# Patient Record
Sex: FEMALE | Race: WHITE | Employment: UNEMPLOYED | URBAN - METROPOLITAN AREA
[De-identification: names, ages, dates, MRNs, and addresses within clinical notes are randomized per-mention and may not be internally consistent; named-entity substitution may affect disease eponyms.]

---

## 2017-05-29 ENCOUNTER — HOSPITAL ENCOUNTER (EMERGENCY)
Age: 62
Discharge: HOME | End: 2017-05-29
Attending: EMERGENCY MEDICINE
Payer: MEDICARE

## 2017-05-29 VITALS
BODY MASS INDEX: 18.33 KG/M2 | TEMPERATURE: 98 F | RESPIRATION RATE: 20 BRPM | WEIGHT: 110 LBS | SYSTOLIC BLOOD PRESSURE: 127 MMHG | DIASTOLIC BLOOD PRESSURE: 74 MMHG | OXYGEN SATURATION: 100 % | HEIGHT: 65 IN | HEART RATE: 100 BPM

## 2017-05-29 DIAGNOSIS — F10.930 ALCOHOL WITHDRAWAL, UNCOMPLICATED (HCC): Primary | ICD-10-CM

## 2017-05-29 LAB
ALBUMIN SERPL BCP-MCNC: 3.6 G/DL (ref 3.2–4.6)
ALBUMIN/GLOB SERPL: 1.1 {RATIO} (ref 1.2–3.5)
ALP SERPL-CCNC: 107 U/L (ref 50–136)
ALT SERPL-CCNC: 16 U/L (ref 12–65)
AMPHET UR QL SCN: NEGATIVE
ANION GAP BLD CALC-SCNC: 15 MMOL/L (ref 7–16)
APAP SERPL-MCNC: <2 UG/ML (ref 10–30)
APTT PPP: 28.3 SEC (ref 23.5–31.7)
AST SERPL W P-5'-P-CCNC: 28 U/L (ref 15–37)
ATRIAL RATE: 108 BPM
BARBITURATES UR QL SCN: NEGATIVE
BASOPHILS # BLD AUTO: 0 K/UL (ref 0–0.2)
BASOPHILS # BLD: 0 % (ref 0–2)
BENZODIAZ UR QL: NEGATIVE
BILIRUB SERPL-MCNC: 0.8 MG/DL (ref 0.2–1.1)
BUN SERPL-MCNC: 7 MG/DL (ref 8–23)
CALCIUM SERPL-MCNC: 8.8 MG/DL (ref 8.3–10.4)
CALCULATED P AXIS, ECG09: 81 DEGREES
CALCULATED R AXIS, ECG10: 82 DEGREES
CALCULATED T AXIS, ECG11: 74 DEGREES
CANNABINOIDS UR QL SCN: NEGATIVE
CHLORIDE SERPL-SCNC: 98 MMOL/L (ref 98–107)
CO2 SERPL-SCNC: 23 MMOL/L (ref 21–32)
COCAINE UR QL SCN: NEGATIVE
CREAT SERPL-MCNC: 1.05 MG/DL (ref 0.6–1)
DIAGNOSIS, 93000: NORMAL
DIFFERENTIAL METHOD BLD: ABNORMAL
EOSINOPHIL # BLD: 0 K/UL (ref 0–0.8)
EOSINOPHIL NFR BLD: 0 % (ref 0.5–7.8)
ERYTHROCYTE [DISTWIDTH] IN BLOOD BY AUTOMATED COUNT: 12.4 % (ref 11.9–14.6)
ETHANOL SERPL-MCNC: 13 MG/DL
GLOBULIN SER CALC-MCNC: 3.4 G/DL (ref 2.3–3.5)
GLUCOSE SERPL-MCNC: 129 MG/DL (ref 65–100)
HCT VFR BLD AUTO: 42.7 % (ref 35.8–46.3)
HGB BLD-MCNC: 15.4 G/DL (ref 11.7–15.4)
IMM GRANULOCYTES # BLD: 0 K/UL (ref 0–0.5)
IMM GRANULOCYTES NFR BLD AUTO: 0.1 % (ref 0–5)
INR PPP: 1 (ref 0.9–1.2)
LYMPHOCYTES # BLD AUTO: 19 % (ref 13–44)
LYMPHOCYTES # BLD: 2.6 K/UL (ref 0.5–4.6)
MCH RBC QN AUTO: 31.4 PG (ref 26.1–32.9)
MCHC RBC AUTO-ENTMCNC: 36.1 G/DL (ref 31.4–35)
MCV RBC AUTO: 87 FL (ref 79.6–97.8)
METHADONE UR QL: NEGATIVE
MONOCYTES # BLD: 1.2 K/UL (ref 0.1–1.3)
MONOCYTES NFR BLD AUTO: 9 % (ref 4–12)
NEUTS SEG # BLD: 9.7 K/UL (ref 1.7–8.2)
NEUTS SEG NFR BLD AUTO: 72 % (ref 43–78)
OPIATES UR QL: NEGATIVE
P-R INTERVAL, ECG05: 138 MS
PCP UR QL: NEGATIVE
PLATELET # BLD AUTO: 404 K/UL (ref 150–450)
PMV BLD AUTO: 9.7 FL (ref 10.8–14.1)
POTASSIUM SERPL-SCNC: 3.4 MMOL/L (ref 3.5–5.1)
PROT SERPL-MCNC: 7 G/DL (ref 6.3–8.2)
PROTHROMBIN TIME: 11.2 SEC (ref 9.6–12)
Q-T INTERVAL, ECG07: 312 MS
QRS DURATION, ECG06: 86 MS
QTC CALCULATION (BEZET), ECG08: 418 MS
RBC # BLD AUTO: 4.91 M/UL (ref 4.05–5.25)
SALICYLATES SERPL-MCNC: 2.3 MG/DL (ref 2.8–20)
SODIUM SERPL-SCNC: 136 MMOL/L (ref 136–145)
VENTRICULAR RATE, ECG03: 108 BPM
WBC # BLD AUTO: 13.5 K/UL (ref 4.3–11.1)

## 2017-05-29 PROCEDURE — 80307 DRUG TEST PRSMV CHEM ANLYZR: CPT | Performed by: EMERGENCY MEDICINE

## 2017-05-29 PROCEDURE — 85730 THROMBOPLASTIN TIME PARTIAL: CPT | Performed by: EMERGENCY MEDICINE

## 2017-05-29 PROCEDURE — 85025 COMPLETE CBC W/AUTO DIFF WBC: CPT | Performed by: EMERGENCY MEDICINE

## 2017-05-29 PROCEDURE — 81003 URINALYSIS AUTO W/O SCOPE: CPT | Performed by: EMERGENCY MEDICINE

## 2017-05-29 PROCEDURE — 99285 EMERGENCY DEPT VISIT HI MDM: CPT | Performed by: EMERGENCY MEDICINE

## 2017-05-29 PROCEDURE — 74011250636 HC RX REV CODE- 250/636: Performed by: EMERGENCY MEDICINE

## 2017-05-29 PROCEDURE — 80053 COMPREHEN METABOLIC PANEL: CPT | Performed by: EMERGENCY MEDICINE

## 2017-05-29 PROCEDURE — 96374 THER/PROPH/DIAG INJ IV PUSH: CPT | Performed by: EMERGENCY MEDICINE

## 2017-05-29 PROCEDURE — 96375 TX/PRO/DX INJ NEW DRUG ADDON: CPT | Performed by: EMERGENCY MEDICINE

## 2017-05-29 PROCEDURE — 74011250637 HC RX REV CODE- 250/637: Performed by: EMERGENCY MEDICINE

## 2017-05-29 PROCEDURE — 85610 PROTHROMBIN TIME: CPT | Performed by: EMERGENCY MEDICINE

## 2017-05-29 PROCEDURE — 96361 HYDRATE IV INFUSION ADD-ON: CPT | Performed by: EMERGENCY MEDICINE

## 2017-05-29 PROCEDURE — 93005 ELECTROCARDIOGRAM TRACING: CPT | Performed by: EMERGENCY MEDICINE

## 2017-05-29 RX ORDER — THIAMINE HYDROCHLORIDE 100 MG/ML
100 INJECTION, SOLUTION INTRAMUSCULAR; INTRAVENOUS
Status: COMPLETED | OUTPATIENT
Start: 2017-05-29 | End: 2017-05-29

## 2017-05-29 RX ORDER — LORAZEPAM 1 MG/1
2 TABLET ORAL
Status: COMPLETED | OUTPATIENT
Start: 2017-05-29 | End: 2017-05-29

## 2017-05-29 RX ORDER — IBUPROFEN 400 MG/1
400 TABLET ORAL
Status: COMPLETED | OUTPATIENT
Start: 2017-05-29 | End: 2017-05-29

## 2017-05-29 RX ORDER — SODIUM CHLORIDE 9 MG/ML
1000 INJECTION, SOLUTION INTRAVENOUS ONCE
Status: COMPLETED | OUTPATIENT
Start: 2017-05-29 | End: 2017-05-29

## 2017-05-29 RX ORDER — ONDANSETRON 2 MG/ML
4 INJECTION INTRAMUSCULAR; INTRAVENOUS
Status: COMPLETED | OUTPATIENT
Start: 2017-05-29 | End: 2017-05-29

## 2017-05-29 RX ORDER — LORAZEPAM 1 MG/1
1 TABLET ORAL
Qty: 20 TAB | Refills: 0 | Status: ON HOLD | OUTPATIENT
Start: 2017-05-29 | End: 2019-07-11

## 2017-05-29 RX ORDER — LORAZEPAM 2 MG/ML
1 INJECTION INTRAMUSCULAR
Status: COMPLETED | OUTPATIENT
Start: 2017-05-29 | End: 2017-05-29

## 2017-05-29 RX ADMIN — SODIUM CHLORIDE 1000 ML: 900 INJECTION, SOLUTION INTRAVENOUS at 07:29

## 2017-05-29 RX ADMIN — IBUPROFEN 400 MG: 400 TABLET, FILM COATED ORAL at 12:56

## 2017-05-29 RX ADMIN — ONDANSETRON 4 MG: 2 INJECTION INTRAMUSCULAR; INTRAVENOUS at 07:33

## 2017-05-29 RX ADMIN — THIAMINE HYDROCHLORIDE 100 MG: 100 INJECTION, SOLUTION INTRAMUSCULAR; INTRAVENOUS at 07:33

## 2017-05-29 RX ADMIN — SODIUM CHLORIDE 1000 ML: 900 INJECTION, SOLUTION INTRAVENOUS at 10:17

## 2017-05-29 RX ADMIN — LORAZEPAM 1 MG: 2 INJECTION INTRAMUSCULAR; INTRAVENOUS at 07:33

## 2017-05-29 RX ADMIN — LORAZEPAM 2 MG: 1 TABLET ORAL at 11:26

## 2017-05-29 NOTE — ED NOTES
I have reviewed discharge instructions with the patient. The patient verbalized understanding. Discharge medications reviewed with patient and appropriate educational materials and side effects teaching were provided.  Patient ambulatory to the waiting room with steady gait, social work to arrange transport for patient home

## 2017-05-29 NOTE — ED NOTES
SW @ bedside, patient states she is unable to call the [facility] [facility] at this time, states \"I have a crisis at home with a pregnant cat and can't deal with this right now\". Patient updated on plan of care, no tremors present, AOV x 4, walking around room with steady gait.

## 2017-05-29 NOTE — PROGRESS NOTES
Spoke with patient about follow-up with [facility] detox. Although patient is occupied by reading text messages on her phone and states that she is occupied with worrying about her pregnant cat at home who is giving birth. SW explained to patient what she needs to do to connect with [facility] and that she do a phone screen about follow-up. Called patient a taxi for ride home.

## 2017-05-29 NOTE — ED NOTES
Pt states it has been over 48 hours since her last drink, states she typically drinks \"as much as I can get my hands on\", patient states this consists of a \"case or two of beer\". Patient AOV x 4.

## 2017-05-29 NOTE — ED TRIAGE NOTES
PT arrived to ED c/o withdrawing from ETOH PT states she drank for 6 days straight and has been sober for 2 days. PT is alert and oriented. PT c/o back pain.

## 2017-05-29 NOTE — ED PROVIDER NOTES
HPI Comments: Patient with alcoholism states she is withdrawing from alcohol. Last drink 3 days ago. Denies other substances. Very anxious and shaky. Nausea with no vomiting. Denies hallucinations. Has chronic back pain. Little cough. No urinary symptoms. Patient is a 65 y.o. female presenting with withdrawal. The history is provided by the patient, medical records and the EMS personnel. Withdrawal   Primary symptoms include: agitation. There areno confusion, no somnolence, no loss of consciousness, no seizures, no delusions, no hallucinations and no self-injury present at this time. This is a new problem. The current episode started 3 to 5 hours ago. The problem has not changed since onset. Suspected agents include alcohol. Associated symptoms include nausea. Pertinent negatives include no fever, no injury, no vomiting, no bladder incontinence and no bowel incontinence. Associated medical issues include withdrawal syndrome. Associated medical issues do not include suicidal ideas and homicidal ideas. Past Medical History:   Diagnosis Date    Alcohol abuse 10/31/2015    Alcohol withdrawal ([facility] [address] 10/31/2015    Asthma     Other ill-defined conditions     lumbar and thoracic scoliosis    Other ill-defined conditions     irregular heart rate    Psychiatric disorder     ADHD    Seizures ([facility] [address]        Past Surgical History:   Procedure Laterality Date    HX GYN      hysterectomy    HX HEENT      sinus surgery. deviated septum.  HX ORTHOPAEDIC      right knee         No family history on file. Social History     Social History    Marital status:      Spouse name: N/A    Number of children: N/A    Years of education: N/A     Occupational History    Not on file.      Social History Main Topics    Smoking status: Current Every Day Smoker     Packs/day: 0.25    Smokeless tobacco: Never Used    Alcohol use 0.0 oz/week     0 Standard drinks or equivalent per week    Drug use: No    Sexual activity: Not on file     Other Topics Concern    Not on file     Social History Narrative         ALLERGIES: Review of patient's allergies indicates no known allergies. Review of Systems   Constitutional: Positive for appetite change. Negative for fever. HENT: Negative. Respiratory: Positive for cough. Negative for shortness of breath. Cardiovascular: Negative. Gastrointestinal: Positive for nausea. Negative for abdominal pain, bowel incontinence, constipation, diarrhea and vomiting. Genitourinary: Negative. Negative for bladder incontinence. Musculoskeletal: Positive for back pain. Skin: Negative. Neurological: Negative. Negative for seizures and loss of consciousness. Hematological: Negative. Psychiatric/Behavioral: Positive for agitation. Negative for confusion, hallucinations, homicidal ideas, self-injury and suicidal ideas. The patient is nervous/anxious. Vitals:    05/29/17 1205 05/29/17 1215 05/29/17 1230 05/29/17 1245   BP: (!) 150/92 131/78 135/76 127/74   Pulse: (!) 110 (!) 106 (!) 105 100   Resp: 22 24 20 20   Temp:    98 °F (36.7 °C)   SpO2: 100% 98% 100% 100%   Weight:       Height:                Physical Exam   Constitutional: She is oriented to person, place, and time. Thin, anxious   HENT:   Head: Normocephalic and atraumatic. Right Ear: External ear normal.   Left Ear: External ear normal.   Mouth/Throat: Oropharynx is clear and moist.   Eyes: Conjunctivae and EOM are normal. Pupils are equal, round, and reactive to light. No scleral icterus. Neck: Normal range of motion. Neck supple. No JVD present. Cardiovascular: Regular rhythm, normal heart sounds and intact distal pulses. tachycardia   Pulmonary/Chest: Effort normal and breath sounds normal.   Abdominal: Soft. Bowel sounds are normal. She exhibits no mass. There is no tenderness. Musculoskeletal: Normal range of motion. She exhibits no edema or tenderness.    Neurological: She is alert and oriented to person, place, and time. She exhibits normal muscle tone. Skin: Skin is warm and dry. Psychiatric: Her speech is normal. Her mood appears anxious. She is agitated. She is not aggressive, not actively hallucinating and not combative. Thought content is not delusional. Cognition and memory are normal. She expresses impulsivity. She expresses no homicidal and no suicidal ideation. Nursing note and vitals reviewed. [facility]  ED Course       Procedures    Acute alcohol withdrawal  Labs reviewed  IVF's 2 liters NS. Thiamine 100 mg IV, Zofran 4 mg IV, Ativan 1 mg IV  Improving. Slept and ate. When awakened still anxious and tachycardic. Ativan 2 mg po. Complains of back pain. Ibuprofen 400 mg po. Advised to call [facility]. She wants to wait until she gets home. Has been to [facility] in the past. States she does not want to call them. Rx Ativan 1 mg # 20 one every 6 hours prn. Results and instructions discussed with patient. Follow up with [facility] and PCP.

## 2017-05-29 NOTE — ED NOTES
Patient requesting pain medication for chronic back pain, no tremors present, patient AOV x 4, denies other needs at this time.

## 2017-09-09 ENCOUNTER — APPOINTMENT (OUTPATIENT)
Dept: GENERAL RADIOLOGY | Age: 62
DRG: 184 | End: 2017-09-09
Attending: NURSE PRACTITIONER
Payer: MEDICARE

## 2017-09-09 ENCOUNTER — APPOINTMENT (OUTPATIENT)
Dept: CT IMAGING | Age: 62
DRG: 184 | End: 2017-09-09
Attending: EMERGENCY MEDICINE
Payer: MEDICARE

## 2017-09-09 ENCOUNTER — APPOINTMENT (OUTPATIENT)
Dept: CT IMAGING | Age: 62
DRG: 184 | End: 2017-09-09
Attending: NURSE PRACTITIONER
Payer: MEDICARE

## 2017-09-09 ENCOUNTER — HOSPITAL ENCOUNTER (INPATIENT)
Age: 62
LOS: 4 days | Discharge: HOME HEALTH | DRG: 184 | End: 2017-09-14
Attending: EMERGENCY MEDICINE | Admitting: INTERNAL MEDICINE
Payer: MEDICARE

## 2017-09-09 DIAGNOSIS — S27.0XXS TRAUMATIC PNEUMOTHORAX, SEQUELA: ICD-10-CM

## 2017-09-09 DIAGNOSIS — S22.41XA CLOSED FRACTURE OF MULTIPLE RIBS OF RIGHT SIDE, INITIAL ENCOUNTER: Primary | ICD-10-CM

## 2017-09-09 DIAGNOSIS — Z72.0 TOBACCO ABUSE: Chronic | ICD-10-CM

## 2017-09-09 DIAGNOSIS — S27.0XXA TRAUMATIC PNEUMOTHORAX, INITIAL ENCOUNTER: ICD-10-CM

## 2017-09-09 DIAGNOSIS — S22.49XD CLOSED FRACTURE OF MULTIPLE RIBS WITH ROUTINE HEALING, UNSPECIFIED LATERALITY, SUBSEQUENT ENCOUNTER: ICD-10-CM

## 2017-09-09 DIAGNOSIS — F10.11 H/O ETOH ABUSE: Chronic | ICD-10-CM

## 2017-09-09 DIAGNOSIS — R07.89 CHEST WALL PAIN: ICD-10-CM
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
## 2017-09-09 PROBLEM — S22.49XA FRACTURE OF RIBS, MULTIPLE, CLOSED: Status: ACTIVE | Noted: 2017-09-09

## 2017-09-09 PROBLEM — J93.9 PNEUMOTHORAX: Status: ACTIVE | Noted: 2017-09-09
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
## 2017-09-09 PROBLEM — S22.49XA RIB FRACTURES: Status: ACTIVE | Noted: 2017-09-09

## 2017-09-09 PROBLEM — S22.49XA FRACTURE OF MULTIPLE RIBS: Status: ACTIVE | Noted: 2017-09-09

## 2017-09-09 LAB
ALBUMIN SERPL-MCNC: 3.5 G/DL (ref 3.2–4.6)
ALBUMIN/GLOB SERPL: 0.8 {RATIO} (ref 1.2–3.5)
ALP SERPL-CCNC: 104 U/L (ref 50–136)
ALT SERPL-CCNC: 12 U/L (ref 12–65)
ANION GAP SERPL CALC-SCNC: 9 MMOL/L (ref 7–16)
AST SERPL-CCNC: 29 U/L (ref 15–37)
BASOPHILS # BLD: 0 K/UL (ref 0–0.2)
BASOPHILS NFR BLD: 0 % (ref 0–2)
BILIRUB SERPL-MCNC: 0.8 MG/DL (ref 0.2–1.1)
BUN SERPL-MCNC: 9 MG/DL (ref 8–23)
CALCIUM SERPL-MCNC: 8.9 MG/DL (ref 8.3–10.4)
CHLORIDE SERPL-SCNC: 97 MMOL/L (ref 98–107)
CO2 SERPL-SCNC: 28 MMOL/L (ref 21–32)
CREAT SERPL-MCNC: 0.57 MG/DL (ref 0.6–1)
DIFFERENTIAL METHOD BLD: ABNORMAL
EOSINOPHIL # BLD: 0.1 K/UL (ref 0–0.8)
EOSINOPHIL NFR BLD: 1 % (ref 0.5–7.8)
ERYTHROCYTE [DISTWIDTH] IN BLOOD BY AUTOMATED COUNT: 12.1 % (ref 11.9–14.6)
ETHANOL SERPL-MCNC: <3 MG/DL
GLOBULIN SER CALC-MCNC: 4.3 G/DL (ref 2.3–3.5)
GLUCOSE SERPL-MCNC: 107 MG/DL (ref 65–100)
HCT VFR BLD AUTO: 41.2 % (ref 35.8–46.3)
HGB BLD-MCNC: 14 G/DL (ref 11.7–15.4)
IMM GRANULOCYTES # BLD: 0 K/UL (ref 0–0.5)
IMM GRANULOCYTES NFR BLD: 0.2 % (ref 0–5)
LYMPHOCYTES # BLD: 2.1 K/UL (ref 0.5–4.6)
LYMPHOCYTES NFR BLD: 22 % (ref 13–44)
MCH RBC QN AUTO: 30.6 PG (ref 26.1–32.9)
MCHC RBC AUTO-ENTMCNC: 34 G/DL (ref 31.4–35)
MCV RBC AUTO: 90.2 FL (ref 79.6–97.8)
MONOCYTES # BLD: 1 K/UL (ref 0.1–1.3)
MONOCYTES NFR BLD: 10 % (ref 4–12)
NEUTS SEG # BLD: 6.5 K/UL (ref 1.7–8.2)
NEUTS SEG NFR BLD: 67 % (ref 43–78)
PLATELET # BLD AUTO: 327 K/UL (ref 150–450)
PMV BLD AUTO: 9.7 FL (ref 10.8–14.1)
POTASSIUM SERPL-SCNC: 4 MMOL/L (ref 3.5–5.1)
PROT SERPL-MCNC: 7.8 G/DL (ref 6.3–8.2)
RBC # BLD AUTO: 4.57 M/UL (ref 4.05–5.25)
SODIUM SERPL-SCNC: 134 MMOL/L (ref 136–145)
WBC # BLD AUTO: 9.8 K/UL (ref 4.3–11.1)

## 2017-09-09 PROCEDURE — 99218 HC RM OBSERVATION: CPT

## 2017-09-09 PROCEDURE — 74011250636 HC RX REV CODE- 250/636: Performed by: EMERGENCY MEDICINE

## 2017-09-09 PROCEDURE — 74011250637 HC RX REV CODE- 250/637: Performed by: NURSE PRACTITIONER

## 2017-09-09 PROCEDURE — 94760 N-INVAS EAR/PLS OXIMETRY 1: CPT

## 2017-09-09 PROCEDURE — 74011250637 HC RX REV CODE- 250/637: Performed by: EMERGENCY MEDICINE

## 2017-09-09 PROCEDURE — 94640 AIRWAY INHALATION TREATMENT: CPT

## 2017-09-09 PROCEDURE — 71250 CT THORAX DX C-: CPT

## 2017-09-09 PROCEDURE — 80307 DRUG TEST PRSMV CHEM ANLYZR: CPT | Performed by: NURSE PRACTITIONER

## 2017-09-09 PROCEDURE — 77030013140 HC MSK NEB VYRM -A

## 2017-09-09 PROCEDURE — 36415 COLL VENOUS BLD VENIPUNCTURE: CPT | Performed by: NURSE PRACTITIONER

## 2017-09-09 PROCEDURE — 74011250636 HC RX REV CODE- 250/636: Performed by: NURSE PRACTITIONER

## 2017-09-09 PROCEDURE — 80053 COMPREHEN METABOLIC PANEL: CPT | Performed by: NURSE PRACTITIONER

## 2017-09-09 PROCEDURE — 70450 CT HEAD/BRAIN W/O DYE: CPT

## 2017-09-09 PROCEDURE — 99284 EMERGENCY DEPT VISIT MOD MDM: CPT | Performed by: EMERGENCY MEDICINE

## 2017-09-09 PROCEDURE — 96374 THER/PROPH/DIAG INJ IV PUSH: CPT | Performed by: EMERGENCY MEDICINE

## 2017-09-09 PROCEDURE — 96376 TX/PRO/DX INJ SAME DRUG ADON: CPT | Performed by: EMERGENCY MEDICINE

## 2017-09-09 PROCEDURE — 85025 COMPLETE CBC W/AUTO DIFF WBC: CPT | Performed by: NURSE PRACTITIONER

## 2017-09-09 PROCEDURE — 99223 1ST HOSP IP/OBS HIGH 75: CPT | Performed by: INTERNAL MEDICINE

## 2017-09-09 PROCEDURE — 74011000250 HC RX REV CODE- 250: Performed by: NURSE PRACTITIONER

## 2017-09-09 RX ORDER — BISACODYL 5 MG
5 TABLET, DELAYED RELEASE (ENTERIC COATED) ORAL DAILY PRN
Status: DISCONTINUED | OUTPATIENT
Start: 2017-09-09 | End: 2017-09-13

## 2017-09-09 RX ORDER — HYDROCODONE BITARTRATE AND ACETAMINOPHEN 7.5; 325 MG/1; MG/1
1 TABLET ORAL
Status: DISCONTINUED | OUTPATIENT
Start: 2017-09-09 | End: 2017-09-12

## 2017-09-09 RX ORDER — MORPHINE SULFATE 10 MG/ML
5 INJECTION, SOLUTION INTRAMUSCULAR; INTRAVENOUS
Status: DISCONTINUED | OUTPATIENT
Start: 2017-09-09 | End: 2017-09-12

## 2017-09-09 RX ORDER — ONDANSETRON 2 MG/ML
4 INJECTION INTRAMUSCULAR; INTRAVENOUS
Status: DISCONTINUED | OUTPATIENT
Start: 2017-09-09 | End: 2017-09-14 | Stop reason: HOSPADM

## 2017-09-09 RX ORDER — PANTOPRAZOLE SODIUM 40 MG/1
40 TABLET, DELAYED RELEASE ORAL
Status: DISCONTINUED | OUTPATIENT
Start: 2017-09-10 | End: 2017-09-14 | Stop reason: HOSPADM

## 2017-09-09 RX ORDER — ALBUTEROL SULFATE 0.83 MG/ML
2.5 SOLUTION RESPIRATORY (INHALATION)
Status: DISCONTINUED | OUTPATIENT
Start: 2017-09-09 | End: 2017-09-14

## 2017-09-09 RX ORDER — SODIUM CHLORIDE 0.9 % (FLUSH) 0.9 %
5-10 SYRINGE (ML) INJECTION EVERY 8 HOURS
Status: DISCONTINUED | OUTPATIENT
Start: 2017-09-09 | End: 2017-09-14 | Stop reason: HOSPADM

## 2017-09-09 RX ORDER — SODIUM CHLORIDE 0.9 % (FLUSH) 0.9 %
5-10 SYRINGE (ML) INJECTION AS NEEDED
Status: DISCONTINUED | OUTPATIENT
Start: 2017-09-09 | End: 2017-09-14 | Stop reason: HOSPADM

## 2017-09-09 RX ORDER — LORAZEPAM 1 MG/1
1 TABLET ORAL
Status: DISCONTINUED | OUTPATIENT
Start: 2017-09-09 | End: 2017-09-14 | Stop reason: HOSPADM

## 2017-09-09 RX ORDER — CYCLOBENZAPRINE HCL 10 MG
10 TABLET ORAL
Status: COMPLETED | OUTPATIENT
Start: 2017-09-09 | End: 2017-09-09

## 2017-09-09 RX ORDER — MORPHINE SULFATE 4 MG/ML
4 INJECTION, SOLUTION INTRAMUSCULAR; INTRAVENOUS
Status: COMPLETED | OUTPATIENT
Start: 2017-09-09 | End: 2017-09-09

## 2017-09-09 RX ORDER — ENOXAPARIN SODIUM 100 MG/ML
40 INJECTION SUBCUTANEOUS EVERY 24 HOURS
Status: DISCONTINUED | OUTPATIENT
Start: 2017-09-09 | End: 2017-09-14 | Stop reason: HOSPADM

## 2017-09-09 RX ADMIN — MORPHINE SULFATE 5 MG: 10 INJECTION INTRAMUSCULAR; INTRAVENOUS; SUBCUTANEOUS at 18:40

## 2017-09-09 RX ADMIN — ALBUTEROL SULFATE 2.5 MG: 2.5 SOLUTION RESPIRATORY (INHALATION) at 16:25

## 2017-09-09 RX ADMIN — Medication 5 ML: at 16:50

## 2017-09-09 RX ADMIN — MORPHINE SULFATE 5 MG: 10 INJECTION INTRAMUSCULAR; INTRAVENOUS; SUBCUTANEOUS at 15:15

## 2017-09-09 RX ADMIN — ALBUTEROL SULFATE 2.5 MG: 2.5 SOLUTION RESPIRATORY (INHALATION) at 19:54

## 2017-09-09 RX ADMIN — MORPHINE SULFATE 5 MG: 10 INJECTION INTRAMUSCULAR; INTRAVENOUS; SUBCUTANEOUS at 22:04

## 2017-09-09 RX ADMIN — FOLIC ACID: 5 INJECTION, SOLUTION INTRAMUSCULAR; INTRAVENOUS; SUBCUTANEOUS at 17:20

## 2017-09-09 RX ADMIN — CYCLOBENZAPRINE HYDROCHLORIDE 10 MG: 10 TABLET, FILM COATED ORAL at 12:43

## 2017-09-09 RX ADMIN — HYDROCODONE BITARTRATE AND ACETAMINOPHEN 1 TABLET: 7.5; 325 TABLET ORAL at 15:15

## 2017-09-09 RX ADMIN — Medication 5 ML: at 22:00

## 2017-09-09 RX ADMIN — MORPHINE SULFATE 4 MG: 4 INJECTION, SOLUTION INTRAMUSCULAR; INTRAVENOUS at 12:43

## 2017-09-09 RX ADMIN — ENOXAPARIN SODIUM 40 MG: 40 INJECTION SUBCUTANEOUS at 16:50

## 2017-09-09 NOTE — H&P
HISTORY AND PHYSICAL      Francy Rodriguez    9/9/2017    Date of Admission:  9/9/2017    The patient's chart is reviewed and the patient is discussed with the staff. Subjective:     Patient is a 65 y.o.  female presents s/p fall with L flank pain. Patient has a history of ETOH abuse (reports her last drink was 6 years ago) and tobacco but is quitting and is down to 2 cigarettes / day. She was admitted 11/2015 at [address] [place] [address] for ETOH abuse and [facility] anxiety with suicide attempts x 4 with xanax overdose and admission to [address] [place] [address] x 4. She was most recently seen at [address] [place] [address] 6/2017 and was admitted to [address] [place] [address] for ETOH abuse. Patient presents to the ER after falling in the bathroom. She recalls hitting her head but denies LOC. She states that she landed on her R side and has multiple abrasions. She presented to the ER for further evaluation. CT head without acute abnormality. CT chest with multiple rib fractures (9-11), small R pleural fluid collection, and small anterior R PTX. She received medication for pain but continues to c/o significant discomfort. She will be admitted for pain control. Review of Systems  Constitutional: negative  Eyes: negative  Ears, nose, mouth, throat, and face: negative  Respiratory: positive for pain with inspiration  Cardiovascular: negative  Gastrointestinal: negative  Genitourinary:negative  Integument/breast: negative  Hematologic/lymphatic: negative  Musculoskeletal:positive for rib pain  Neurological: negative  Behavioral/Psych: positive for depression, excessive alcohol consumption and tobacco use    Patient Active Problem List   Diagnosis Code    Psychiatric disorder F99    Other ill-defined conditions R69    Alcohol abuse F10.10    Tobacco abuse Z72.0       Prior to Admission Medications   Prescriptions Last Dose Informant Patient Reported? Taking?    LORazepam (ATIVAN) 1 mg tablet   No Yes   Sig: Take 1 Tab by mouth every six (6) hours as needed for Anxiety. Max Daily Amount: 4 mg.   gabapentin (NEURONTIN) 400 mg capsule   Yes Yes   Sig: Take 400 mg by mouth three (3) times daily. meloxicam (MOBIC) 15 mg tablet Not Taking at Unknown time  No No   Sig: Take 1 Tab by mouth daily. thiamine (B-1) 100 mg tablet Not Taking at Unknown time  No No   Sig: Take 1 Tab by mouth daily. Facility-Administered Medications: None       Past Medical History:   Diagnosis Date    Alcohol abuse 10/31/2015    Alcohol withdrawal ([facility] [address] 10/31/2015    Asthma     Other ill-defined conditions     lumbar and thoracic scoliosis    Other ill-defined conditions     irregular heart rate    Psychiatric disorder     ADHD    Seizures ([facility] [address]      Past Surgical History:   Procedure Laterality Date    HX GYN      hysterectomy    HX HEENT      sinus surgery. deviated septum.  HX ORTHOPAEDIC      right knee     Social History     Social History    Marital status:      Spouse name: N/A    Number of children: N/A    Years of education: N/A     Occupational History    Not on file. Social History Main Topics    Smoking status: Current Every Day Smoker     Packs/day: 0.25    Smokeless tobacco: Never Used    Alcohol use 0.0 oz/week     0 Standard drinks or equivalent per week    Drug use: No    Sexual activity: Not on file     Other Topics Concern    Not on file     Social History Narrative     History reviewed. No pertinent family history. No Known Allergies    No current facility-administered medications for this encounter. Current Outpatient Prescriptions   Medication Sig    LORazepam (ATIVAN) 1 mg tablet Take 1 Tab by mouth every six (6) hours as needed for Anxiety. Max Daily Amount: 4 mg.  gabapentin (NEURONTIN) 400 mg capsule Take 400 mg by mouth three (3) times daily.  meloxicam (MOBIC) 15 mg tablet Take 1 Tab by mouth daily.  thiamine (B-1) 100 mg tablet Take 1 Tab by mouth daily.            Objective: Vitals:    09/09/17 1157   BP: 131/84   Pulse: (!) 101   Resp: 26   Temp: 100 °F (37.8 °C)   SpO2: 96%   Weight: 110 lb (49.9 kg)   Height: 5' 5\" (1.651 m)       PHYSICAL EXAM     Constitutional:  the patient is well developed and in no acute distress  EENMT:  Sclera clear, pupils equal, oral mucosa moist  Respiratory: crackles R posterior base, RA  Cardiovascular:  RRR without M,G,R  Gastrointestinal: soft and non-tender; with positive bowel sounds. Musculoskeletal: warm without cyanosis. There is no lower leg edema. Skin:  no jaundice or rashes, multiple abrasions to face / RLE Neurologic: no gross neuro deficits     Psychiatric:  alert and oriented x 3    CXR:  9/8 CT head  No acute findings    9/8 CT chest  Fractures of the right ninth through 11th ribs with small right pleural fluid collection and small anterior right-sided pneumothorax            Recent Labs      09/09/17   1207   WBC  9.8   HGB  14.0   HCT  41.2   PLT  327     Recent Labs      09/09/17   1207   NA  134*   K  4.0   CL  97*   GLU  107*   CO2  28   BUN  9   CREA  0.57*   CA  8.9   ALB  3.5   TBILI  0.8   ALT  12   SGOT  29     No results for input(s): PH, PCO2, PO2, HCO3 in the last 72 hours. No results for input(s): LCAD, LAC in the last 72 hours. Assessment:  (Medical Decision Making)     Hospital Problems  Date Reviewed: 9/9/2017          Codes Class Noted POA    Tobacco abuse (Chronic) ICD-10-CM: Z72.0  ICD-9-CM: 305.1  9/9/2017 Yes    Patient states she is trying to quit and has cut back to 2 cigarettes / day      * (Principal)Fracture of multiple ribs ICD-10-CM: S22.49XA  ICD-9-CM: 807.09  9/9/2017 Yes     Ribs 9-11  With ongoing significant R sided pain         Fall ICD-10-CM: W19. Travon Marika  ICD-9-CM: E888.9  9/9/2017 Yes    Reports she fell in the bathtub hitting her right side.   She states that she pulled down a towel rack on her way down sustaining multiple abrasions to face and LE      Pneumothorax ICD-10-CM: J93.9  ICD-9-CM: 512.89  9/9/2017 Yes    R sided - small / anterior  Patient does not require Chest Tube at this time      H/O ETOH abuse (Chronic) ICD-10-CM: D96.492  ICD-9-CM: 305.03  9/9/2017 Yes    Patient states that she has not had a drink in 6 years and yet she was admitted to [facility] 11/2015 for ETOH withdrawal and at [facility] 6/2017 for ETOH withdrawl and was admitted to [facility] from there    Patient also has several admissions to [facility] per [facility] for multiple suicide attempts. Plan:  (Medical Decision Making)     --Will admit for further medical management  --Supplemental O2   --Respiratory nebulizer treatments  --pain control (will not order anti-inflammatories at this time with hx of ETOH abuse)  --ETOH level pending  --O2 for N2 washout  --consult PT for mobility  --IVF with thiamine/vit K /folic acid  --DVT/GI prophylaxis    More than 50% of the time documented was spent in face-to-face contact with the patient and in the care of the patient on the floor/unit where the patient is located. Todd Moser NP     The patient has been seen and examined by me personally, the chart,labs, and radiographic studies have been reviewed. Chest: CTA  Extremities: no edema    I agree with the above assessment and plan.   The PTX is miniscule and only seen on CT chest  Needs analgesia- admit for observation     Camilo Garcia MD.

## 2017-09-09 NOTE — Clinical Note
Status[de-identified] Inpatient [101] Type of Bed: Medical [8] Inpatient Hospitalization Certified Necessary for the Following Reasons: 3. Patient receiving treatment that can only be provided in an inpatient setting (further clarification in H&P documentation) Admitting Diagnosis: Rib fractures [756803] Admitting Physician: LOPEZ Dexter Attending Physician: LOPEZ Dexter Estimated Length of Stay: 5-7 Midnights Discharge Plan[de-identified] Home with Office Follow-up Comments: 8th floor please

## 2017-09-09 NOTE — PROGRESS NOTES
TRANSFER - IN REPORT:    Verbal report received from [place] (name) on [employer]  being received from ER (unit) for routine progression of care      Report consisted of patients Situation, Background, Assessment and   Recommendations(SBAR). Information from the following report(s) SBAR and Kardex was reviewed with the receiving nurse. Opportunity for questions and clarification was provided. Assessment completed upon patients arrival to unit and care assumed. SBAR given to primary receiving RN, Maria E Ko.

## 2017-09-09 NOTE — PROGRESS NOTES
Visit with patient in ER prior to transfer to floor. Patient is calm. Assured patient of our support during their time with us.     Guy Saavedra,  Staff   C: [phone]  /  [email or web address]

## 2017-09-09 NOTE — ED PROVIDER NOTES
HPI Comments: 71-year-old lady with a history of eye lateral rib pain that she says is  Greater on the right than the left after she fell in her bathtub last night. Patient says that she now has pain if she tries to move or take a deep breath. She says she did not have any loss of consciousness and is not having any neck pain. He denies any hip pain. She says she has no weakness, numbness, or tingling. Overall she says her pain is a 10 out of 10. Elements of this note were created using speech recognition software. As such, errors of speech recognition may be present. Patient is a 65 y.o. female presenting with fall. The history is provided by the patient. Fall   Pertinent negatives include no fever. Past Medical History:   Diagnosis Date    Alcohol abuse 10/31/2015    Alcohol withdrawal ([facility] [address] 10/31/2015    Asthma     Other ill-defined conditions     lumbar and thoracic scoliosis    Other ill-defined conditions     irregular heart rate    Psychiatric disorder     ADHD    Seizures ([facility] [address]        Past Surgical History:   Procedure Laterality Date    HX GYN      hysterectomy    HX HEENT      sinus surgery. deviated septum.  HX ORTHOPAEDIC      right knee         History reviewed. No pertinent family history. Social History     Social History    Marital status:      Spouse name: N/A    Number of children: N/A    Years of education: N/A     Occupational History    Not on file. Social History Main Topics    Smoking status: Current Every Day Smoker     Packs/day: 0.25    Smokeless tobacco: Never Used    Alcohol use 0.0 oz/week     0 Standard drinks or equivalent per week    Drug use: No    Sexual activity: Not on file     Other Topics Concern    Not on file     Social History Narrative         ALLERGIES: Review of patient's allergies indicates no known allergies. Review of Systems   Constitutional: Negative for chills and fever.    Respiratory: Negative for cough, shortness of breath and wheezing. Cardiovascular: Negative for chest pain and palpitations. Musculoskeletal: Positive for arthralgias. Negative for joint swelling. Skin: Negative. Vitals:    09/09/17 1157   BP: 131/84   Pulse: (!) 101   Resp: 26   Temp: 100 °F (37.8 °C)   SpO2: 96%   Weight: 49.9 kg (110 lb)   Height: 5' 5\" (1.651 m)            Physical Exam   Constitutional: She is oriented to person, place, and time. She appears well-developed and well-nourished. HENT:   Head: Normocephalic and atraumatic. Neck: Normal range of motion. Cardiovascular: Normal rate and regular rhythm. Pulmonary/Chest: Effort normal.   Musculoskeletal:   Tender to palpation along both sides lower ribs   Neurological: She is alert and oriented to person, place, and time. Skin: Skin is warm and dry. Nursing note and vitals reviewed. MDM  Number of Diagnoses or Management Options  Diagnosis management comments: I will get a CT of her chest to evaluate for rib or lung injuries.     ED Course       Procedures

## 2017-09-09 NOTE — ED TRIAGE NOTES
Abram Dao is a 65 y.o. female here for right sided rib pain after a fall last night while getting out of the bath tub. She states she lost her balance and fell. She states she hit her head. She denies LOC but states she did \"see stars\". She has noted tenderness to the right mid back. She has wheezing noted on her ausculation. She is alert and oriented. She is answering questions appropriate. Her respirations are even and non labored. Patient in no acute distress. Rapid assessment performed. --- Orders were placed.      Signed By: VERÓNICA James     September 9, 2017

## 2017-09-09 NOTE — IP AVS SNAPSHOT
[address] 
[phone] Patient: Petr Singleton MRN: [id number] MIQ:[birth year] You are allergic to the following No active allergies Immunizations Administered for This Admission Name Date  
 TB Skin Test (PPD) Intradermal 9/10/2017 Recent Documentation Height Weight Breastfeeding? BMI OB Status Smoking Status 1.651 m 55.1 kg No 20.22 kg/m2 Hysterectomy Current Every Day Smoker Emergency Contacts Name Discharge Info Relation Home Work Mobile Rosmery Dasilva  Friend [5] [phone] Larance Pour   [phone] About your hospitalization You were admitted on:  September 9, 2017 You last received care in the:  [facility] 8 MED SURG You were discharged on:  September 14, 2017 Unit phone number:  [phone] Why you were hospitalized Your primary diagnosis was:  Fracture Of Multiple Ribs Your diagnoses also included:  Fall, Pneumothorax, Tobacco Abuse, H/O Etoh Abuse, Rib Fractures, Fracture Of Ribs, Multiple, Closed Providers Seen During Your Hospitalizations Provider Role Specialty Primary office phone Denise Babb MD Attending Provider Emergency Medicine [phone] Bina Salcedo MD Attending Provider Pulmonary Disease [phone] Your Primary Care Physician (PCP) Primary Care Physician Office Phone Office Fax NONE ** None ** ** None ** Follow-up Information Follow up With Details Comments [facility] Info [facility] ([place]   [address] [address] 
[phone] [facility] On 9/18/2017 9:10am with nurse practioner [address] [facility] 
[phone] None   None ([phone]) Patient stated that they have no PCP Your Appointments  Monday September 18, 2017  9:10 AM EDT  
 New Patient with TANY[facility] ([address] [facility]) [place] [address]  
[phone] Current Discharge Medication List  
  
START taking these medications Dose & Instructions Dispensing Information Comments Morning Noon Evening Bedtime  
 fentaNYL 50 mcg/hr PATCH Commonly known as:  Lenoard Reddish Your last dose was:  Yesterday 09/13/17 at 60 [phone] 25 Your next dose is:  09/16/17 Dose:  1 Patch 1 Patch by TransDERmal route every seventy-two (72) hours for 14 days. Max Daily Amount: 1 Patch. Quantity:  5 Patch Refills:  0  
     
   
   
   
  
 ibuprofen 600 mg tablet Commonly known as:  MOTRIN Dose:  600 mg Take 1 Tab by mouth three (3) times daily. Quantity:  90 Tab Refills:  1  
     
   
   
   
  
 oxyCODONE IR 10 mg Tab immediate release tablet Commonly known as:  Silas Laser Your next dose is: Take on as needed schedule Dose:  10 mg Take 1 Tab by mouth every four (4) hours as needed. Max Daily Amount: 60 mg.  
 Quantity:  30 Tab Refills:  0  
     
   
   
   
  
 pantoprazole 40 mg tablet Commonly known as:  PROTONIX Your next dose is:  Tomorrow Morning Dose:  40 mg Take 1 Tab by mouth Daily (before breakfast). Indications: while taking high dose ibuprofen for rib fractures Quantity:  30 Tab Refills:  1  
     
   
   
   
  
 senna-docusate 8.6-50 mg per tablet Commonly known as:  Mojgan Dine Your next dose is: This evening Dose:  1 Tab Take 1 Tab by mouth two (2) times a day. Quantity:  60 Tab Refills:  - CONTINUE these medications which have NOT CHANGED Dose & Instructions Dispensing Information Comments Morning Noon Evening Bedtime LORazepam 1 mg tablet Commonly known as:  ATIVAN Dose:  1 mg Take 1 Tab by mouth every six (6) hours as needed for Anxiety. Max Daily Amount: 4 mg. Quantity:  20 Tab Refills:  0 NEURONTIN 400 mg capsule Generic drug:  gabapentin Your next dose is:  Resume home schedule Dose:  400 mg Take 400 mg by mouth three (3) times daily. Refills:  0 Where to Get Your Medications Information on where to get these meds will be given to you by the nurse or doctor. ! Ask your nurse or doctor about these medications  
  fentaNYL 50 mcg/hr PATCH  
 ibuprofen 600 mg tablet  
 oxyCODONE IR 10 mg Tab immediate release tablet  
 pantoprazole 40 mg tablet  
 senna-docusate 8.6-50 mg per tablet Discharge Instructions Collapsed Lung: Care Instructions Your Care Instructions A collapsed lung (pneumothorax) is a buildup of air in the space between the lung and the chest wall. As more air builds up in this space, the pressure against the lung makes the lung collapse. This causes shortness of breath and chest pain because your lung cannot fully expand. A collapsed lung is usually caused by an injury to the chest, but it may also occur suddenly without an injury because of a lung illness, such as emphysema or lung fibrosis. Your lung may collapse after lung surgery or another medical procedure. Sometimes it happens for no known reason in an otherwise healthy person (spontaneous pneumothorax). Treatment depends on the cause of the collapse. It may heal with rest, although your doctor will want to keep track of your progress. It can take several days for the lung to expand again. Your doctor may have drained the air with a needle or tube inserted into the space between your chest and the collapsed lung. If you have a chest tube, be sure to follow your doctor's instructions about how to care for the tube. You may need further treatment if you are not getting better.  Surgery is sometimes needed to keep the lung inflated. The doctor will want to keep track of your progress, so you will need a follow-up exam within a few days. The doctor has checked you carefully, but problems can develop later. If you notice any problems or new symptoms, get medical treatment right away. Follow-up care is a key part of your treatment and safety. Be sure to make and go to all appointments, and call your doctor if you are having problems. It's also a good idea to know your test results and keep a list of the medicines you take. How can you care for yourself at home? · Get plenty of rest and sleep. You may feel weak and tired for a while, but your energy level will improve with time. · Hold a pillow against your chest when you cough or take deep breaths. This will support your chest and decrease your pain. · Take pain medicines exactly as directed. ¨ If the doctor gave you a prescription medicine for pain, take it as prescribed. ¨ If you are not taking a prescription pain medicine, ask your doctor if you can take an over-the-counter medicine. · If your doctor prescribed antibiotics, take them as directed. Do not stop taking them just because you feel better. You need to take the full course of antibiotics. · If you have a bandage over your chest tube, or the place where the chest tube was inserted, keep it clean and dry. Follow your doctor's instructions on bandage care. · If you go home with a tube in place, follow the doctor's directions. Do not adjust the tube in any way. This could break the seal or cause other problems. Keep the tube dry. · Avoid any movements that require your muscles, especially your chest muscles, to strain. Such movements include laughing hard, bearing down to have a bowel movement, and heavy lifting. Try not to cough. · Do not fly in an airplane until your doctor tells you it is okay. Avoid any situations where there is increased air pressure. · Do not smoke or allow others to smoke around you. If you need help quitting, talk to your doctor about stop-smoking programs and medicines. These can increase your chances of quitting for good. When should you call for help? Call 911 anytime you think you may need emergency care. For example, call if: 
· You have severe trouble breathing. · You passed out (lost consciousness). Call your doctor now or seek immediate medical care if: 
· You have new or worse trouble breathing. · You have new pain or your pain gets worse. · You have a fever. · You cough up blood. · Your chest tube starts to come out or falls out. · You are bleeding through the bandage where the tube was put in. Watch closely for changes in your health, and be sure to contact your doctor if: · The skin around the place where the chest tube was put in is red or irritated. · You do not get better as expected. Where can you learn more? Go to http://sanchez-mila.info/. Enter Y826 in the search box to learn more about \"Collapsed Lung: Care Instructions. \" Current as of: March 25, 2017 Content Version: 11.3 © 4817-8582 [employer]. Care instructions adapted under license by [employer] (which disclaims liability or warranty for this information). If you have questions about a medical condition or this instruction, always ask your healthcare professional. [place] [address] any warranty or liability for your use of this information. Broken Rib: Care Instructions Your Care Instructions A broken rib is a crack or break in one of the bones of the rib cage. Breathing can be very painful because the muscles used for breathing pull on the rib. In most cases, a broken rib will heal on its own. You can take pain medicine while the rib mends. Pain relief allows you to take deep breaths. In the past, doctors recommended taping or wrapping broken ribs.  This is no longer done because taping makes it hard for you to take deep breaths. You need to take deep breaths at least once an hour to prevent pneumonia or a partial collapse of a lung. Your rib will heal in about 6 weeks. You heal best when you take good care of yourself. Eat a variety of healthy foods, and don't smoke. Follow-up care is a key part of your treatment and safety. Be sure to make and go to all appointments, and call your doctor if you are having problems. It's also a good idea to know your test results and keep a list of the medicines you take. How can you care for yourself at home? · Be safe with medicines. Read and follow all instructions on the label. ¨ If the doctor gave you a prescription medicine for pain, take it as prescribed. ¨ If you are not taking a prescription pain medicine, ask your doctor if you can take an over-the-counter medicine. · Even if it hurts, cough or take the deepest breath you can at least once every hour. This will get air deeply into your lungs and reduce your chance of getting pneumonia or a partial collapse of a lung. Hold a pillow against your chest to make this less painful. · Put ice or a cold pack on the area for 10 to 20 minutes at a time. Put a thin cloth between the ice and your skin. When should you call for help? Call 911 anytime you think you may need emergency care. For example, call if: 
· You have severe trouble breathing. Call your doctor now or seek immediate medical care if: 
· You have some trouble breathing. · You have a fever. · You have a new or worse cough. Watch closely for changes in your health, and be sure to contact your doctor if: 
· You have pain even after taking your medicine. · You do not get better as expected. Where can you learn more? Go to http://sanchez-mila.info/. Enter M135 in the search box to learn more about \"Broken Rib: Care Instructions. \" Current as of: March 21, 2017 Content Version: 11.3 © 0198-4792 Healthwise, PrePlay. Care instructions adapted under license by [employer] (which disclaims liability or warranty for this information). If you have questions about a medical condition or this instruction, always ask your healthcare professional. [address] any warranty or liability for your use of this information. DISCHARGE SUMMARY from Nurse The following personal items are in your possession at time of discharge: 
 
Dental Appliances: Partials, With patient Visual Aid: None Home Medications: None Jewelry: None Clothing: At bedside, Pants, Sweater, With patient, Catana Tez Other Valuables: Mara Tsang [employer] Personal Items Sent to Safe: none PATIENT INSTRUCTIONS: 
 
 
F-face looks uneven A-arms unable to move or move unevenly S-speech slurred or non-existent T-time-call 911 as soon as signs and symptoms begin-DO NOT go Back to bed or wait to see if you get better-TIME IS BRAIN. Warning Signs of HEART ATTACK Call 911 if you have these symptoms: 
? Chest discomfort. Most heart attacks involve discomfort in the center of the chest that lasts more than a few minutes, or that goes away and comes back. It can feel like uncomfortable pressure, squeezing, fullness, or pain. ? Discomfort in other areas of the upper body. Symptoms can include pain or discomfort in one or both arms, the back, neck, jaw, or stomach. ? Shortness of breath with or without chest discomfort. ? Other signs may include breaking out in a cold sweat, nausea, or lightheadedness. Don't wait more than five minutes to call [address]! Fast action can save your life. Calling 911 is almost always the fastest way to get lifesaving treatment.  Emergency Medical Services staff can begin treatment when they arrive  up to an hour sooner than if someone gets to the hospital by car. The discharge information has been reviewed with the patient. The patient verbalized understanding. Discharge medications reviewed with the patient and appropriate educational materials and side effects teaching were provided. Discharge Orders None [employer] Announcement We are excited to announce that we are making your provider's discharge notes available to you in PluggedIn. You will see these notes when they are completed and signed by the physician that discharged you from your recent hospital stay. If you have any questions or concerns about any information you see in PluggedIn, please call the Health Information Department where you were seen or reach out to your Primary Care Provider for more information about your plan of care. Introducing [facility] SERVICES! [facility] introduces PluggedIn patient portal. Now you can access parts of your medical record, email your doctor's office, and request medication refills online. 1. In your internet browser, go to [email or web address] 2. Click on the First Time User? Click Here link in the Sign In box. You will see the New Member Sign Up page. 3. Enter your PluggedIn Access Code exactly as it appears below. You will not need to use this code after youve completed the sign-up process. If you do not sign up before the expiration date, you must request a new code. · PluggedIn Access Code: [email or web address] Expires: 12/13/2017 11:45 AM 
 
4. Enter the last four digits of your Social Security Number (xxxx) and Date of Birth (mm/dd/yyyy) as indicated and click Submit. You will be taken to the next sign-up page. 5. Create a Hex[employer] ID. This will be your PluggedIn login ID and cannot be changed, so think of one that is secure and easy to remember. 6. Create a [employer] password. You can change your password at any time. 7. Enter your Password Reset Question and Answer. This can be used at a later time if you forget your password. 8. Enter your e-mail address. You will receive e-mail notification when new information is available in [address] 9. Click Sign Up. You can now view and download portions of your medical record. 10. Click the Download Summary menu link to download a portable copy of your medical information. If you have questions, please visit the Frequently Asked Questions section of the [employer] website. Remember, [employer] is NOT to be used for urgent needs. For medical emergencies, dial 911. Now available from your iPhone and Android! General Information Please provide this summary of care documentation to your next provider. Patient Signature:  ____________________________________________________________ Date:  ____________________________________________________________  
  
Angel Toure Provider Signature:  ____________________________________________________________ Date:  ____________________________________________________________

## 2017-09-09 NOTE — ED NOTES
TRANSFER - OUT REPORT:    Verbal report given to Daphney Gamez on Graciela Riddle  being transferred to 23-14-20-09 for routine progression of care       Report consisted of patients Situation, Background, Assessment and   Recommendations(SBAR). Information from the following report(s) SBAR, ED Summary, Procedure Summary, Intake/Output, MAR and Recent Results was reviewed with the receiving nurse. Lines:   Peripheral IV 09/09/17 Left Forearm (Active)   Site Assessment Clean, dry, & intact 9/9/2017  3:55 PM   Phlebitis Assessment 0 9/9/2017  3:55 PM   Infiltration Assessment 0 9/9/2017  3:55 PM   Dressing Status Clean, dry, & intact 9/9/2017  3:55 PM        Opportunity for questions and clarification was provided.       Patient transported with:   O2 @ 10 liters  Tech

## 2017-09-09 NOTE — ED TRIAGE NOTES
Pt arrived via ems after a fall last night. Pt is hurting on her lower back. Pt was getting out of the bathtub.

## 2017-09-09 NOTE — PROGRESS NOTES
Admission assessment complete. Pt resting in bed, watching TV. Complaint of pain in rib cage and will medicate as allowed. 4L NC in place. S1S2 heard. VSS. Dual skin assessment performed with Paola Wheatley RN. Pt has multiple abrasions, scrapes, cuts to right and left arms, right and left legs, back, and face. No breakdown to bilateral heel, elbows, or sacrum. Pt oriented to room and call light. Instructed patient to call for assistance. Pt verbalized understanding. Safety measures in place. Call light in reach.

## 2017-09-10 ENCOUNTER — APPOINTMENT (OUTPATIENT)
Dept: GENERAL RADIOLOGY | Age: 62
DRG: 184 | End: 2017-09-10
Attending: INTERNAL MEDICINE
Payer: MEDICARE

## 2017-09-10 PROCEDURE — 97162 PT EVAL MOD COMPLEX 30 MIN: CPT

## 2017-09-10 PROCEDURE — 94760 N-INVAS EAR/PLS OXIMETRY 1: CPT

## 2017-09-10 PROCEDURE — 71010 XR CHEST SNGL V: CPT

## 2017-09-10 PROCEDURE — 74011000250 HC RX REV CODE- 250: Performed by: NURSE PRACTITIONER

## 2017-09-10 PROCEDURE — 99218 HC RM OBSERVATION: CPT

## 2017-09-10 PROCEDURE — 74011250636 HC RX REV CODE- 250/636: Performed by: NURSE PRACTITIONER

## 2017-09-10 PROCEDURE — 74011250637 HC RX REV CODE- 250/637: Performed by: NURSE PRACTITIONER

## 2017-09-10 PROCEDURE — 65270000029 HC RM PRIVATE

## 2017-09-10 PROCEDURE — 99232 SBSQ HOSP IP/OBS MODERATE 35: CPT | Performed by: INTERNAL MEDICINE

## 2017-09-10 PROCEDURE — 94640 AIRWAY INHALATION TREATMENT: CPT

## 2017-09-10 PROCEDURE — 86580 TB INTRADERMAL TEST: CPT | Performed by: INTERNAL MEDICINE

## 2017-09-10 PROCEDURE — 74011000302 HC RX REV CODE- 302: Performed by: INTERNAL MEDICINE

## 2017-09-10 PROCEDURE — 74011250637 HC RX REV CODE- 250/637: Performed by: INTERNAL MEDICINE

## 2017-09-10 RX ORDER — HYDROCODONE BITARTRATE AND ACETAMINOPHEN 10; 325 MG/1; MG/1
1 TABLET ORAL EVERY 6 HOURS
Status: DISCONTINUED | OUTPATIENT
Start: 2017-09-10 | End: 2017-09-13

## 2017-09-10 RX ADMIN — FOLIC ACID: 5 INJECTION, SOLUTION INTRAMUSCULAR; INTRAVENOUS; SUBCUTANEOUS at 17:00

## 2017-09-10 RX ADMIN — Medication 5 ML: at 05:14

## 2017-09-10 RX ADMIN — HYDROCODONE BITARTRATE AND ACETAMINOPHEN 1 TABLET: 7.5; 325 TABLET ORAL at 11:09

## 2017-09-10 RX ADMIN — Medication 5 ML: at 22:21

## 2017-09-10 RX ADMIN — ALBUTEROL SULFATE 2.5 MG: 2.5 SOLUTION RESPIRATORY (INHALATION) at 16:14

## 2017-09-10 RX ADMIN — MORPHINE SULFATE 5 MG: 10 INJECTION INTRAMUSCULAR; INTRAVENOUS; SUBCUTANEOUS at 08:42

## 2017-09-10 RX ADMIN — ENOXAPARIN SODIUM 40 MG: 40 INJECTION SUBCUTANEOUS at 16:55

## 2017-09-10 RX ADMIN — ALBUTEROL SULFATE 2.5 MG: 2.5 SOLUTION RESPIRATORY (INHALATION) at 19:31

## 2017-09-10 RX ADMIN — Medication 5 ML: at 13:16

## 2017-09-10 RX ADMIN — HYDROCODONE BITARTRATE AND ACETAMINOPHEN 1 TABLET: 10; 325 TABLET ORAL at 16:56

## 2017-09-10 RX ADMIN — ALBUTEROL SULFATE 2.5 MG: 2.5 SOLUTION RESPIRATORY (INHALATION) at 11:25

## 2017-09-10 RX ADMIN — MORPHINE SULFATE 5 MG: 10 INJECTION INTRAMUSCULAR; INTRAVENOUS; SUBCUTANEOUS at 22:21

## 2017-09-10 RX ADMIN — TUBERCULIN PURIFIED PROTEIN DERIVATIVE 5 UNITS: 5 INJECTION, SOLUTION INTRADERMAL at 17:00

## 2017-09-10 RX ADMIN — ALBUTEROL SULFATE 2.5 MG: 2.5 SOLUTION RESPIRATORY (INHALATION) at 08:10

## 2017-09-10 RX ADMIN — MORPHINE SULFATE 5 MG: 10 INJECTION INTRAMUSCULAR; INTRAVENOUS; SUBCUTANEOUS at 13:15

## 2017-09-10 RX ADMIN — PANTOPRAZOLE SODIUM 40 MG: 40 TABLET, DELAYED RELEASE ORAL at 05:15

## 2017-09-10 RX ADMIN — MORPHINE SULFATE 5 MG: 10 INJECTION INTRAMUSCULAR; INTRAVENOUS; SUBCUTANEOUS at 05:15

## 2017-09-10 RX ADMIN — HYDROCODONE BITARTRATE AND ACETAMINOPHEN 1 TABLET: 7.5; 325 TABLET ORAL at 03:48

## 2017-09-10 NOTE — PROGRESS NOTES
Problem: Mobility Impaired (Adult and Pediatric)  Goal: *Acute Goals and Plan of Care (Insert Text)  1. Ms. Susana Smallwood will perform supine to sit and sit to supine independently in 3 days. 2. Ms. Susana Smallwood will perform sit to stand and bed to chair independently in 3 days. 3. Ms. Susana Smallwood will perform gait with cane independently 400 ft in 3 days. PHYSICAL THERAPY: INITIAL ASSESSMENT 9/10/2017  OBSERVATION: Hospital Day: 2  Payor: [facility] MEDICARE / Plan: [facility] MEDICARE / Product Type: Managed Care Medicare /      NAME/AGE/GENDER: Evelin  is a 65 y.o. female             PRIMARY DIAGNOSIS: Rib fractures  Fracture of ribs, multiple, closed Fracture of multiple ribs Fracture of multiple ribs        ICD-10: Treatment Diagnosis:       · Other abnormalities of gait and mobility (R26.89)  · History of falling (Z91.81)   Precaution/Allergies:  Review of patient's allergies indicates no known allergies. ASSESSMENT:      Ms. Susana Smallwood presents in pain after suffering rib fractures from a fall in the bathtub. She is very anxious about \"being pushed out today\"  She says she cant be alone. Ms. Susana Smallwood currently requires min assist for supine to sit and contact guard for sit to stand due to pain. Once on her feet she is able to ambulate with her cane with stand by assist.  Encouraged her to hug a pillow and work on taking deep breaths. Also encouraged her to be out of the bed as much as possible. She was concerned about getting her pain medicine when she gets home and wanted someone to come to her house to help her. Let her know that  would be in to talk with her. Ms. Susana Smallwood presents below baseline function. At this time she is therefore appropriate for skilled PT to maximize her rehab potential.      This section established at most recent assessment   PROBLEM LIST (Impairments causing functional limitations):  1. Decreased Transfer Abilities  2.  Decreased Ambulation Ability/Technique  3. Increased Pain  4. Decreased Activity Tolerance    INTERVENTIONS PLANNED: (Benefits and precautions of physical therapy have been discussed with the patient.)  1. Bed Mobility  2. Gait Training  3. Therapeutic Activites  4. Transfer Training      TREATMENT PLAN: Frequency/Duration: 3 times a week for duration of hospital stay  Rehabilitation Potential For Stated Goals: GOOD      RECOMMENDED REHABILITATION/EQUIPMENT: (at time of discharge pending progress): Due to the probability of continued deficits (see above) this patient will likely need continued skilled physical therapy after discharge. Equipment:   · None at this time                   HISTORY:   History of Present Injury/Illness (Reason for Referral):  Patient is a 65 y.o.  female presents s/p fall with L flank pain. Patient has a history of ETOH abuse (reports her last drink was 6 years ago) and tobacco but is quitting and is down to 2 cigarettes / day. She was admitted 11/2015 at [facility] for ETOH abuse and [facility] anxiety with suicide attempts x 4 with xanax overdose and admission to [facility] x 4. She was most recently seen at [facility] 6/2017 and was admitted to [facility] for ETOH abuse. Patient presents to the ER after falling in the bathroom. She recalls hitting her head but denies LOC. She states that she landed on her R side and has multiple abrasions. She presented to the ER for further evaluation. CT head without acute abnormality. CT chest with multiple rib fractures (9-11), small R pleural fluid collection, and small anterior R PTX. She received medication for pain but continues to c/o significant discomfort. She will be admitted for pain control. Past Medical History/Comorbidities:   Ms. Tatiana Macias  has a past medical history of Alcohol abuse (10/31/2015); Alcohol withdrawal ([facility] (10/31/2015); Asthma; Other ill-defined conditions;  Other ill-defined conditions; Psychiatric disorder; and Seizures [facility]). She also has no past medical history of Arthritis; [facility] CAD (coronary artery disease); Cancer ([facility] Chronic kidney disease; COPD; DEMENTIA; Diabetes ([facility] Endocrine disease; Heart failure ([facility] Hypertension; Infectious disease; Liver disease; PUD (peptic ulcer disease); Sleep disorder; Stroke [facility]); or Thromboembolus ([facility] Ms. Sharif Smith  has a past surgical history that includes gyn; orthopaedic; and heent. Social History/Living Environment:   Home Environment: Apartment  # Steps to Enter: 0  One/Two Story Residence: One story  # of Interior Steps:  (0)  Height of Each Step (in):  (0)  Interior Rails: None  Lift Chair Available: No  Living Alone: Yes  Support Systems: Friends \ neighbors  Patient Expects to be Discharged to[de-identified] Apartment  Current DME Used/Available at Home: Cane, straight  Tub or Shower Type: Tub/Shower combination  Prior Level of Function/Work/Activity:  independent  ETOH history, psych disorder   Number of Personal Factors/Comorbidities that affect the Plan of Care: 1-2: MODERATE COMPLEXITY   EXAMINATION:   Most Recent Physical Functioning:   Gross Assessment:  AROM: Generally decreased, functional  Strength: Generally decreased, functional               Posture:  Posture (WDL): Within defined limits  Balance:  Sitting: Intact  Standing: Impaired; With support  Standing - Static: Good  Standing - Dynamic : Good Bed Mobility:  Supine to Sit: Minimum assistance  Wheelchair Mobility:     Transfers:  Sit to Stand: Contact guard assistance  Stand to Sit: Contact guard assistance  Gait:     Base of Support: Widened  Step Length: Right shortened;Left shortened  Distance (ft): 100 Feet (ft)  Assistive Device: Cane, straight  Ambulation - Level of Assistance: Stand-by asssistance  Interventions: Verbal cues; Tactile cues; Safety awareness training;Manual cues       Body Structures Involved:  1. Muscles Body Functions Affected:  1.  Movement Related Activities and Participation Affected:  1. Mobility   Number of elements that affect the Plan of Care: 3: MODERATE COMPLEXITY   CLINICAL PRESENTATION:   Presentation: Evolving clinical presentation with changing clinical characteristics: MODERATE COMPLEXITY   CLINICAL DECISION MAKIN [facility] Inpatient Short Form  How much difficulty does the patient currently have. .. Unable A Lot A Little None   1. Turning over in bed (including adjusting bedclothes, sheets and blankets)? [ ] 1   [X] 2   [ ] 3   [ ] 4   2. Sitting down on and standing up from a chair with arms ( e.g., wheelchair, bedside commode, etc.)   [ ] 1   [ ] 2   [X] 3   [ ] 4   3. Moving from lying on back to sitting on the side of the bed? [ ] 1   [X] 2   [ ] 3   [ ] 4   How much help from another person does the patient currently need. .. Total A Lot A Little None   4. Moving to and from a bed to a chair (including a wheelchair)? [ ] 1   [ ] 2   [X] 3   [ ] 4   5. Need to walk in hospital room? [ ] 1   [ ] 2   [X] 3   [ ] 4   6. Climbing 3-5 steps with a railing? [ ] 1   [ ] 2   [X] 3   [ ] 4   © , Trustees of [address] [place] [address], under license to [employer]. All rights reserved    Score:  Initial: 16 Most Recent: X (Date: -- )     Interpretation of Tool:  Represents activities that are increasingly more difficult (i.e. Bed mobility, Transfers, Gait).        Score 24 23 22-20 19-15 14-10 9-7 6       Modifier CH CI CJ CK CL CM CN         · Mobility - Walking and Moving Around:               - CURRENT STATUS:    CK - 40%-59% impaired, limited or restricted               - GOAL STATUS:           CK - 40%-59% impaired, limited or restricted               - D/C STATUS:                       ---------------To be determined---------------  Payor: [facility] [place] [facility] / Plan: [place] [facility] [place] / Product Type: Managed Care Medicare /       Medical Necessity:     · Patient is expected to demonstrate progress in functional technique to increase independence with mobility and gait. .  Reason for Services/Other Comments:  · Patient continues to require present interventions due to patient's inability to function at baseline. .   Use of outcome tool(s) and clinical judgement create a POC that gives a: Questionable prediction of patient's progress: MODERATE COMPLEXITY                 TREATMENT:   (In addition to Assessment/Re-Assessment sessions the following treatments were rendered)   Pre-treatment Symptoms/Complaints:  Pain, anxiety. Pain: Initial:   Pain Intensity 1: 5  Pain Intervention(s) 1: Emotional support  Post Session:  More settled and felt better up in the chair. Assessment/Reassessment only, no treatment provided today     Braces/Orthotics/Lines/Etc:   · O2 Device: Room air  Treatment/Session Assessment:    · Response to Treatment:  good  · Interdisciplinary Collaboration:  · Registered Nurse  · After treatment position/precautions:  · Up in chair  · Call light within reach  · RN notified  · Compliance with Program/Exercises: compliant all of the time. · Recommendations/Intent for next treatment session: \"Next visit will focus on advancements to more challenging activities and reduction in assistance provided\".   Total Treatment Duration:  PT Patient Time In/Time Out  Time In: 1015  Time Out: [address] [place] Hossein Medina, PT

## 2017-09-10 NOTE — PROGRESS NOTES
Patient c/o pain to lower right side despite use of norco request prn morphine, morphine given @ this time IV slow push.

## 2017-09-10 NOTE — PROGRESS NOTES
Patient is sitting up in bed in room, on O2 @ 4L n/c,  alert and oriented X3, calm and cooperative, patient IV fluids infusing, IV intact, no c/o pain @ this time, no c/o SOB or dyspnea, call light within reach.

## 2017-09-10 NOTE — PROGRESS NOTES
Patient resting quietly after prn dose of IV morphine given slow push, no c/o pain or discomfort, RR even and unlabored, no needs voiced @ this time, call light within reach.

## 2017-09-10 NOTE — PROGRESS NOTES
Patient has c/o significant pain to R side lower back prn pain medications effective, however with movement medications do not work as well per patient, patient readjusted in bed throughout night numerous times, only comfortable if lying still, patient currently resting in bed with eyes closed, call light within reach.

## 2017-09-10 NOTE — PROGRESS NOTES
Shift assessment complete. Pt resting in bed upon entering room. Helped patient to restroom. Instructed to call when finished. Pt has some pain, will medicated at allowed. Safety measures in place. Call light in reach.

## 2017-09-10 NOTE — PROGRESS NOTES
Francisco Zhou  Admission Date: 9/9/2017             Daily Progress Note: 9/10/2017    The patient's chart is reviewed and the patient is discussed with the staff. Patient is a 65 y.o.  female presents s/p fall with L flank pain. Patient has a history of ETOH abuse (reports her last drink was 6 years ago) and tobacco but is quitting and is down to 2 cigarettes / day. She was admitted 11/2015 at [address] [place] [address] for ETOH abuse and [facility] lists anxiety with suicide attempts x 4 with xanax overdose and admission to [facility] x 4. She was most recently seen at [facility] 6/2017 and was admitted to [facility] for ETOH abuse.       Patient presents to the ER after falling in the bathroom. She recalls hitting her head but denies LOC. She states that she landed on her R side and has multiple abrasions. She presented to the ER for further evaluation. CT head without acute abnormality. CT chest with multiple rib fractures (9-11), small R pleural fluid collection, and small anterior R PTX. She received medication for pain but continues to c/o significant discomfort. She will be admitted for pain control.        Subjective:     uncontrollable pain   Keeps asking for pain meds  Says she cannot go home     Current Facility-Administered Medications   Medication Dose Route Frequency    morphine injection 5 mg  5 mg IntraVENous Q3H PRN    HYDROcodone-acetaminophen (NORCO) 7.5-325 mg per tablet 1 Tab  1 Tab Oral Q4H PRN    LORazepam (ATIVAN) tablet 1 mg  1 mg Oral Q6H PRN    sodium chloride (NS) flush 5-10 mL  5-10 mL IntraVENous Q8H    sodium chloride (NS) flush 5-10 mL  5-10 mL IntraVENous PRN    albuterol (PROVENTIL VENTOLIN) nebulizer solution 2.5 mg  2.5 mg Nebulization QID RT    ondansetron (ZOFRAN) injection 4 mg  4 mg IntraVENous Q4H PRN    bisacodyl (DULCOLAX) tablet 5 mg  5 mg Oral DAILY PRN    enoxaparin (LOVENOX) injection 40 mg  40 mg SubCUTAneous Q24H    pantoprazole (PROTONIX) tablet 40 mg  40 mg Oral ACB    0.9% sodium chloride 1,000 mL with mvi, adult no. 4 with vit K 10 mL, thiamine 897 mg, folic acid 1 mg infusion   IntraVENous Q24H    influenza vaccine 2015-16 (36mos+)(PF) (FLUZONE/FLUARIX QUAD) injection 0.5 mL  0.5 mL IntraMUSCular PRIOR TO DISCHARGE       Review of Systems    Constitutional: negative for fever, chills, sweats  Cardiovascular: negative for chest pain, palpitations, syncope, edema  Gastrointestinal:  negative for dysphagia, reflux, vomiting, diarrhea, abdominal pain, or melena  Neurologic:  negative for focal weakness, numbness, headache    Objective:     Vitals:    09/10/17 0738 09/10/17 0811 09/10/17 1125 09/10/17 1130   BP: 117/71   105/65   Pulse: 77   82   Resp: 20   18   Temp: 98.1 °F (36.7 °C)   98.4 °F (36.9 °C)   SpO2: 95% 92% 96% 96%   Weight:       Height:         Intake and Output:   09/08 1901 - 09/10 0700  In: 700 [I.V.:700]  Out: -        Physical Exam:   Constitution:  the patient is well developed and in no acute distress  EENMT:  Sclera clear, pupils equal, oral mucosa moist  Respiratory: clear  Cardiovascular:  RRR without M,G,R  Gastrointestinal: soft and non-tender; with positive bowel sounds. Musculoskeletal: warm without cyanosis. There is no lower leg edema. Skin:  no jaundice or rashes, no wounds   Neurologic: no gross neuro deficits     Psychiatric:  alert and oriented x 3    CXR: none today       LAB  No results for input(s): GLUCPOC in the last 72 hours.     No lab exists for component: [address]      09/09/17   1207   WBC  9.8   HGB  14.0   HCT  41.2   PLT  327     Recent Labs      09/09/17   1207   NA  134*   K  4.0   CL  97*   CO2  28   GLU  107*   BUN  9   CREA  0.57*   CA  8.9   ALB  3.5   TBILI  0.8   ALT  12   SGOT  29         Assessment:  (Medical Decision Making)     Hospital Problems  Date Reviewed: 9/9/2017          Codes Class Noted POA    Tobacco abuse (Chronic) ICD-10-CM: Z72.0  ICD-9-CM: 305.1  9/9/2017 Yes * (Principal)Fracture of multiple ribs ICD-10-CM: S22.49XA  ICD-9-CM: 807.09  9/9/2017 Yes    Overview Signed 9/9/2017  3:16 PM by Smitha Lowry NP     Ribs 9-11             Fall    Reports she fell in the bathtub hitting her right side. She states that she pulled down a towel rack on her way down sustaining multiple abrasions to face and LE      ICD-10-CM: W19. Alvaro Mc  ICD-9-CM: E888.9  9/9/2017 Yes        Pneumothorax  Small  ICD-10-CM: J93.9  ICD-9-CM: 512.89  9/9/2017 Yes        H/O ETOH abuse (Chronic)      Patient states that she has not had a drink in 6 years and yet she was admitted to [facility] 11/2015 for ETOH withdrawal and at [facility] 6/2017 for ETOH withdrawl and was admitted to [facility] from there     Patient also has several admissions to [facility] per Care Everywhere for multiple suicide attempts.         ICD-10-CM: Z87.898  ICD-9-CM: 305.03  9/9/2017 Yes        Rib fractures ICD-10-CM: S22.39XA  ICD-9-CM: 807.00  9/9/2017 Unknown        Fracture of ribs, multiple, closed ICD-10-CM: S22.49XA  ICD-9-CM: 807.09  9/9/2017 Unknown              Plan:  (Medical Decision Making)     -- pain control, will start scheduled norco  - she will need placement ,case management involved     More than 50% of the time documented was spent in face-to-face contact with the patient and in the care of the patient on the floor/unit where the patient is located.     Marisabel Hodge MD

## 2017-09-10 NOTE — PROGRESS NOTES
Met with patient regarding discharge planning. Patient is independent in all ADLs at baseline. She lives alone in the [facility] and normally ambulates with a cane. Patient fell in the shower and suffered broken ribs and now has intractable pain. Patient voices concern about her ability to care for herself if discharged home. She was changed from Observation to Inpatient status today. Patient would like to go to the 9th floor for acute rehab if possible. If she does not meet medical necessity for 9th floor for some reason, patient requests that we send referral to several facilities in the area for short-term rehab to see who has beds available, and she can decide on a facility from there. PPD/PT/OT have all been ordered. A consult to Physiatry has been placed for possible 9th floor admission. Case Management will continue to follow for discharge planning.     Care Management Interventions  Transition of Care Consult (CM Consult): Discharge Planning  Discharge Durable Medical Equipment: No  Physical Therapy Consult: Yes  Occupational Therapy Consult: Yes  Speech Therapy Consult: No  Current Support Network: Own Home, Lives Alone  Confirm Follow Up Transport: Self  Plan discussed with Pt/Family/Caregiver: Yes  Freedom of Choice Offered: Yes  Discharge Location  Discharge Placement: Other: (To be determined.)

## 2017-09-10 NOTE — PROGRESS NOTES
Brief visit to encourage. Patient is calm.     Kristen Schneider, staff [address], [address] [facility]  /   [email or web address]

## 2017-09-10 NOTE — PROGRESS NOTES
Problem: Interdisciplinary Rounds  Goal: Interdisciplinary Rounds  Outcome: Progressing Towards Goal  Interdisciplinary team rounds were held 9/10/2017 with the following team members:Nursing, Physical Therapy and Clinical Coordinator and the patient. Plan of care discussed. See clinical pathway and/or care plan for interventions and desired outcomes.

## 2017-09-11 ENCOUNTER — APPOINTMENT (OUTPATIENT)
Dept: GENERAL RADIOLOGY | Age: 62
DRG: 184 | End: 2017-09-11
Attending: INTERNAL MEDICINE
Payer: MEDICARE

## 2017-09-11 PROCEDURE — 74011250637 HC RX REV CODE- 250/637: Performed by: INTERNAL MEDICINE

## 2017-09-11 PROCEDURE — 65270000029 HC RM PRIVATE

## 2017-09-11 PROCEDURE — 74011250636 HC RX REV CODE- 250/636: Performed by: NURSE PRACTITIONER

## 2017-09-11 PROCEDURE — 71010 XR CHEST SNGL V: CPT

## 2017-09-11 PROCEDURE — 74011250637 HC RX REV CODE- 250/637: Performed by: NURSE PRACTITIONER

## 2017-09-11 PROCEDURE — 94760 N-INVAS EAR/PLS OXIMETRY 1: CPT

## 2017-09-11 PROCEDURE — 94640 AIRWAY INHALATION TREATMENT: CPT

## 2017-09-11 PROCEDURE — 99232 SBSQ HOSP IP/OBS MODERATE 35: CPT | Performed by: INTERNAL MEDICINE

## 2017-09-11 PROCEDURE — 77010033678 HC OXYGEN DAILY

## 2017-09-11 PROCEDURE — 97165 OT EVAL LOW COMPLEX 30 MIN: CPT

## 2017-09-11 PROCEDURE — 97535 SELF CARE MNGMENT TRAINING: CPT

## 2017-09-11 PROCEDURE — 74011000250 HC RX REV CODE- 250: Performed by: NURSE PRACTITIONER

## 2017-09-11 RX ORDER — DOCUSATE SODIUM 100 MG/1
100 CAPSULE, LIQUID FILLED ORAL 2 TIMES DAILY
Status: DISCONTINUED | OUTPATIENT
Start: 2017-09-11 | End: 2017-09-13

## 2017-09-11 RX ORDER — TRAMADOL HYDROCHLORIDE 50 MG/1
100 TABLET ORAL
Status: DISCONTINUED | OUTPATIENT
Start: 2017-09-11 | End: 2017-09-12

## 2017-09-11 RX ADMIN — DOCUSATE SODIUM 100 MG: 100 CAPSULE, LIQUID FILLED ORAL at 12:26

## 2017-09-11 RX ADMIN — HYDROCODONE BITARTRATE AND ACETAMINOPHEN 1 TABLET: 10; 325 TABLET ORAL at 13:51

## 2017-09-11 RX ADMIN — HYDROCODONE BITARTRATE AND ACETAMINOPHEN 1 TABLET: 10; 325 TABLET ORAL at 08:20

## 2017-09-11 RX ADMIN — MORPHINE SULFATE 5 MG: 10 INJECTION INTRAMUSCULAR; INTRAVENOUS; SUBCUTANEOUS at 20:28

## 2017-09-11 RX ADMIN — DOCUSATE SODIUM 100 MG: 100 CAPSULE, LIQUID FILLED ORAL at 21:39

## 2017-09-11 RX ADMIN — Medication 5 ML: at 21:39

## 2017-09-11 RX ADMIN — HYDROCODONE BITARTRATE AND ACETAMINOPHEN 1 TABLET: 10; 325 TABLET ORAL at 19:30

## 2017-09-11 RX ADMIN — BISACODYL 5 MG: 5 TABLET, COATED ORAL at 08:20

## 2017-09-11 RX ADMIN — ALBUTEROL SULFATE 2.5 MG: 2.5 SOLUTION RESPIRATORY (INHALATION) at 11:35

## 2017-09-11 RX ADMIN — ENOXAPARIN SODIUM 40 MG: 40 INJECTION SUBCUTANEOUS at 17:42

## 2017-09-11 RX ADMIN — Medication 5 ML: at 13:52

## 2017-09-11 RX ADMIN — TRAMADOL HYDROCHLORIDE 100 MG: 50 TABLET, FILM COATED ORAL at 17:42

## 2017-09-11 RX ADMIN — HYDROCODONE BITARTRATE AND ACETAMINOPHEN 1 TABLET: 10; 325 TABLET ORAL at 02:14

## 2017-09-11 RX ADMIN — ALBUTEROL SULFATE 2.5 MG: 2.5 SOLUTION RESPIRATORY (INHALATION) at 07:42

## 2017-09-11 RX ADMIN — LORAZEPAM 1 MG: 1 TABLET ORAL at 21:39

## 2017-09-11 RX ADMIN — ALBUTEROL SULFATE 2.5 MG: 2.5 SOLUTION RESPIRATORY (INHALATION) at 15:47

## 2017-09-11 RX ADMIN — MORPHINE SULFATE 5 MG: 10 INJECTION INTRAMUSCULAR; INTRAVENOUS; SUBCUTANEOUS at 11:48

## 2017-09-11 RX ADMIN — PANTOPRAZOLE SODIUM 40 MG: 40 TABLET, DELAYED RELEASE ORAL at 05:21

## 2017-09-11 RX ADMIN — ALBUTEROL SULFATE 2.5 MG: 2.5 SOLUTION RESPIRATORY (INHALATION) at 20:06

## 2017-09-11 NOTE — PROGRESS NOTES
Problem: Self Care Deficits Care Plan (Adult)  Goal: *Acute Goals and Plan of Care (Insert Text)  1. Patient will perform bathing with minimal assistance within 7 days with equipment as needed. 2. Patient will perform upper body dressing with minimal assistance within 7 days with equipment as needed. 3. Patient will perform toileting and toilet transfer with supervision within 7 days with equipment as needed. 4. Patient will perform lower body dressing with minimal assistance within 7 days with equipment as needed. 5. Pt will participate in B UE therapeutic exercises for 8 minutes with 3 rest breaks within 7 days. OCCUPATIONAL THERAPY: Initial Assessment and Treatment Day: 1st 9/11/2017  INPATIENT: Hospital Day: 3  Payor: [facility] [place] MEDICARE / Plan: [facility] MEDICARE / Product Type: [employer] Medicare /      NAME/AGE/GENDER: Francy Rodriguez is a 65 y.o. female             PRIMARY DIAGNOSIS:  Rib fractures  Fracture of ribs, multiple, closed  Pneumothorax Fracture of multiple ribs Fracture of multiple ribs        ICD-10: Treatment Diagnosis:        · Generalized Muscle Weakness (M62.81)   Precautions/Allergies:        Fall, Review of patient's allergies indicates no known allergies. ASSESSMENT:      Ms. Woody Riojas admitted to hospital via EMS after a fall in her bathtub, where she sustained rib fractures. Prior to admit, patient resided at [facility] in an [address] [place] [address] apartment, on 7th floor. Patient ambulated with a standard cane and was modified independent with ADLs as well. Patient reports pain right rib area, limiting her B shoulder AROM, hindering her with ADLs. Patient sitting in chair upon arrival.  Patient stood with CGA using SC and ambulated to sink with CGA for balance. Patient then brushed her teeth with CGA and verbal cues to compensate to reduce rib pain with task. Patient then ambulated to chair with CGA using SC and sat in chair with SBA.   Patient functioning below baseline. Recommend OT Skilled services to maximize ADL performance. Cotn OT per tx plan. This section established at most recent assessment   PROBLEM LIST (Impairments causing functional limitations):  1. Decreased Strength  2. Decreased ADL/Functional Activities  3. Decreased Transfer Abilities  4. Decreased Ambulation Ability/Technique  5. Decreased Balance  6. Increased Pain  7. Decreased Activity Tolerance  8. Decreased Work Simplification/Energy Conservation Techniques  9. Decreased Flexibility/Joint Mobility  10. Decreased [place] with Home Exercise Program    INTERVENTIONS PLANNED: (Benefits and precautions of occupational therapy have been discussed with the patient.)  1. Activities of daily living training  2. Adaptive equipment training  3. Balance training  4. Clothing management  5. Cognitive training  6. Donning&doffing training  7. Group therapy  8. Hygiene training  9. Medication management training  10. Neuromuscular re-eduation  11. Sensory reintegration training  12. Therapeutic activity  13. Therapeutic exercise      TREATMENT PLAN: Frequency/Duration: Follow patient 3x's/wk to address above goals. Rehabilitation Potential For Stated Goals: GOOD      RECOMMENDED REHABILITATION/EQUIPMENT: (at time of discharge pending progress): Due to the probability of continued deficits (see above) this patient will likely need continued skilled occupational therapy after discharge. Equipment:   · None at this time                      OCCUPATIONAL PROFILE AND HISTORY:   History of Present Injury/Illness (Reason for Referral):  Patient is a 65 y.o.  female presents s/p fall with L flank pain. Patient has a history of ETOH abuse (reports her last drink was 6 years ago) and tobacco but is quitting and is down to 2 cigarettes / day.   She was admitted 11/2015 at [facility] for ETOH abuse and Care Everywhere lists anxiety with suicide attempts x 4 with xanax overdose and admission to [address] [place] [address] x 4. She was most recently seen at [facility] 6/2017 and was admitted to [address] [place] [address] for ETOH abuse. Patient presents to the ER after falling in the bathroom. She recalls hitting her head but denies LOC. She states that she landed on her R side and has multiple abrasions. She presented to the ER for further evaluation. CT head without acute abnormality. CT chest with multiple rib fractures (9-11), small R pleural fluid collection, and small anterior R PTX. She received medication for pain but continues to c/o significant discomfort. She will be admitted for pain control. Past Medical History/Comorbidities:   Ms. Deysi Alanis  has a past medical history of Alcohol abuse (10/31/2015); Alcohol withdrawal ([address] (10/31/2015); Asthma; Other ill-defined conditions; Other ill-defined conditions; Psychiatric disorder; and Seizures ([address] She also has no past medical history of Arthritis; Autoimmune disease ([address] CAD (coronary artery disease); Cancer ([address] Chronic kidney disease; COPD; DEMENTIA; Diabetes ([address] Endocrine disease; Heart failure ([address] Hypertension; Infectious disease; Liver disease; PUD (peptic ulcer disease); Sleep disorder; Stroke [facility]); or Thromboembolus ([facility] Ms. Deysi Alanis  has a past surgical history that includes gyn; orthopaedic; and heent. Social History/Living Environment:   Home Environment: Apartment  # Steps to Enter: 0  One/Two Story Residence: One story  # of Interior Steps:  (0)  Height of Each Step (in):  (0)  Interior Rails: None  Lift Chair Available: No  Living Alone: Yes  Support Systems: Friends \ neighbors  Patient Expects to be Discharged to[de-identified] Apartment  Current DME Used/Available at Home: Cane, straight, Grab bars  Tub or Shower Type: Tub/Shower combination  Prior Level of Function/Work/Activity:  Prior to admit, patient resided at [facility] in an [address], on 7th floor. Patient ambulated with a standard cane and was modified independent with ADLs as well.   Patient sitting in chair upon entrance. Patient stood with CGA and ambulated to sink using SC with CGA. Patient brushed her teeth with CGA for balance and verbal cues for compensatory techniques to reduce pain with task. Patient functioning below baseline. Patient's pain limits her performance and safety at this time. Patient to benefit from Occupational Therapy to maximize ADL performance. Cont OT per tx plan. Number of Personal Factors/Comorbidities that affect the Plan of Care: Brief history (0):  LOW COMPLEXITY   ASSESSMENT OF OCCUPATIONAL PERFORMANCE[de-identified]   Activities of Daily Living:           Basic ADLs (From Assessment) Complex ADLs (From Assessment)   Basic ADL  Feeding: Setup  Oral Facial Hygiene/Grooming: Contact guard assistance (to brush teeth standing sink side)  Bathing: Moderate assistance (agrawal by pain)  Upper Body Dressing: Maximum assistance (agrawal by rib pain)  Lower Body Dressing: Moderate assistance (agrawal by rib pain)  Toileting: Contact guard assistance Instrumental ADL  Meal Preparation: Minimum assistance  Homemaking: Maximum assistance  Medication Management: Setup  Financial Management: Setup   Grooming/Bathing/Dressing Activities of Daily Living     Cognitive Retraining  Safety/Judgement: Awareness of environment; Insight into deficits; Fall prevention                       Bed/Mat Mobility  Sit to Stand: Contact guard assistance          Most Recent Physical Functioning:   Gross Assessment:                  Posture:  Posture (WDL): Within defined limits  Balance:    Bed Mobility:     Wheelchair Mobility:     Transfers:  Sit to Stand: Contact guard assistance  Stand to Sit: Stand-by asssistance                    Patient Vitals for the past 6 hrs:       BP BP Patient Position SpO2 O2 Flow Rate (L/min) Pulse   09/11/17 0743 - - 98 % 4 l/min -   09/11/17 0752 110/67 Sitting 100 % - 74   09/11/17 1137 - - 97 % 3 l/min -        Mental Status  Neurologic State: Alert  Orientation Level: Oriented X4  Cognition: Appropriate for age attention/concentration  Perception: Appears intact  Safety/Judgement: Awareness of environment, Insight into deficits, Fall prevention                               Physical Skills Involved:  1. Range of Motion  2. Balance  3. Strength  4. Activity Tolerance  5. Pain (acute) Cognitive Skills Affected (resulting in the inability to perform in a timely and safe manner):  None Psychosocial Skills Affected:  1. Habits/Routines  2. Environmental Adaptation  3. Social Interaction   Number of elements that affect the Plan of Care: 3-5:  MODERATE COMPLEXITY   CLINICAL DECISION MAKIN[address] [place] [address] AM-PAC 6 Clicks   Daily Activity Inpatient Short Form  How much help from another person does the patient currently need. .. Total A Lot A Little None   1. Putting on and taking off regular lower body clothing?   [ ] 1   [X] 2   [ ] 3   [ ] 4   2. Bathing (including washing, rinsing, drying)? [ ] 1   [X] 2   [ ] 3   [ ] 4   3. Toileting, which includes using toilet, bedpan or urinal?   [ ] 1   [ ] 2   [X] 3   [ ] 4   4. Putting on and taking off regular upper body clothing?   [ ] 1   [X] 2   [ ] 3   [ ] 4   5. Taking care of personal grooming such as brushing teeth? [ ] 1   [ ] 2   [X] 3   [ ] 4   6. Eating meals? [ ] 1   [ ] 2   [X] 3   [ ] 4   © , Trustees of [address] [place] [address], under license to [employer]. All rights reserved    Score:  Initial: CK Most Recent: X (Date: -- )     Interpretation of Tool:  Represents activities that are increasingly more difficult (i.e. Bed mobility, Transfers, Gait).        Score 24 23 22-20 19-15 14-10 9-7 6       Modifier CH CI CJ CK CL CM CN         · Self Care:               - CURRENT STATUS:    CK - 40%-59% impaired, limited or restricted               - GOAL STATUS:           CJ - 20%-39% impaired, limited or restricted               - D/C STATUS:                       ---------------To be determined---------------  Payor: [employer] [facility] [place] MEDICARE / Plan: [place] [facility] [place] / Product Type: Managed Care Medicare /       Medical Necessity:     · Patient demonstrates good rehab potential due to higher previous functional level. Reason for Services/Other Comments:  · Patient continues to require skilled intervention due to patient's inability to take care of self. Use of outcome tool(s) and clinical judgement create a POC that gives a: LOW COMPLEXITY             TREATMENT:   (In addition to Assessment/Re-Assessment sessions the following treatments were rendered)      Pre-treatment Symptoms/Complaints:    Pain: Initial:   Pain Intensity 1: 7  Pain Location 1: Rib cage  Pain Orientation 1: Right  Pain Intervention(s) 1: Emotional support, Position  Post Session:  7      Self Care: (8 minutes): Procedure(s) (per grid) utilized to improve and/or restore self-care/home management as related to grooming. Required minimal verbal and tactile cueing to facilitate activities of daily living skills and compensatory activities. Braces/Orthotics/Lines/Etc:   · IV  · O2 Device: Nasal cannula  Treatment/Session Assessment:    · Response to Treatment:  positive  · Interdisciplinary Collaboration:  · Physical Therapist  · Registered Nurse  · After treatment position/precautions:  · Up in chair  · Bed/Chair-wheels locked  · Call light within reach  · RN notified  · Compliance with Program/Exercises: compliant all of the time. · Recommendations/Intent for next treatment session: \"Next visit will focus on advancements to more challenging activities and reduction in assistance provided\".   Total Treatment Duration:  OT Patient Time In/Time Out  Time In: 1004  Time Out: [facility]

## 2017-09-11 NOTE — PROGRESS NOTES
Patient was declined for 9th floor, acute rehab. We will request admission to STR at a local SNF. Case Management will continue to follow.     Care Management Interventions  Transition of Care Consult (CM Consult): Discharge Planning  Discharge Durable Medical Equipment: No  Physical Therapy Consult: Yes  Occupational Therapy Consult: Yes  Speech Therapy Consult: No  Current Support Network: Own Home, Lives Alone  Confirm Follow Up Transport: Self  Plan discussed with Pt/Family/Caregiver: Yes  Freedom of Choice Offered: Yes  Discharge Location  Discharge Placement: Other: (To be determined.)

## 2017-09-11 NOTE — PROGRESS NOTES
Caren Jerez  Admission Date: 9/9/2017             Daily Progress Note: 9/11/2017    The patient's chart is reviewed and the patient is discussed with the staff. Patient is a 65 y.o.  female presents s/p fall with L flank pain. Patient has a history of ETOH abuse (reports her last drink was 6 years ago) and tobacco but is quitting and is down to 2 cigarettes / day. She was admitted 11/2015 at [facility] for ETOH abuse and [facility] lists anxiety with suicide attempts x 4 with xanax overdose and admission to [facility] x 4. She was most recently seen at [facility] 6/2017 and was admitted to [facility] for ETOH abuse.       Patient presents to the ER after falling in the bathroom. She recalls hitting her head but denies LOC. She states that she landed on her R side and has multiple abrasions. She presented to the ER for further evaluation. CT head without acute abnormality. CT chest with multiple rib fractures (9-11), small R pleural fluid collection, and small anterior R PTX. She received medication for pain but continues to c/o significant discomfort. She will be admitted for pain control. Subjective:     Still having a lot of pain. Oxycodone was changed to scheduled yesterday and still very limited by pain. From 7 to about 6 with pain meds.      Current Facility-Administered Medications   Medication Dose Route Frequency    HYDROcodone-acetaminophen (NORCO)  mg tablet 1 Tab  1 Tab Oral Q6H    tuberculin injection 5 Units  5 Units IntraDERMal ONCE    morphine injection 5 mg  5 mg IntraVENous Q3H PRN    HYDROcodone-acetaminophen (NORCO) 7.5-325 mg per tablet 1 Tab  1 Tab Oral Q4H PRN    LORazepam (ATIVAN) tablet 1 mg  1 mg Oral Q6H PRN    sodium chloride (NS) flush 5-10 mL  5-10 mL IntraVENous Q8H    sodium chloride (NS) flush 5-10 mL  5-10 mL IntraVENous PRN    albuterol (PROVENTIL VENTOLIN) nebulizer solution 2.5 mg  2.5 mg Nebulization QID RT    ondansetron [facility]) injection 4 mg  4 mg IntraVENous Q4H PRN    bisacodyl (DULCOLAX) tablet 5 mg  5 mg Oral DAILY PRN    enoxaparin (LOVENOX) injection 40 mg  40 mg SubCUTAneous Q24H    pantoprazole (PROTONIX) tablet 40 mg  40 mg Oral ACB    0.9% sodium chloride 1,000 mL with mvi, adult no. 4 with vit K 10 mL, thiamine 866 mg, folic acid 1 mg infusion   IntraVENous Q24H    influenza vaccine 2015-16 (36mos+)(PF) (FLUZONE/FLUARIX QUAD) injection 0.5 mL  0.5 mL IntraMUSCular PRIOR TO DISCHARGE       Review of Systems    Constitutional: negative for fever, chills, sweats  Cardiovascular: negative for chest pain, palpitations, syncope, edema  Gastrointestinal:  negative for dysphagia, reflux, vomiting, diarrhea, abdominal pain, or melena  Neurologic:  negative for focal weakness, numbness, headache    Objective:     Vitals:    09/11/17 0555 09/11/17 0743 09/11/17 0752 09/11/17 1137   BP:   110/67    Pulse:   74    Resp:   18    Temp:   98.7 °F (37.1 °C)    SpO2:  98% 100% 97%   Weight: 120 lb 11.2 oz (54.7 kg)      Height:         Intake and Output:   09/09 1901 - 09/11 0700  In: 2980 [P.O.:1180; I.V.:1800]  Out: -        Physical Exam:   Constitution:  the patient is well developed and in no acute distress  EENMT:  Sclera clear, pupils equal, oral mucosa moist  Respiratory: CTA b/l but limited air movement. Cardiovascular:  RRR without M,G,R  Gastrointestinal: soft and non-tender; with positive bowel sounds. Musculoskeletal: warm without cyanosis. There is no lower leg edema. Skin:  no jaundice or rashes, no wounds   Neurologic: no gross neuro deficits     Psychiatric:  alert and oriented x 3    CXR: trace right pneumothorax. NO wheezing          LAB  No results for input(s): GLUCPOC in the last 72 hours.     No lab exists for component: [address]      09/09/17   1207   WBC  9.8   HGB  14.0   HCT  41.2   PLT  327     Recent Labs      09/09/17   1207   NA  134*   K  4.0   CL  97*   CO2  28   GLU  107*   BUN  9 CREA  0.57*   CA  8.9   ALB  3.5   TBILI  0.8   ALT  12   SGOT  29         Assessment:  (Medical Decision Making)     Hospital Problems  Date Reviewed: 9/9/2017          Codes Class Noted POA    Tobacco abuse (Chronic) ICD-10-CM: Z72.0  ICD-9-CM: 305.1  9/9/2017 Yes        * (Principal)Fracture of multiple ribs ICD-10-CM: S22.49XA  ICD-9-CM: 807.09  9/9/2017 Yes    Overview Signed 9/9/2017  3:16 PM by Gregoria Daugherty NP     Ribs 9-11             Fall    Reports she fell in the bathtub hitting her right side. She states that she pulled down a towel rack on her way down sustaining multiple abrasions to face and LE      ICD-10-CM: W19. Marianna Galea  ICD-9-CM: E888.9  9/9/2017 Yes        Pneumothorax  Small  ICD-10-CM: J93.9  ICD-9-CM: 512.89  9/9/2017 Yes        H/O ETOH abuse (Chronic)      Patient states that she has not had a drink in 6 years and yet she was admitted to [facility] 11/2015 for ETOH withdrawal and at [facility] 6/2017 for ETOH withdrawl and was admitted to [facility] from there     Patient also has several admissions to [facility] per Care Everywhere for multiple suicide attempts.         ICD-10-CM: Z87.898  ICD-9-CM: 305.03  9/9/2017 Yes        Rib fractures ICD-10-CM: S22.39XA  ICD-9-CM: 807.00  9/9/2017 Unknown        Fracture of ribs, multiple, closed ICD-10-CM: S22.49XA  ICD-9-CM: 807.09  9/9/2017 Unknown              Plan:  (Medical Decision Making)     -- pain control is still an issues, on scheduled meds and having IV morphine at 5 mg and barely contolling it. Did use ultram as outpatient and will add to see how she does  --add laxatives  -- she will need placement ,case management involved     More than 50% of the time documented was spent in face-to-face contact with the patient and in the care of the patient on the floor/unit where the patient is located.     Ishmael Bhatia MD

## 2017-09-11 NOTE — PROGRESS NOTES
Pt resting in bed, HOB elevated. Pt A&Ox4 on 2L O2 via NC. Shift assessment completed. Pt reporting 10/10 pain in rib cage at this time. Call light in place, pt instructed to call for assistance as needed.

## 2017-09-11 NOTE — CONSULTS
MD Israel   Medical Director  [address] [place] [address], [address] [facility]  Tel: [phone]     Physical Medicine & Rehabilitation Note-consult    Patient: Avis Rodriguez MRN: [id number]  SSN: [id number]    YOB: 1955  Age: 65 y.o. Sex: female      Admit Date: 9/9/2017  Admitting Physician: Darío Choudhary MD    Medical Decision Making/Plan/Recommend: Recommend sub acute rehab at SNF. Best care option is admission to SNF and sub acute rehab program. She has non acute medical conditions described below and functional deficits. She will benefit from daily skilled rehabilitation efforts and regular medical and nursing care at SNF. Continue PT, OT for LLE strengthening, mobility, transfers, and gait training. Will follow progress. Chief Complaint : Gait dysfunction secondary to below. Admit Diagnosis: Rib fractures;Fracture of ribs, multiple, closed; Pneumothor*  Fracture of multiple ribs 9-11  Tobacco abuse (9/9/2017)  Fall (9/9/2017)  Pneumothorax (9/9/2017)  H/O ETOH abuse (9/9/2017)  Pain  DVT risk  Acute Rehab Dx:  Debility    deconditioning  Mobility and ambulation deficits  Self Care/ADL deficits    Subjective:     Date of Evaluation:  September 11, 2017    HPI: Avis Rodriguez is a 65 y.o. female patient at [facility] who was admitted on 9/9/2017  by Darío Choudhary MD with below mentioned medical history ,is being seen for Physical Medicine and Rehabilitation. Avis Rodriguez fell in her bathtub and presented with left flank pain. CT head was without acute abnormality. CT chest showed fractures of the right ninth through 11th ribs with small right pleural fluid collection and small anterior right-sided pneumothorax. Patient is managed for pain and monitored for complications. We are consulted to assist with rehab needs and placement. The patient's admission course has been uneventful.  Patient starting to stand, taking steps, however shows significant functional deficits due to her pain and decreased balance. She requires SBA for transfers and ambulation presently. Park Crain is seen and examined today. Medical Records reviewed. She is independent at home at her baseline, requires a straight cane for balance at times. Medical  Dx:  Past Medical History:   Diagnosis Date    Alcohol abuse 10/31/2015    Alcohol withdrawal ([facility] [address] 10/31/2015    Asthma     Other ill-defined conditions     lumbar and thoracic scoliosis    Other ill-defined conditions     irregular heart rate    Psychiatric disorder     ADHD    Seizures (HCC)         Current Level of Function: bed mobility - min A, transfers - min A, decreased balance , ambulation 3 < 10' with using a RW. Prior Level of Function/Work/Activity:  independent  ETOH history, psych disorder    History reviewed. No pertinent family history. Social History   Substance Use Topics    Smoking status: Current Every Day Smoker     Packs/day: 0.25    Smokeless tobacco: Never Used    Alcohol use 0.0 oz/week     0 Standard drinks or equivalent per week     Past Surgical History:   Procedure Laterality Date    HX GYN      hysterectomy    HX HEENT      sinus surgery. deviated septum.  HX ORTHOPAEDIC      right knee      Prior to Admission medications    Medication Sig Start Date End Date Taking? Authorizing Provider   LORazepam (ATIVAN) 1 mg tablet Take 1 Tab by mouth every six (6) hours as needed for Anxiety. Max Daily Amount: 4 mg. 5/29/17  Yes Dallin Patel MD   gabapentin (NEURONTIN) 400 mg capsule Take 400 mg by mouth three (3) times daily. Yes Katarzyna Alexander MD     No Known Allergies     Review of Systems: Denies chest pain, shortness of breath, cough, headache, visual problems, abdominal pain, dysurea, calf pain. Pertinent positives are as noted in the medical records and unremarkable otherwise.     Objective:     Vitals:  Blood pressure 118/80, pulse 93, temperature 98.4 °F (36.9 °C), resp. rate 18, height 5' 5\" (1.651 m), weight 120 lb 11.2 oz (54.7 kg), SpO2 98 %, not currently breastfeeding. Temp (24hrs), Av.4 °F (36.9 °C), Min:98.1 °F (36.7 °C), Max:98.8 °F (37.1 °C)    BMI (calculated): 20.1 (17 0555)   Intake and Output:   1901 -  0700  In: 2980 [P.O.:1180; I.V.:1800]  Out: -     Physical Exam:   General: Alert and age appropriately oriented. No acute cardio respiratory distress. HEENT: Normocephalic,no scleral icterus  Oral mucosa moist without cyanosis   Lungs: Clear to auscultation  bilaterally. Respiration even and unlabored   Heart: Regular rate and rhythm, S1, S2   No  murmurs, clicks, rub or gallops   Abdomen: Soft, non-tender, nondistended. Bowel sounds present. No organomegaly. Genitourinary: Benign . Neuromuscular:      Grossly no focal motor deficits noted. Moves ankles. No sensory deficits distally. Skin/extremity: No rashes, no erythema. Wound covered. Labs/Studies:  Recent Results (from the past 72 hour(s))   CBC WITH AUTOMATED DIFF    Collection Time: 17 12:07 PM   Result Value Ref Range    WBC 9.8 4.3 - 11.1 K/uL    RBC 4.57 4.05 - 5.25 M/uL    HGB 14.0 11.7 - 15.4 g/dL    HCT 41.2 35.8 - 46.3 %    MCV 90.2 79.6 - 97.8 FL    MCH 30.6 26.1 - 32.9 PG    MCHC 34.0 31.4 - 35.0 g/dL    RDW 12.1 11.9 - 14.6 %    PLATELET 833 320 - 168 K/uL    MPV 9.7 (L) 10.8 - 14.1 FL    DF AUTOMATED      NEUTROPHILS 67 43 - 78 %    LYMPHOCYTES 22 13 - 44 %    MONOCYTES 10 4.0 - 12.0 %    EOSINOPHILS 1 0.5 - 7.8 %    BASOPHILS 0 0.0 - 2.0 %    IMMATURE GRANULOCYTES 0.2 0.0 - 5.0 %    ABS. NEUTROPHILS 6.5 1.7 - 8.2 K/UL    ABS. LYMPHOCYTES 2.1 0.5 - 4.6 K/UL    ABS. MONOCYTES 1.0 0.1 - 1.3 K/UL    ABS. EOSINOPHILS 0.1 0.0 - 0.8 K/UL    ABS. BASOPHILS 0.0 0.0 - 0.2 K/UL    ABS. IMM.  GRANS. 0.0 0.0 - 0.5 K/UL   METABOLIC PANEL, COMPREHENSIVE    Collection Time: 09/09/17 12:07 PM   Result Value Ref Range    Sodium 134 (L) 136 - 145 mmol/L    Potassium 4.0 3.5 - 5.1 mmol/L    Chloride 97 (L) 98 - 107 mmol/L    CO2 28 21 - 32 mmol/L    Anion gap 9 7 - 16 mmol/L    Glucose 107 (H) 65 - 100 mg/dL    BUN 9 8 - 23 MG/DL    Creatinine 0.57 (L) 0.6 - 1.0 MG/DL    GFR est AA >60 >60 ml/min/1.73m2    GFR est non-AA >60 >60 ml/min/1.73m2    Calcium 8.9 8.3 - 10.4 MG/DL    Bilirubin, total 0.8 0.2 - 1.1 MG/DL    ALT (SGPT) 12 12 - 65 U/L    AST (SGOT) 29 15 - 37 U/L    Alk. phosphatase 104 50 - 136 U/L    Protein, total 7.8 6.3 - 8.2 g/dL    Albumin 3.5 3.2 - 4.6 g/dL    Globulin 4.3 (H) 2.3 - 3.5 g/dL    A-G Ratio 0.8 (L) 1.2 - 3.5     ETHYL ALCOHOL    Collection Time: 09/09/17  7:42 PM   Result Value Ref Range    ALCOHOL(ETHYL),SERUM <3 MG/DL       Functional Assessment:  Reviewed participation and progress in therapies  Gross Assessment  AROM: Generally decreased, functional (09/10/17 1044)  Strength: Generally decreased, functional (09/10/17 1044)   Bed Mobility  Supine to Sit: Minimum assistance (09/10/17 1044)   Balance  Sitting: Intact (09/10/17 1044)  Standing: Impaired; With support (09/10/17 1044)  Standing - Static: Good (09/10/17 1044)  Standing - Dynamic : Good (09/10/17 1044)               Bed/Mat Mobility  Supine to Sit: Minimum assistance (09/10/17 1044)  Sit to Stand: Contact guard assistance (09/11/17 1044)     Ambulation:  Gait  Base of Support: Widened (09/10/17 1044)  Step Length: Right shortened;Left shortened (09/10/17 1044)  Ambulation - Level of Assistance: Stand-by asssistance (09/10/17 1044)  Distance (ft): 100 Feet (ft) (09/10/17 1044)  Assistive Device: Cane, straight (09/10/17 1044)  Interventions: Verbal cues; Tactile cues; Safety awareness training;Manual cues (09/10/17 1044)    Impression/Plan:     Principal Problem:    Fracture of multiple ribs (9/9/2017)      Overview: Ribs 9-11    Active Problems:    Tobacco abuse (9/9/2017)      Fall (9/9/2017)      Pneumothorax (9/9/2017)      H/O ETOH abuse (9/9/2017)      Rib fractures (9/9/2017)      Fracture of ribs, multiple, closed (9/9/2017)        Current Facility-Administered Medications   Medication Dose Route Frequency Provider Last Rate Last Dose    traMADol (ULTRAM) tablet 100 mg  100 mg Oral Q6H PRN Manpreet Barron MD        docusate sodium (COLACE) capsule 100 mg  100 mg Oral BID Cal Gutierrez MD   100 mg at 09/11/17 1226    HYDROcodone-acetaminophen (NORCO)  mg tablet 1 Tab  1 Tab Oral Q6H Rowena Hess MD   1 Tab at 09/11/17 1351    tuberculin injection 5 Units  5 Units IntraDERMal ONCE Feliberto Chirinos MD   5 Units at 09/10/17 1700    morphine injection 5 mg  5 mg IntraVENous Q3H PRN Margaret Davies NP   5 mg at 09/11/17 1148    HYDROcodone-acetaminophen ([address]) 7.5-325 mg per tablet 1 Tab  1 Tab Oral Q4H PRN Margaret Davies, NP   1 Tab at 09/10/17 1109    LORazepam (ATIVAN) tablet 1 mg  1 mg Oral Q6H PRN Margaret Davies, NP        sodium chloride (NS) flush 5-10 mL  5-10 mL IntraVENous Q8H Margaret Serratoius, NP   5 mL at 09/11/17 1352    sodium chloride (NS) flush 5-10 mL  5-10 mL IntraVENous PRN Margaret Davies, NP        albuterol (PROVENTIL VENTOLIN) nebulizer solution 2.5 mg  2.5 mg Nebulization QID RT Margaret Serratoius, NP   2.5 mg at 09/11/17 1135    ondansetron (ZOFRAN) injection 4 mg  4 mg IntraVENous Q4H PRN Margaret Davies, NP        bisacodyl (DULCOLAX) tablet 5 mg  5 mg Oral DAILY PRN Margaret Davies, NP   5 mg at 09/11/17 0820    enoxaparin (LOVENOX) injection 40 mg  40 mg SubCUTAneous Q24H Margaret Davies, NP   40 mg at 09/10/17 1655    pantoprazole (PROTONIX) tablet 40 mg  40 mg Oral ACB Margaret Davies NP   40 mg at 09/11/17 0521    influenza vaccine 2015-16 (36mos+)(PF) (FLUZONE/FLUARIX QUAD) injection 0.5 mL  0.5 mL IntraMUSCular PRIOR TO DISCHARGE Zhang Gonzalez MD          Recommendations: Recommend sub acute rehab at SNF.   Continue Acute Rehab Program.  Coordination of rehab/medical care. Counseling of Physical Medicine & Rehab care issues management. Monitoring and management of rehab conditions per the plan of care/orders. Will follow along with you for PM&R issues and provide you follow up. Will follow with SW/ /Admissions Coordinators regarding insurance approvals and acceptance. Rehabilitation Management/ Medical Management: 1. Devices:Walkers, Type: Rolling Walker  2. Consult:Rehab team including PT, OT,  and . 3. Disposition Rehab-discussed with patient. 4. Plexipulse when in bed  5. Thigh-high or knee-high JORDAN's when out of bed  6. DVT Prophylaxis per promary  7. Incentive spirometer Q1H while awake  8. Post op hemorrhagic anemia-  monitor. 9. Activity: WBAT BLE  10. Planned Labs: CBC,BMP  11. Pain Control: stable, mild-to-moderate joint symptoms intermittently, reasonably well controlled by PRN meds        Follow up with Dr Veronica Harrington  2 weeks after discharge from rehab. Follow up with ORTHO per instructions. Thank you for the opportunity to participate in the care of this patient.   Signed By: Tosin Cervantes MD     September 11, 2017

## 2017-09-11 NOTE — PROGRESS NOTES
Patient is alert and oriented X3, sitting up in bedside chair, IV fluids infusing, no c/o pain or discomfort @ this time, denies dyspnea and SOB, call light within reach.

## 2017-09-11 NOTE — PROGRESS NOTES
Patient is calm and sitting in chair. She shared that she is looking forward to her rehab so she can get stronger. Encouraged.     Robbin Wright, staff Lito [address]le [address], [address] [facility]  /   [email or web address]

## 2017-09-11 NOTE — PROGRESS NOTES
Problem: Nutrition Deficit  Goal: *Optimize nutritional status  Nutrition  Reason for assessment: Referral received from nursing admission Malnutrition Screening Tool for recently lost 14-23# without trying and eating poorly due to decreased appetite. Assessment:   Diet order(s): regular  Food/Nutrition Patient History:  The patient reports that she has been eating \"too good\" since admitted. She states that she does not eat well at home because she is \"lazy\". Per patient, she can't afford ensure enlive. She states that she has had recent weight loss (reports a weight of 105 pound a couple of months ago.)  No record of this in EMR. Acknowledge h/o EtOH abuse and tobacco abuse, admitted s/p fall. Anthropometrics:Height: 5' 5\" (165.1 cm),  Weight: 54.7 kg (120 lb 11.2 oz), Weight Source: Standing scale (comment), Body mass index is 20.09 kg/(m^2). BMI class of normal weight. WT / BMI 9/11/2017 5/29/2017 12/2/2015 11/1/2015 8/13/2015   WEIGHT 120 lb 11.2 oz 110 lb 120 lb 120 lb 120 lb       WT / BMI 8/7/2015 7/8/2015   WEIGHT 120 lb 3.2 oz 122 lb 9.6 oz   No recent weight loss noted. Potential for a 10 pound weight gain throughout the past ~4 months. Macronutrient needs:  EER:  4179-3317 kcal /day (25-30 kcal/kg listed BW)  EPR:  57-68 grams protein/day (1-1.2 grams/kg IBW)  Intake/Comparative Standards:  Average intake for past 1 day(s)/3 recorded meal(s): 100%. This potentially meets ~100% of kcal and ~100% of protein needs     Nutrition Diagnosis: No nutrition diagnosis. Intervention:  Meals and snacks: Continue current diet. Nutrition Supplement Therapy: ensure enlive BID  Provided patient with menu.    Nutrition needs at discharge: Continue ensure enlive or comparable supplement TID     Dudley Lopez [address], RD, LD, [phone]

## 2017-09-11 NOTE — PROGRESS NOTES
Patient sitting up in chair in room, during night patient c/o pain to R side of back increased with movement, O2 via n/c in place throughout shift, patient fluids infusing, no needs voiced @ this time, call light within reach.

## 2017-09-11 NOTE — PROGRESS NOTES
Shift assessment complete. Pt resting in recliner. Complaints of right rib cage pain, but not time for medication. Will medicate as allowed. No other complaints. Safety measures in place. Call light in reach.

## 2017-09-11 NOTE — PROGRESS NOTES
Pt crying out in pain, states she is \"seeing stars\", reporting excruciating 10/10 pain in ribs. Given scheduled Norco 10, will monitor.

## 2017-09-12 LAB
MM INDURATION POC: NORMAL MM (ref 0–5)
PPD POC: NORMAL NEGATIVE

## 2017-09-12 PROCEDURE — 99232 SBSQ HOSP IP/OBS MODERATE 35: CPT | Performed by: INTERNAL MEDICINE

## 2017-09-12 PROCEDURE — 74011250637 HC RX REV CODE- 250/637: Performed by: INTERNAL MEDICINE

## 2017-09-12 PROCEDURE — 74011000250 HC RX REV CODE- 250: Performed by: NURSE PRACTITIONER

## 2017-09-12 PROCEDURE — 94640 AIRWAY INHALATION TREATMENT: CPT

## 2017-09-12 PROCEDURE — 77030027138 HC INCENT SPIROMETER -A

## 2017-09-12 PROCEDURE — 77010033678 HC OXYGEN DAILY

## 2017-09-12 PROCEDURE — 77030036554

## 2017-09-12 PROCEDURE — 65270000029 HC RM PRIVATE

## 2017-09-12 PROCEDURE — 74011250636 HC RX REV CODE- 250/636: Performed by: NURSE PRACTITIONER

## 2017-09-12 PROCEDURE — 97530 THERAPEUTIC ACTIVITIES: CPT

## 2017-09-12 PROCEDURE — 74011250637 HC RX REV CODE- 250/637: Performed by: NURSE PRACTITIONER

## 2017-09-12 PROCEDURE — 94760 N-INVAS EAR/PLS OXIMETRY 1: CPT

## 2017-09-12 RX ORDER — IBUPROFEN 600 MG/1
600 TABLET ORAL 3 TIMES DAILY
Qty: 90 TAB | Refills: 0 | Status: SHIPPED
Start: 2017-09-12 | End: 2017-09-14

## 2017-09-12 RX ORDER — BISACODYL 5 MG
5 TABLET, DELAYED RELEASE (ENTERIC COATED) ORAL
Qty: 30 TAB | Refills: 0 | Status: SHIPPED
Start: 2017-09-12 | End: 2017-09-14

## 2017-09-12 RX ORDER — OXYCODONE HYDROCHLORIDE 5 MG/1
10 TABLET ORAL
Status: DISCONTINUED | OUTPATIENT
Start: 2017-09-12 | End: 2017-09-13

## 2017-09-12 RX ORDER — PANTOPRAZOLE SODIUM 40 MG/1
40 TABLET, DELAYED RELEASE ORAL
Qty: 30 TAB | Refills: 0 | Status: SHIPPED | OUTPATIENT
Start: 2017-09-12 | End: 2017-09-14

## 2017-09-12 RX ORDER — DOCUSATE SODIUM 100 MG/1
100 CAPSULE, LIQUID FILLED ORAL 2 TIMES DAILY
Qty: 60 CAP | Refills: 2 | Status: SHIPPED
Start: 2017-09-12 | End: 2017-09-14

## 2017-09-12 RX ORDER — IBUPROFEN 600 MG/1
600 TABLET ORAL 3 TIMES DAILY
Status: DISCONTINUED | OUTPATIENT
Start: 2017-09-12 | End: 2017-09-14 | Stop reason: HOSPADM

## 2017-09-12 RX ORDER — HYDROCODONE BITARTRATE AND ACETAMINOPHEN 10; 325 MG/1; MG/1
1 TABLET ORAL EVERY 6 HOURS
Qty: 15 TAB | Refills: 0 | Status: SHIPPED | OUTPATIENT
Start: 2017-09-12 | End: 2017-09-14

## 2017-09-12 RX ORDER — OXYCODONE HYDROCHLORIDE 10 MG/1
10 TABLET ORAL
Qty: 15 TAB | Refills: 0 | Status: SHIPPED | OUTPATIENT
Start: 2017-09-12 | End: 2017-09-14

## 2017-09-12 RX ADMIN — Medication 5 ML: at 06:09

## 2017-09-12 RX ADMIN — ALBUTEROL SULFATE 2.5 MG: 2.5 SOLUTION RESPIRATORY (INHALATION) at 20:48

## 2017-09-12 RX ADMIN — HYDROCODONE BITARTRATE AND ACETAMINOPHEN 1 TABLET: 10; 325 TABLET ORAL at 08:04

## 2017-09-12 RX ADMIN — HYDROCODONE BITARTRATE AND ACETAMINOPHEN 1 TABLET: 10; 325 TABLET ORAL at 20:18

## 2017-09-12 RX ADMIN — OXYCODONE HYDROCHLORIDE 10 MG: 5 TABLET ORAL at 12:15

## 2017-09-12 RX ADMIN — HYDROCODONE BITARTRATE AND ACETAMINOPHEN 1 TABLET: 10; 325 TABLET ORAL at 14:08

## 2017-09-12 RX ADMIN — IBUPROFEN 600 MG: 600 TABLET, FILM COATED ORAL at 21:04

## 2017-09-12 RX ADMIN — OXYCODONE HYDROCHLORIDE 10 MG: 5 TABLET ORAL at 18:24

## 2017-09-12 RX ADMIN — ALBUTEROL SULFATE 2.5 MG: 2.5 SOLUTION RESPIRATORY (INHALATION) at 07:53

## 2017-09-12 RX ADMIN — DOCUSATE SODIUM 100 MG: 100 CAPSULE, LIQUID FILLED ORAL at 08:04

## 2017-09-12 RX ADMIN — ENOXAPARIN SODIUM 40 MG: 40 INJECTION SUBCUTANEOUS at 16:44

## 2017-09-12 RX ADMIN — MORPHINE SULFATE 5 MG: 10 INJECTION INTRAMUSCULAR; INTRAVENOUS; SUBCUTANEOUS at 10:19

## 2017-09-12 RX ADMIN — MORPHINE SULFATE 5 MG: 10 INJECTION INTRAMUSCULAR; INTRAVENOUS; SUBCUTANEOUS at 06:21

## 2017-09-12 RX ADMIN — HYDROCODONE BITARTRATE AND ACETAMINOPHEN 1 TABLET: 10; 325 TABLET ORAL at 02:13

## 2017-09-12 RX ADMIN — PANTOPRAZOLE SODIUM 40 MG: 40 TABLET, DELAYED RELEASE ORAL at 06:09

## 2017-09-12 RX ADMIN — DOCUSATE SODIUM 100 MG: 100 CAPSULE, LIQUID FILLED ORAL at 16:43

## 2017-09-12 RX ADMIN — IBUPROFEN 600 MG: 600 TABLET, FILM COATED ORAL at 16:44

## 2017-09-12 RX ADMIN — ALBUTEROL SULFATE 2.5 MG: 2.5 SOLUTION RESPIRATORY (INHALATION) at 11:59

## 2017-09-12 RX ADMIN — Medication 10 ML: at 16:48

## 2017-09-12 RX ADMIN — ALBUTEROL SULFATE 2.5 MG: 2.5 SOLUTION RESPIRATORY (INHALATION) at 16:45

## 2017-09-12 NOTE — PROGRESS NOTES
Patient accepted for short-term rehab at [address] [place] [address] prior authorization started. Case Management will continue to follow.     Care Management Interventions  Transition of Care Consult (CM Consult): Discharge Planning  Discharge Durable Medical Equipment: No  Physical Therapy Consult: Yes  Occupational Therapy Consult: Yes  Speech Therapy Consult: No  Current Support Network: Own Home, Lives Alone  Confirm Follow Up Transport: Self  Plan discussed with Pt/Family/Caregiver: Yes  Freedom of Choice Offered: Yes  Discharge Location  Discharge Placement: Other: (To be determined.)

## 2017-09-12 NOTE — PROGRESS NOTES
Problem: Mobility Impaired (Adult and Pediatric)  Goal: *Acute Goals and Plan of Care (Insert Text)  1. Ms. Adryan Sanderson will perform supine to sit and sit to supine independently in 3 days. 2. Ms. Adryan Sanderson will perform sit to stand and bed to chair independently in 3 days. 3. Ms. Adryan Sanderson will perform gait with cane independently 400 ft in 3 days. PHYSICAL THERAPY: Daily Note, Treatment Day: 1st and AM 9/12/2017  INPATIENT: Hospital Day: 4  Payor: [facility] MEDICARE / Plan: [facility] MEDICARE / Product Type: Managed Care Medicare /      NAME/AGE/GENDER: Catherine Pacheco is a 65 y.o. female             PRIMARY DIAGNOSIS: Rib fractures  Fracture of ribs, multiple, closed  Pneumothorax Fracture of multiple ribs Fracture of multiple ribs        ICD-10: Treatment Diagnosis:       · Other abnormalities of gait and mobility (R26.89)  · History of falling (Z91.81)   Precaution/Allergies:  Review of patient's allergies indicates no known allergies. ASSESSMENT:      Ms. Adryan Sanderson presents in pain after suffering rib fractures from a fall in the bathtub. She is happy to walk after getting her morphine. She increased her gait distance but after felt she may have over done it. Progress made. This section established at most recent assessment   PROBLEM LIST (Impairments causing functional limitations):  1. Decreased Transfer Abilities  2. Decreased Ambulation Ability/Technique  3. Increased Pain  4. Decreased Activity Tolerance    INTERVENTIONS PLANNED: (Benefits and precautions of physical therapy have been discussed with the patient.)  1. Bed Mobility  2. Gait Training  3. Therapeutic Activites  4.  Transfer Training      TREATMENT PLAN: Frequency/Duration: 3 times a week for duration of hospital stay  Rehabilitation Potential For Stated Goals: GOOD      RECOMMENDED REHABILITATION/EQUIPMENT: (at time of discharge pending progress): Due to the probability of continued deficits (see above) this patient will likely need continued skilled physical therapy after discharge. Equipment:   · None at this time                   HISTORY:   History of Present Injury/Illness (Reason for Referral):  Patient is a 65 y.o.  female presents s/p fall with L flank pain. Patient has a history of ETOH abuse (reports her last drink was 6 years ago) and tobacco but is quitting and is down to 2 cigarettes / day. She was admitted 11/2015 at [facility] for ETOH abuse and [facility] lists anxiety with suicide attempts x 4 with xanax overdose and admission to [facility] x 4. She was most recently seen at [facility] 6/2017 and was admitted to [facility] for ETOH abuse. Patient presents to the ER after falling in the bathroom. She recalls hitting her head but denies LOC. She states that she landed on her R side and has multiple abrasions. She presented to the ER for further evaluation. CT head without acute abnormality. CT chest with multiple rib fractures (9-11), small R pleural fluid collection, and small anterior R PTX. She received medication for pain but continues to c/o significant discomfort. She will be admitted for pain control. Past Medical History/Comorbidities:   Ms. Marly Collazo  has a past medical history of Alcohol abuse (10/31/2015); Alcohol withdrawal ([address] (10/31/2015); Asthma; Other ill-defined conditions; Other ill-defined conditions; Psychiatric disorder; and Seizures ([address] She also has no past medical history of Arthritis; Autoimmune disease ([address] CAD (coronary artery disease); Cancer ([address] Chronic kidney disease; COPD; DEMENTIA; Diabetes ([address] Endocrine disease; Heart failure ([address] Hypertension; Infectious disease; Liver disease; PUD (peptic ulcer disease); Sleep disorder; Stroke [facility]); or Thromboembolus ([address] Ms. Marly Collazo  has a past surgical history that includes gyn; orthopaedic; and heent.   Social History/Living Environment:   Home Environment: Apartment  # Steps to Enter: 0  One/Two Story Residence: One story  # of Interior Steps:  (0)  Height of Each Step (in):  (0)  Interior Rails: None  Lift Chair Available: No  Living Alone: Yes  Support Systems: Friends \ neighbors  Patient Expects to be Discharged to[de-identified] Apartment  Current DME Used/Available at Home: Cane, straight, Grab bars  Tub or Shower Type: Tub/Shower combination  Prior Level of Function/Work/Activity:  independent  ETOH history, psych disorder   Number of Personal Factors/Comorbidities that affect the Plan of Care: 1-2: MODERATE COMPLEXITY   EXAMINATION:   Most Recent Physical Functioning:   Gross Assessment:                  Posture:     Balance:    Bed Mobility:  Supine to Sit: Minimum assistance (with legs)  Wheelchair Mobility:     Transfers:  Sit to Stand: Stand-by asssistance  Stand to Sit: Independent  Gait:     Distance (ft): 200 Feet (ft)  Assistive Device: Cane, straight  Ambulation - Level of Assistance: Stand-by asssistance       Body Structures Involved:  1. Muscles Body Functions Affected:  1. Movement Related Activities and Participation Affected:  1. Mobility   Number of elements that affect the Plan of Care: 3: MODERATE COMPLEXITY   CLINICAL PRESENTATION:   Presentation: Evolving clinical presentation with changing clinical characteristics: MODERATE COMPLEXITY   CLINICAL DECISION MAKIN [facility] Mobility Inpatient Short Form  How much difficulty does the patient currently have. .. Unable A Lot A Little None   1. Turning over in bed (including adjusting bedclothes, sheets and blankets)? [ ] 1   [X] 2   [ ] 3   [ ] 4   2. Sitting down on and standing up from a chair with arms ( e.g., wheelchair, bedside commode, etc.)   [ ] 1   [ ] 2   [X] 3   [ ] 4   3. Moving from lying on back to sitting on the side of the bed? [ ] 1   [X] 2   [ ] 3   [ ] 4   How much help from another person does the patient currently need. .. Total A Lot A Little None   4.   Moving to and from a bed to a chair (including a wheelchair)? [ ] 1   [ ] 2   [X] 3   [ ] 4   5. Need to walk in hospital room? [ ] 1   [ ] 2   [X] 3   [ ] 4   6. Climbing 3-5 steps with a railing? [ ] 1   [ ] 2   [X] 3   [ ] 4   © 2007, Trustees of [address] [place] [address] [address] [id number], under license to [employer]. All rights reserved    Score:  Initial: 16 Most Recent: X (Date: -- )     Interpretation of Tool:  Represents activities that are increasingly more difficult (i.e. Bed mobility, Transfers, Gait). Score 24 23 22-20 19-15 14-10 9-7 6       Modifier CH CI CJ CK CL CM CN         · Mobility - Walking and Moving Around:               - CURRENT STATUS:    CK - 40%-59% impaired, limited or restricted               - GOAL STATUS:           CK - 40%-59% impaired, limited or restricted               - D/C STATUS:                       ---------------To be determined---------------  Payor: [employer] [place] MEDICARE / Plan: [place] [employer] [facility] [place] / Product Type: Managed Care Medicare /       Medical Necessity:     · Patient is expected to demonstrate progress in functional technique to increase independence with mobility and gait. .  Reason for Services/Other Comments:  · Patient continues to require present interventions due to patient's inability to function at baseline. .   Use of outcome tool(s) and clinical judgement create a POC that gives a: Questionable prediction of patient's progress: MODERATE COMPLEXITY                 TREATMENT:   (In addition to Assessment/Re-Assessment sessions the following treatments were rendered)   Pre-treatment Symptoms/Complaints:  Pain, anxiety. Pain: Initial:   Pain Intensity 1: 7  Pain Location 1: Rib cage  Pain Intervention(s) 1:  (RN gave morphine)  Post Session:  More settled and felt better up in the chair. Therapeutic Activity: (    25 minutes): Therapeutic activities including Bed transfers and Ambulation on level ground to improve mobility, strength and balance.   Required minimal   to promote dynamic balance in standing with use of her cane. Braces/Orthotics/Lines/Etc:   · O2 Device: Room air  Treatment/Session Assessment:    · Response to Treatment:  good  · Interdisciplinary Collaboration:  · Physical Therapy Assistant and Registered Nurse  · After treatment position/precautions:  · Supine in bed, Bed/Chair-wheels locked, Bed in low position, Call light within reach and RN notified  · Compliance with Program/Exercises: compliant all of the time. · Recommendations/Intent for next treatment session: \"Next visit will focus on advancements to more challenging activities and reduction in assistance provided\".   Total Treatment Duration:PT Patient Time In/Time Out  Time In: 1010  Time Out: [address] [place] [address], [facility]

## 2017-09-12 NOTE — PROGRESS NOTES
Angela Canales  Admission Date: 9/9/2017             Daily Progress Note: 9/12/2017    The patient's chart is reviewed and the patient is discussed with the staff. Angela Canales is a 61yoF who was admitted after a fall with rib fractures, atelectasis, small pneumothorax. Her course has been complicated by pain control issues.     Subjective:     Complains that her pain is still not well controlled  Doesn't have IS at bedside  Has bed at [facility] but her insurance has not yet approved ([employer])    Current Facility-Administered Medications   Medication Dose Route Frequency    ibuprofen (MOTRIN) tablet 600 mg  600 mg Oral TID    oxyCODONE IR (ROXICODONE) tablet 10 mg  10 mg Oral Q6H PRN    docusate sodium (COLACE) capsule 100 mg  100 mg Oral BID    HYDROcodone-acetaminophen (NORCO)  mg tablet 1 Tab  1 Tab Oral Q6H    LORazepam (ATIVAN) tablet 1 mg  1 mg Oral Q6H PRN    sodium chloride (NS) flush 5-10 mL  5-10 mL IntraVENous Q8H    sodium chloride (NS) flush 5-10 mL  5-10 mL IntraVENous PRN    albuterol (PROVENTIL VENTOLIN) nebulizer solution 2.5 mg  2.5 mg Nebulization QID RT    ondansetron (ZOFRAN) injection 4 mg  4 mg IntraVENous Q4H PRN    bisacodyl (DULCOLAX) tablet 5 mg  5 mg Oral DAILY PRN    enoxaparin (LOVENOX) injection 40 mg  40 mg SubCUTAneous Q24H    pantoprazole (PROTONIX) tablet 40 mg  40 mg Oral ACB    influenza vaccine 2015-16 (36mos+)(PF) (FLUZONE/FLUARIX QUAD) injection 0.5 mL  0.5 mL IntraMUSCular PRIOR TO DISCHARGE       Review of Systems  Constitutional: negative for fever, chills, sweats  Cardiovascular: negative for palpitations, syncope, edema  Gastrointestinal:  negative for dysphagia, reflux, vomiting, diarrhea, abdominal pain, or melena  Neurologic:  negative for focal weakness, numbness, headache    Objective:     Vitals:    09/12/17 0746 09/12/17 0753 09/12/17 1124 09/12/17 1205   BP: 111/75  108/67    Pulse: 67  77    Resp: 16  18    Temp: 98.4 °F (36.9 °C)  98.5 °F (36.9 °C)    SpO2: 100% 100% 97% 98%   Weight:       Height:         Intake and Output:   09/10 1901 - 09/12 0700  In: 2160 [P.O.:1060; I.V.:1100]  Out: -        Physical Exam:   Constitution:  the patient is well developed and in no acute distress  EENMT:  Sclera clear, pupils equal, oral mucosa moist  Respiratory: decreased bilaterally  Cardiovascular:  RRR without M,G,R  Gastrointestinal: soft and non-tender; with positive bowel sounds. Musculoskeletal: warm without cyanosis. There is no lower leg edema. Skin:  no jaundice or rashes  Neurologic: no gross neuro deficits     Psychiatric:  alert and oriented x 3    CXR:   Last cxr reviewed with no ptx    LAB  No results for input(s): GLUCPOC in the last 72 hours. No lab exists for component: GLPOC   No results for input(s): WBC, HGB, HCT, PLT, INR, HGBEXT, HCTEXT, PLTEXT in the last 72 hours. No lab exists for component: INREXT  No results for input(s): NA, K, CL, CO2, GLU, BUN, CREA, MG, CA, PHOS, TROIQ, ALB, TBIL, TBILI, GPT, ALT, SGOT, BNPP in the last 72 hours. No lab exists for component: TROIP  No results for input(s): PH, PCO2, PO2, HCO3 in the last 72 hours. No results for input(s): LCAD, LAC in the last 72 hours. Assessment:  (Medical Decision Making)     Hospital Problems  Date Reviewed: 9/12/2017          Codes Class Noted POA    Tobacco abuse (Chronic) ICD-10-CM: Z72.0  ICD-9-CM: 305.1  9/9/2017 Yes    Smoking cessation education now    * (Principal)Fracture of multiple ribs ICD-10-CM: S22.49XA  ICD-9-CM: 807.09  9/9/2017 Yes    Overview Signed 9/9/2017  3:16 PM by Judson Juárez NP     Ribs 9-11             Fall ICD-10-CM: W19. Agustina Mason  ICD-9-CM: E888.9  9/9/2017 Yes        Pneumothorax ICD-10-CM: J93.9  ICD-9-CM: 512.89  9/9/2017 Yes    Resolved, no chest tube indicated    H/O ETOH abuse (Chronic) ICD-10-CM: Y11.245  ICD-9-CM: 305.03  9/9/2017 Yes        Rib fractures ICD-10-CM: G07.53GX  ICD-9-CM: 807.00  9/9/2017 Unknown    On     Fracture of ribs, multiple, closed ICD-10-CM: S22.49XA  ICD-9-CM: 807.09  9/9/2017 Unknown            Chest wall pain:  On norco q6, prn oxycodone, now scheduled ibuprofen. Plan:  (Medical Decision Making)     --Rib binder, IS, OOB  --Scheduled norco and ibuprofen  --prn oxycodone in addition  --bowel regimen  --discharge to [facility] pending insurance approval    More than 50% of the time documented was spent in face-to-face contact with the patient and in the care of the patient on the floor/unit where the patient is located.     Peterson Heninng MD

## 2017-09-12 NOTE — PROGRESS NOTES
Mrs. Sellers Jobs resting in bed eating breakfast tray; good appetite. Alert, oriented in all spheres. On 3 lpm NC with diminished right lung sounds. Ambulating to bathroom without assistance. States her right lower rib cage is \"very sore\" and \"hard to take a deep breath. \" Without needs at this time. Call light is in lap and door open. Will monitor closely.

## 2017-09-12 NOTE — PROGRESS NOTES
Pt resting quietly in bed on RA, requesting Ovid at 8am. No s/s of acute distress noted. Report given to oncoming RN.

## 2017-09-12 NOTE — PROGRESS NOTES
Mrs. Alvarenga Age in bed resting quietly. Uneventful day. ambulates in room freely. Rib binder placed per MD order. Ready to be discharged to rehab tomorrow. Without needs at this time. Call light in lap and door open.

## 2017-09-12 NOTE — PROGRESS NOTES
Pt c/o persistent \"major pain\", 10/10 to rib cage, requesting Morphine. Given Morphine 5mg IVP per pt request. Will monitor.

## 2017-09-12 NOTE — DISCHARGE SUMMARY
DISCHARGE NOTE    [address]  Admission date:  9/9/2017  Discharge date:  9/12/2017    Admitting Diagnosis:  Rib fractures  Fracture of ribs, multiple, closed  Pneumothorax    Discharge Diagnoses:    Hospital Problems  Date Reviewed: 9/12/2017          Codes Class Noted POA    Tobacco abuse (Chronic) ICD-10-CM: Z72.0  ICD-9-CM: 305.1  9/9/2017 Yes        * (Principal)Fracture of multiple ribs ICD-10-CM: S22.49XA  ICD-9-CM: 807.09  9/9/2017 Yes    Overview Signed 9/9/2017  3:16 PM by Harpreet Pink NP     Ribs 9-11             Fall ICD-10-CM: W19. Mark Arcos  ICD-9-CM: E888.9  9/9/2017 Yes        Pneumothorax ICD-10-CM: J93.9  ICD-9-CM: 512.89  9/9/2017 Yes        H/O ETOH abuse (Chronic) ICD-10-CM: X59.377  ICD-9-CM: 305.03  9/9/2017 Yes        Rib fractures ICD-10-CM: G21.21ZT  ICD-9-CM: 807.00  9/9/2017 Unknown        Fracture of ribs, multiple, closed ICD-10-CM: S22.49XA  ICD-9-CM: 807.09  9/9/2017 Unknown              Consultants:none    Studies/Procedures:  CXR:   portable AP radiograph of the chest was obtained at 0554 hours. The  patient is on a cardiac monitor. Minimal bibasilar atelectasis and small  bilateral pleural effusions improved. The cardiac and mediastinal contours and  pulmonary vascularity are normal.  The bones and soft tissues are grossly within  normal limits. Chest CT: 9/10/17:  Fractures of the right ninth through 11th ribs with small right pleural fluid collection and small anterior right-sided pneumothorax. Condition on Discharge:  stable    Disposition:  Discharge to [facility] course:  Patient is a 65 y.o.  female presents s/p fall with L flank pain. Patient has a history of ETOH abuse (reports her last drink was 6 years ago) and tobacco but is quitting and is down to 2 cigarettes / day.   She was admitted 11/2015 at [address] [place] [address] for ETOH abuse and Care Everywhere lists anxiety with suicide attempts x 4 with xanax overdose and admission to [address] [place] [address] x 4. She was most recently seen at [facility] 6/2017 and was admitted to [address] [place] [address] for ETOH abuse.       Patient presents to the ER after falling in the bathroom. She recalls hitting her head but denies LOC. She states that she landed on her R side and has multiple abrasions. She presented to the ER for further evaluation. CT head without acute abnormality. CT chest with multiple rib fractures (9-11), small R pleural fluid collection, and small anterior R PTX. She received medication for pain but continues to c/o significant discomfort. She was admitted for pain control. Her CXR improved. She remained weak and has been accepted to [facility] with pain management. She will need to continue Incentive Spirometry, rib binding, anti-inflammatories, and PT. Physical Exam:   Constitution:  the patient is well developed and in no acute distress, on 3L  EENMT:  Sclera clear, pupils equal, oral mucosa moist  Respiratory: fairly clear  Cardiovascular:  RRR without M,G,R  Gastrointestinal: soft and non-tender; with positive bowel sounds. Musculoskeletal: warm without cyanosis. There is no lower leg edema. Skin:  no jaundice or rashes,   Neurologic: no gross neuro deficits     Psychiatric:  alert and oriented x 3s      LAB  Recent Labs      09/09/17   1207   WBC  9.8   HGB  14.0   HCT  41.2   PLT  327     Recent Labs      09/09/17   1207   NA  134*   K  4.0   CL  97*   CO2  28   BUN  9   CREA  0.57*   CA  8.9   ALB  3.5   TBILI  0.8   ALT  12   SGOT  29     No results for input(s): PH, PCO2, PO2, HCO3 in the last 72 hours. Discharge Medications:   Current Discharge Medication List      START taking these medications    Details   bisacodyl (DULCOLAX) 5 mg EC tablet Take 1 Tab by mouth daily as needed for Constipation. Qty: 30 Tab, Refills: 0      docusate sodium (COLACE) 100 mg capsule Take 1 Cap by mouth two (2) times a day for 90 days.   Qty: 60 Cap, Refills: 2      HYDROcodone-acetaminophen (NORCO)  mg tablet Take 1 Tab by mouth every six (6) hours. Max Daily Amount: 4 Tabs. Qty: 15 Tab, Refills: 0      ibuprofen (MOTRIN) 600 mg tablet Take 1 Tab by mouth three (3) times daily. Qty: 90 Tab, Refills: 0      oxyCODONE IR (ROXICODONE) 10 mg tab immediate release tablet Take 1 Tab by mouth every eight (8) hours as needed. Max Daily Amount: 30 mg.  Qty: 15 Tab, Refills: 0      pantoprazole (PROTONIX) 40 mg tablet Take 1 Tab by mouth Daily (before breakfast). Indications: while taking high dose ibuprofen for rib fractures  Qty: 30 Tab, Refills: 0         CONTINUE these medications which have NOT CHANGED    Details   LORazepam (ATIVAN) 1 mg tablet Take 1 Tab by mouth every six (6) hours as needed for Anxiety. Max Daily Amount: 4 mg. Qty: 20 Tab, Refills: 0      gabapentin (NEURONTIN) 400 mg capsule Take 400 mg by mouth three (3) times daily. Condition on Discharge:  stable      Followup/Outpt Studies:  --Follow up appointment with PCP in 1-2 weeks   --Total discharge greater than 30 minutes in duration. More than 50% of the time documented was spent in face-to-face contact with the patient and in the care of the patient on the floor/unit where the patient is located. PAMELA Humphries     I have spoken with and examined the patient. I agree with the above assessment and plan as documented. Mrs. Carlos Nino has ongoing rib pain but her pneumothorax has resolved. Gen: pleasant, limited motion due to pain  Lungs: CTAB  Heart:  RRR with no Murmur/Rubs/Gallops  Abd: NABS  Ext: no edema    --Continue pain control regimen which is currently effective:  Norco 10mg/325mg q6h scheduled, Ibuprofen 600mg tid scheduled, as needed oxycodone 10mg every 8h.  --Needs continued bowel regimen]  --Incentive spirometry hourly, OOB, STR. Need to really encourage mobility.   --Smoking cessation advised    Celsa Alfonso MD

## 2017-09-13 PROCEDURE — 74011250637 HC RX REV CODE- 250/637: Performed by: INTERNAL MEDICINE

## 2017-09-13 PROCEDURE — 94640 AIRWAY INHALATION TREATMENT: CPT

## 2017-09-13 PROCEDURE — 74011250636 HC RX REV CODE- 250/636: Performed by: NURSE PRACTITIONER

## 2017-09-13 PROCEDURE — 65270000029 HC RM PRIVATE

## 2017-09-13 PROCEDURE — 74011250637 HC RX REV CODE- 250/637: Performed by: NURSE PRACTITIONER

## 2017-09-13 PROCEDURE — 99232 SBSQ HOSP IP/OBS MODERATE 35: CPT | Performed by: INTERNAL MEDICINE

## 2017-09-13 PROCEDURE — 76450000000

## 2017-09-13 PROCEDURE — 74011000250 HC RX REV CODE- 250: Performed by: NURSE PRACTITIONER

## 2017-09-13 PROCEDURE — 97530 THERAPEUTIC ACTIVITIES: CPT

## 2017-09-13 RX ORDER — AMOXICILLIN 250 MG
1 CAPSULE ORAL 2 TIMES DAILY
Status: DISCONTINUED | OUTPATIENT
Start: 2017-09-13 | End: 2017-09-14 | Stop reason: HOSPADM

## 2017-09-13 RX ORDER — NALOXONE HYDROCHLORIDE 0.4 MG/ML
0.04 INJECTION, SOLUTION INTRAMUSCULAR; INTRAVENOUS; SUBCUTANEOUS AS NEEDED
Status: DISCONTINUED | OUTPATIENT
Start: 2017-09-13 | End: 2017-09-14 | Stop reason: HOSPADM

## 2017-09-13 RX ORDER — FENTANYL 50 UG/1
1 PATCH TRANSDERMAL
Status: DISCONTINUED | OUTPATIENT
Start: 2017-09-13 | End: 2017-09-14 | Stop reason: HOSPADM

## 2017-09-13 RX ORDER — OXYCODONE HYDROCHLORIDE 5 MG/1
10 TABLET ORAL
Status: DISCONTINUED | OUTPATIENT
Start: 2017-09-13 | End: 2017-09-14 | Stop reason: HOSPADM

## 2017-09-13 RX ADMIN — Medication 5 ML: at 14:00

## 2017-09-13 RX ADMIN — ALBUTEROL SULFATE 2.5 MG: 2.5 SOLUTION RESPIRATORY (INHALATION) at 20:31

## 2017-09-13 RX ADMIN — ALBUTEROL SULFATE 2.5 MG: 2.5 SOLUTION RESPIRATORY (INHALATION) at 15:50

## 2017-09-13 RX ADMIN — ENOXAPARIN SODIUM 40 MG: 40 INJECTION SUBCUTANEOUS at 17:35

## 2017-09-13 RX ADMIN — Medication 5 ML: at 21:56

## 2017-09-13 RX ADMIN — STANDARDIZED SENNA CONCENTRATE AND DOCUSATE SODIUM 1 TABLET: 8.6; 5 TABLET, FILM COATED ORAL at 17:35

## 2017-09-13 RX ADMIN — DOCUSATE SODIUM 100 MG: 100 CAPSULE, LIQUID FILLED ORAL at 08:25

## 2017-09-13 RX ADMIN — IBUPROFEN 600 MG: 600 TABLET, FILM COATED ORAL at 08:25

## 2017-09-13 RX ADMIN — ALBUTEROL SULFATE 2.5 MG: 2.5 SOLUTION RESPIRATORY (INHALATION) at 07:10

## 2017-09-13 RX ADMIN — HYDROCODONE BITARTRATE AND ACETAMINOPHEN 1 TABLET: 10; 325 TABLET ORAL at 08:25

## 2017-09-13 RX ADMIN — OXYCODONE HYDROCHLORIDE 10 MG: 5 TABLET ORAL at 05:56

## 2017-09-13 RX ADMIN — OXYCODONE HYDROCHLORIDE 10 MG: 5 TABLET ORAL at 00:25

## 2017-09-13 RX ADMIN — Medication 5 ML: at 00:26

## 2017-09-13 RX ADMIN — IBUPROFEN 600 MG: 600 TABLET, FILM COATED ORAL at 21:55

## 2017-09-13 RX ADMIN — Medication 5 ML: at 05:56

## 2017-09-13 RX ADMIN — OXYCODONE HYDROCHLORIDE 10 MG: 5 TABLET ORAL at 20:37

## 2017-09-13 RX ADMIN — OXYCODONE HYDROCHLORIDE 10 MG: 5 TABLET ORAL at 16:27

## 2017-09-13 RX ADMIN — IBUPROFEN 600 MG: 600 TABLET, FILM COATED ORAL at 16:27

## 2017-09-13 RX ADMIN — HYDROCODONE BITARTRATE AND ACETAMINOPHEN 1 TABLET: 10; 325 TABLET ORAL at 02:20

## 2017-09-13 RX ADMIN — ALBUTEROL SULFATE 2.5 MG: 2.5 SOLUTION RESPIRATORY (INHALATION) at 11:25

## 2017-09-13 RX ADMIN — OXYCODONE HYDROCHLORIDE 10 MG: 5 TABLET ORAL at 12:27

## 2017-09-13 RX ADMIN — PANTOPRAZOLE SODIUM 40 MG: 40 TABLET, DELAYED RELEASE ORAL at 05:56

## 2017-09-13 NOTE — CONSULTS
Palliative Care    Patient: Jaydon Alarcon MRN: [id number]  SSN: [id number]    YOB: 1955  Age: 65 y.o. Sex: female       Date of Request: 9/13/2017  Date of Consult:  9/13/2017  Reason for Consult:  pain and symptom management  Requesting Physician: Dr. Yao Swanson     Assessment/Plan:     Principal Diagnosis:     Pain, bone  M89.9  Additional Diagnoses:   · Constipation, Unspecified  K59.00  · Debility, Unspecified  R53.81  · Counseling, Encounter for Medical Advice  Z71.9  · Encounter for Palliative Care  Z51.5    Palliative Performance Scale (PPS):  PPS: 79    Medical Decision Making:   Reviewed and summarized chart from admission to present. Discussed case with appropriate providers: Dr. Iantha Siemens laboratory and x-ray data: CBC, CMP, CXR    Patient sitting in recliner, no visitors present. Patient with persistent pain to broken right rib. Educated and counseled about onset of Fentanyl patch. For breakthrough pain, she states oxycodone is working better than Norco.  Discontinued Norco and provided oxycodone every 4 hours prn. Agree with ibuprofen. Counseled about acute pain, and the hope and need to de-escalate opioids as quick and safely as possible. She verbalized agreement. Will continue to follow. Will discuss findings with members of the interdisciplinary team.      Thank you for this referral.   The Palliative Care team is available from 8 am to 4:30 pm Monday-Friday. Medical management during other hours is per the primary attending service. .    Subjective:     History obtained from:  Patient, Care Provider and Chart    Chief Complaint: Right rib pain  History of Present Illness:  Ms. Hill Woods is a 66 yo female with PMH of scoliosis/back pain, asthma, and other history as listed below. Patient had a fall at home getting out of the bathtub. She presented to [facility] ER on 9/9 with lower back pain.   CT chest showed multiple rib fractures, right pleural effusion, and small right pneumothorax. She was admitted for further management. She has had ongoing issues with pain management. Advance Directive: No       Code Status:  Full Code            Health Care Power of : No - Patient does not have a [address] Past Medical History:   Diagnosis Date    Alcohol abuse 10/31/2015    Alcohol withdrawal ([facility] 10/31/2015    Asthma     Other ill-defined conditions     lumbar and thoracic scoliosis    Other ill-defined conditions     irregular heart rate    Psychiatric disorder     ADHD    Seizures ([facility] [address]       Past Surgical History:   Procedure Laterality Date    HX GYN      hysterectomy    HX HEENT      sinus surgery. deviated septum.  HX ORTHOPAEDIC      right knee     History reviewed. No pertinent family history. Social History   Substance Use Topics    Smoking status: Current Every Day Smoker     Packs/day: 0.25    Smokeless tobacco: Never Used    Alcohol use 0.0 oz/week     0 Standard drinks or equivalent per week     Prior to Admission medications    Medication Sig Start Date End Date Taking? Authorizing Provider   bisacodyl (DULCOLAX) 5 mg EC tablet Take 1 Tab by mouth daily as needed for Constipation. 9/12/17  Yes Jeannine Claros MD   docusate sodium (COLACE) 100 mg capsule Take 1 Cap by mouth two (2) times a day for 90 days. 9/12/17 12/11/17 Yes Jeannine Claros MD   HYDROcodone-acetaminophen [facility])  mg tablet Take 1 Tab by mouth every six (6) hours. Max Daily Amount: 4 Tabs. 9/12/17  Yes Jeannine Claros MD   ibuprofen (MOTRIN) 600 mg tablet Take 1 Tab by mouth three (3) times daily. 9/12/17  Yes Jeannine Claros MD   oxyCODONE IR (ROXICODONE) 10 mg tab immediate release tablet Take 1 Tab by mouth every eight (8) hours as needed. Max Daily Amount: 30 mg. 9/12/17  Yes Jeannine Claros MD   pantoprazole (PROTONIX) 40 mg tablet Take 1 Tab by mouth Daily (before breakfast).  Indications: while taking high dose ibuprofen for rib fractures 9/12/17 Yes Sanam Chaparro MD   LORazepam (ATIVAN) 1 mg tablet Take 1 Tab by mouth every six (6) hours as needed for Anxiety. Max Daily Amount: 4 mg. 5/29/17  Yes Cinthya Cross MD   gabapentin (NEURONTIN) 400 mg capsule Take 400 mg by mouth three (3) times daily. Yes Katarzyna Alexander MD       No Known Allergies     Review of Systems:  A comprehensive review of systems was negative except for:   Musculoskeletal: Positive for right rib pain. Objective:     Visit Vitals    /72    Pulse 96    Temp 98.2 °F (36.8 °C)    Resp 18    Ht 5' 5\" (1.651 m)    Wt 54.7 kg (120 lb 8 oz)    SpO2 99%    Breastfeeding No    BMI 20.05 kg/m2        Physical Exam:    General:  Cooperative. No acute distress. Eyes:  Conjunctivae/corneas clear    Nose: Nares normal. Septum midline. Neck: Supple, symmetrical, trachea midline. Lungs:   Clear to auscultation bilaterally, unlabored. Heart:  Regular rate and rhythm. Abdomen:   Soft, non-tender, non-distended. Extremities: Normal, atraumatic, no cyanosis or edema. Skin: Large area of bruising to right side over rib fractures. Neurologic: Nonfocal.   Psych: Alert and oriented. Assessment:     Hospital Problems  Date Reviewed: 9/12/2017          Codes Class Noted POA    Tobacco abuse (Chronic) ICD-10-CM: Z72.0  ICD-9-CM: 305.1  9/9/2017 Yes        * (Principal)Fracture of multiple ribs ICD-10-CM: S22.49XA  ICD-9-CM: 807.09  9/9/2017 Yes    Overview Signed 9/9/2017  3:16 PM by Gregoria Daugherty NP     Ribs 9-11             Fall ICD-10-CM: W19. Marianna Galea  ICD-9-CM: E888.9  9/9/2017 Yes        Pneumothorax ICD-10-CM: J93.9  ICD-9-CM: 512.89  9/9/2017 Yes        H/O ETOH abuse (Chronic) ICD-10-CM: Q51.277  ICD-9-CM: 305.03  9/9/2017 Yes        Rib fractures ICD-10-CM: S22.39XA  ICD-9-CM: 807.00  9/9/2017 Unknown        Fracture of ribs, multiple, closed ICD-10-CM: S22.49XA  ICD-9-CM: 807.09  9/9/2017 Unknown              Signed By: Jayne Simmonds, NP     September 13, 2017

## 2017-09-13 NOTE — PROGRESS NOTES
50 meq Duragesic patch applied to Patients' left shoulder per MD order. Patient ambulating in room without assistance, voices no concerns at this time. Will continue to monitor.

## 2017-09-13 NOTE — PROGRESS NOTES
Medicated with 10mg Oxycodone PO for complaints of 8/10 right flank pain. Sitting up in recliner with television on. Will continue to monitor.

## 2017-09-13 NOTE — PROGRESS NOTES
Benny Hill  Admission Date: 9/9/2017             Daily Progress Note: 9/13/2017    The patient's chart is reviewed and the patient is discussed with the staff. Benny Hill is a 61yoF who was admitted after a fall with rib fractures, atelectasis, small pneumothorax. Her course has been complicated by pain control issues. Subjective:     Complains that her pain is still not well controlled. Pain now at 6/10 with pain meds. Did use rib binder with minimal relief. Did try to ambulate but very sore.     Current Facility-Administered Medications   Medication Dose Route Frequency    ibuprofen (MOTRIN) tablet 600 mg  600 mg Oral TID    oxyCODONE IR (ROXICODONE) tablet 10 mg  10 mg Oral Q6H PRN    docusate sodium (COLACE) capsule 100 mg  100 mg Oral BID    HYDROcodone-acetaminophen (NORCO)  mg tablet 1 Tab  1 Tab Oral Q6H    LORazepam (ATIVAN) tablet 1 mg  1 mg Oral Q6H PRN    sodium chloride (NS) flush 5-10 mL  5-10 mL IntraVENous Q8H    sodium chloride (NS) flush 5-10 mL  5-10 mL IntraVENous PRN    albuterol (PROVENTIL VENTOLIN) nebulizer solution 2.5 mg  2.5 mg Nebulization QID RT    ondansetron (ZOFRAN) injection 4 mg  4 mg IntraVENous Q4H PRN    bisacodyl (DULCOLAX) tablet 5 mg  5 mg Oral DAILY PRN    enoxaparin (LOVENOX) injection 40 mg  40 mg SubCUTAneous Q24H    pantoprazole (PROTONIX) tablet 40 mg  40 mg Oral ACB    influenza vaccine 2015-16 (36mos+)(PF) (FLUZONE/FLUARIX QUAD) injection 0.5 mL  0.5 mL IntraMUSCular PRIOR TO DISCHARGE       Review of Systems  Constitutional: negative for fever, chills, sweats  Cardiovascular: negative for palpitations, syncope, edema  Gastrointestinal:  negative for dysphagia, reflux, vomiting, diarrhea, abdominal pain, or melena  Neurologic:  negative for focal weakness, numbness, headache    Objective:     Vitals:    09/13/17 0331 09/13/17 0635 09/13/17 0710 09/13/17 0741   BP: 130/76   119/70   Pulse: 85   87   Resp: 18   16 Temp: 98.7 °F (37.1 °C)   98.5 °F (36.9 °C)   SpO2: 97%  93% 100%   Weight:  120 lb 8 oz (54.7 kg)     Height:         Intake and Output:   09/11 1901 - 09/13 0700  In: 960 [P.O.:960]  Out: -        Physical Exam:   Constitution:  the patient is well developed and in no acute distress  EENMT:  Sclera clear, pupils equal, oral mucosa moist  Respiratory: decreased bilaterally but CTA  Cardiovascular:  RRR without M,G,R  Gastrointestinal: soft and non-tender; with positive bowel sounds. Musculoskeletal: warm without cyanosis. There is no lower leg edema. Skin:  no jaundice or rashes  Neurologic: no gross neuro deficits     Psychiatric:  alert and oriented x 3    CXR:   Last cxr reviewed with no ptx    LAB  No results for input(s): GLUCPOC in the last 72 hours. No lab exists for component: GLPOC   No results for input(s): WBC, HGB, HCT, PLT, INR, HGBEXT, HCTEXT, PLTEXT, HGBEXT, HCTEXT, PLTEXT in the last 72 hours. No lab exists for component: INREXT, INREXT  No results for input(s): NA, K, CL, CO2, GLU, BUN, CREA, MG, CA, PHOS, TROIQ, ALB, TBIL, TBILI, GPT, ALT, SGOT, BNPP in the last 72 hours. No lab exists for component: TROIP  No results for input(s): PH, PCO2, PO2, HCO3 in the last 72 hours. No results for input(s): LCAD, LAC in the last 72 hours. Assessment:  (Medical Decision Making)     Hospital Problems  Date Reviewed: 9/12/2017          Codes Class Noted POA    Tobacco abuse (Chronic) ICD-10-CM: Z72.0  ICD-9-CM: 305.1  9/9/2017 Yes    Smoking cessation education now    * (Principal)Fracture of multiple ribs ICD-10-CM: S22.49XA  ICD-9-CM: 807.09  9/9/2017 Yes    Overview Signed 9/9/2017  3:16 PM by Ervin Leblanc NP     Ribs 9-11             Fall ICD-10-CM: W19. Read Stanley  ICD-9-CM: E888.9  9/9/2017 Yes        Pneumothorax ICD-10-CM: J93.9  ICD-9-CM: 512.89  9/9/2017 Yes    Resolved, no chest tube indicated    H/O ETOH abuse (Chronic) ICD-10-CM: J15.859  ICD-9-CM: 305.03  9/9/2017 Yes        Rib fractures ICD-10-CM: S22.39XA  ICD-9-CM: 807.00  9/9/2017 Unknown    On     Fracture of ribs, multiple, closed ICD-10-CM: S22.49XA  ICD-9-CM: 807.09  9/9/2017 Unknown            Chest wall pain:  On norco q6, prn oxycodone, now scheduled ibuprofen. Plan:  (Medical Decision Making)       --add fentanyl patch and will get palliative care since pain is a major issue and still with high score despite scheduled meds. norco and ibuprofen  --Rib binder, IS, OOB  --bowel regimen and finally moving bowels  --discharge to [facility] pending insurance approval    More than 50% of the time documented was spent in face-to-face contact with the patient and in the care of the patient on the floor/unit where the patient is located.     Mauro Logan MD

## 2017-09-13 NOTE — PROGRESS NOTES
Medicated with 10mg Oxycodone PO for complaints of 9/10 right flank pain. Patient wearing abdominal binder.

## 2017-09-13 NOTE — PROGRESS NOTES
Problem: Mobility Impaired (Adult and Pediatric)  Goal: *Acute Goals and Plan of Care (Insert Text)  1. Ms. Freda Reno will perform supine to sit and sit to supine independently in 3 days. 2. Ms. Freda Reno will perform sit to stand and bed to chair independently in 3 days. 3. Ms. Freda Reno will perform gait with cane independently 400 ft in 3 days. PHYSICAL THERAPY: Daily Note, Treatment Day: 2nd and AM 9/13/2017  INPATIENT: Hospital Day: 5  Payor: [facility] MEDICARE / Plan: [facility] MEDIC[facility] / Product Type: Managed Care Medicare /      NAME/AGE/GENDER: Ren Salazar is a 65 y.o. female             PRIMARY DIAGNOSIS: Rib fractures  Fracture of ribs, multiple, closed  Pneumothorax Fracture of multiple ribs Fracture of multiple ribs        ICD-10: Treatment Diagnosis:       · Other abnormalities of gait and mobility (R26.89)  · History of falling (Z91.81)   Precaution/Allergies:  Review of patient's allergies indicates no known allergies. ASSESSMENT:      Ms. Freda Reno presents in pain after suffering rib fractures from a fall in the bathtub. She was sitting at [employer] dressed in street clothes stating she thinks she's being d/c'ed. She agreed to work with PT. She stood and ambulated ~250' very slowly with short steps and with cane in right hand with CGA on left. She ambulated a little past her room and when asked if she wanted to continue she stated she did not want to over do it. Discussed safety at home upon d/c. Making good progress by gradually increasing distance. This section established at most recent assessment   PROBLEM LIST (Impairments causing functional limitations):  1. Decreased Transfer Abilities  2. Decreased Ambulation Ability/Technique  3. Increased Pain  4. Decreased Activity Tolerance    INTERVENTIONS PLANNED: (Benefits and precautions of physical therapy have been discussed with the patient.)  1. Bed Mobility  2. Gait Training  3. Therapeutic Activites  4.  Transfer Training TREATMENT PLAN: Frequency/Duration: 3 times a week for duration of hospital stay  Rehabilitation Potential For Stated Goals: GOOD      RECOMMENDED REHABILITATION/EQUIPMENT: (at time of discharge pending progress): Due to the probability of continued deficits (see above) this patient will likely need continued skilled physical therapy after discharge. Equipment:   · None at this time                   HISTORY:   History of Present Injury/Illness (Reason for Referral):  Patient is a 65 y.o.  female presents s/p fall with L flank pain. Patient has a history of ETOH abuse (reports her last drink was 6 years ago) and tobacco but is quitting and is down to 2 cigarettes / day. She was admitted 11/2015 at [address] [place] [address] for ETOH abuse and [facility] anxiety with suicide attempts x 4 with xanax overdose and admission to [facility] x 4. She was most recently seen at [facility] 6/2017 and was admitted to [facility] for ETOH abuse. Patient presents to the ER after falling in the bathroom. She recalls hitting her head but denies LOC. She states that she landed on her R side and has multiple abrasions. She presented to the ER for further evaluation. CT head without acute abnormality. CT chest with multiple rib fractures (9-11), small R pleural fluid collection, and small anterior R PTX. She received medication for pain but continues to c/o significant discomfort. She will be admitted for pain control. Past Medical History/Comorbidities:   Ms. Tatiana Macias  has a past medical history of Alcohol abuse (10/31/2015); Alcohol withdrawal ([address] (10/31/2015); Asthma; Other ill-defined conditions; Other ill-defined conditions; Psychiatric disorder; and Seizures ([address] She also has no past medical history of Arthritis; Autoimmune disease ([address] CAD (coronary artery disease); Cancer ([address] Chronic kidney disease; COPD; DEMENTIA; Diabetes ([address] Endocrine disease; Heart failure ([address] Hypertension;  Infectious disease; Liver disease; PUD (peptic ulcer disease); Sleep disorder; Stroke [facility]); or Thromboembolus ([facility] Ms. Verito Kenyon  has a past surgical history that includes gyn; orthopaedic; and heent. Social History/Living Environment:   Home Environment: Apartment  # Steps to Enter: 0  One/Two Story Residence: One story  # of Interior Steps:  (0)  Height of Each Step (in):  (0)  Interior Rails: None  Lift Chair Available: No  Living Alone: Yes  Support Systems: Friends \ neighbors  Patient Expects to be Discharged to[de-identified] Apartment  Current DME Used/Available at Home: Cane, straight, Grab bars  Tub or Shower Type: Tub/Shower combination  Prior Level of Function/Work/Activity:  independent  ETOH history, psych disorder   Number of Personal Factors/Comorbidities that affect the Plan of Care: 1-2: MODERATE COMPLEXITY   EXAMINATION:   Most Recent Physical Functioning:   Gross Assessment:                  Posture:     Balance:  Sitting: Intact  Standing: Impaired  Standing - Static: Good  Standing - Dynamic : Fair (+) Bed Mobility:     Wheelchair Mobility:     Transfers:  Sit to Stand: Stand-by asssistance  Stand to Sit: Independent  Gait:     Distance (ft): 250 Feet (ft)  Assistive Device: Cane, straight  Ambulation - Level of Assistance: Contact guard assistance  Interventions: Safety awareness training;Verbal cues       Body Structures Involved:  1. Muscles Body Functions Affected:  1. Movement Related Activities and Participation Affected:  1. Mobility   Number of elements that affect the Plan of Care: 3: MODERATE COMPLEXITY   CLINICAL PRESENTATION:   Presentation: Evolving clinical presentation with changing clinical characteristics: MODERATE COMPLEXITY   CLINICAL DECISION MAKIN [facility] Mobility Inpatient Short Form  How much difficulty does the patient currently have. .. Unable A Lot A Little None   1. Turning over in bed (including adjusting bedclothes, sheets and blankets)?    [ ] 1   [X] 2   [ ] 3 [ ] 4   2. Sitting down on and standing up from a chair with arms ( e.g., wheelchair, bedside commode, etc.)   [ ] 1   [ ] 2   [X] 3   [ ] 4   3. Moving from lying on back to sitting on the side of the bed? [ ] 1   [X] 2   [ ] 3   [ ] 4   How much help from another person does the patient currently need. .. Total A Lot A Little None   4. Moving to and from a bed to a chair (including a wheelchair)? [ ] 1   [ ] 2   [X] 3   [ ] 4   5. Need to walk in hospital room? [ ] 1   [ ] 2   [X] 3   [ ] 4   6. Climbing 3-5 steps with a railing? [ ] 1   [ ] 2   [X] 3   [ ] 4   © 2007, Trustees of [address] [place] [address] [address] [address], under license to [employer]. All rights reserved    Score:  Initial: 16 Most Recent: X (Date: -- )     Interpretation of Tool:  Represents activities that are increasingly more difficult (i.e. Bed mobility, Transfers, Gait). Score 24 23 22-20 19-15 14-10 9-7 6       Modifier CH CI CJ CK CL CM CN         · Mobility - Walking and Moving Around:               - CURRENT STATUS:    CK - 40%-59% impaired, limited or restricted               - GOAL STATUS:           CK - 40%-59% impaired, limited or restricted               - D/C STATUS:                       ---------------To be determined---------------  Payor: [facility] MEDICARE / Plan: [facility] MEDICARE / Product Type: Managed Care Medicare /       Medical Necessity:     · Patient is expected to demonstrate progress in functional technique to increase independence with mobility and gait. .  Reason for Services/Other Comments:  · Patient continues to require present interventions due to patient's inability to function at baseline. .   Use of outcome tool(s) and clinical judgement create a POC that gives a: Questionable prediction of patient's progress: MODERATE COMPLEXITY                 TREATMENT:   (In addition to Assessment/Re-Assessment sessions the following treatments were rendered)   Pre-treatment Symptoms/Complaints: Pain but eager for d/c  Pain: Initial:   Pain Intensity 1: 6  Pain Location 1: Rib cage  Post Session:        Therapeutic Activity: (    23 minutes): Therapeutic activities including Bed transfers and Ambulation on level ground to improve mobility, strength and balance. Required minimal Safety awareness training;Verbal cues to promote dynamic balance in standing with use of her cane. Braces/Orthotics/Lines/Etc:   · O2 Device: Room air  Treatment/Session Assessment:    · Response to Treatment:  good  · Interdisciplinary Collaboration:  · Physical Therapy Assistant and Registered Nurse  · After treatment position/precautions:  · Supine in bed, Bed/Chair-wheels locked, Bed in low position, Call light within reach and RN notified  · Compliance with Program/Exercises: compliant all of the time. · Recommendations/Intent for next treatment session: \"Next visit will focus on advancements to more challenging activities and reduction in assistance provided\".   Total Treatment Duration:PT Patient Time In/Time Out  Time In: 0930  Time Out: [address] [facility]

## 2017-09-14 ENCOUNTER — HOME HEALTH ADMISSION (OUTPATIENT)
Dept: HOME HEALTH SERVICES | Facility: HOME HEALTH | Age: 62
End: 2017-09-14
Payer: MEDICARE

## 2017-09-14 VITALS
BODY MASS INDEX: 20.24 KG/M2 | RESPIRATION RATE: 20 BRPM | SYSTOLIC BLOOD PRESSURE: 126 MMHG | HEIGHT: 65 IN | TEMPERATURE: 98.5 F | HEART RATE: 92 BPM | WEIGHT: 121.5 LBS | DIASTOLIC BLOOD PRESSURE: 80 MMHG | OXYGEN SATURATION: 99 %

## 2017-09-14 PROCEDURE — 74011250637 HC RX REV CODE- 250/637: Performed by: NURSE PRACTITIONER

## 2017-09-14 PROCEDURE — 94640 AIRWAY INHALATION TREATMENT: CPT

## 2017-09-14 PROCEDURE — 74011000250 HC RX REV CODE- 250: Performed by: NURSE PRACTITIONER

## 2017-09-14 PROCEDURE — 99239 HOSP IP/OBS DSCHRG MGMT >30: CPT | Performed by: INTERNAL MEDICINE

## 2017-09-14 PROCEDURE — 94760 N-INVAS EAR/PLS OXIMETRY 1: CPT

## 2017-09-14 PROCEDURE — 74011250637 HC RX REV CODE- 250/637: Performed by: INTERNAL MEDICINE

## 2017-09-14 RX ORDER — FENTANYL 50 UG/1
1 PATCH TRANSDERMAL
Qty: 5 PATCH | Refills: 0 | Status: SHIPPED | OUTPATIENT
Start: 2017-09-14 | End: 2017-09-28

## 2017-09-14 RX ORDER — AMOXICILLIN 250 MG
1 CAPSULE ORAL 2 TIMES DAILY
Qty: 60 TAB | Status: ON HOLD | OUTPATIENT
Start: 2017-09-14 | End: 2019-07-11

## 2017-09-14 RX ORDER — OXYCODONE HYDROCHLORIDE 10 MG/1
10 TABLET ORAL
Qty: 30 TAB | Refills: 0 | Status: SHIPPED | OUTPATIENT
Start: 2017-09-14 | End: 2019-01-02

## 2017-09-14 RX ORDER — IBUPROFEN 600 MG/1
600 TABLET ORAL 3 TIMES DAILY
Qty: 90 TAB | Refills: 1 | Status: SHIPPED | OUTPATIENT
Start: 2017-09-14 | End: 2019-01-02

## 2017-09-14 RX ORDER — PANTOPRAZOLE SODIUM 40 MG/1
40 TABLET, DELAYED RELEASE ORAL
Qty: 30 TAB | Refills: 1 | Status: ON HOLD | OUTPATIENT
Start: 2017-09-14 | End: 2019-07-11

## 2017-09-14 RX ADMIN — STANDARDIZED SENNA CONCENTRATE AND DOCUSATE SODIUM 1 TABLET: 8.6; 5 TABLET, FILM COATED ORAL at 09:23

## 2017-09-14 RX ADMIN — Medication 5 ML: at 05:42

## 2017-09-14 RX ADMIN — IBUPROFEN 600 MG: 600 TABLET, FILM COATED ORAL at 09:22

## 2017-09-14 RX ADMIN — PANTOPRAZOLE SODIUM 40 MG: 40 TABLET, DELAYED RELEASE ORAL at 05:42

## 2017-09-14 RX ADMIN — ALBUTEROL SULFATE 2.5 MG: 2.5 SOLUTION RESPIRATORY (INHALATION) at 07:30

## 2017-09-14 RX ADMIN — OXYCODONE HYDROCHLORIDE 10 MG: 5 TABLET ORAL at 05:42

## 2017-09-14 RX ADMIN — OXYCODONE HYDROCHLORIDE 10 MG: 5 TABLET ORAL at 09:52

## 2017-09-14 RX ADMIN — OXYCODONE HYDROCHLORIDE 10 MG: 5 TABLET ORAL at 00:44

## 2017-09-14 NOTE — PROGRESS NOTES
Morning bedside rounding report received from Michelle Steve, JESUS. Patient ambulating in hallway, with cane, demonstrating no signs of dyspnea or distress on room air. Wearing abdominal binder to ease rib cage pain. Voices no new concerns at this time. Will continue to monitor.

## 2017-09-14 NOTE — ADT AUTH CERT NOTES
LOC:Acute Adult-General Trauma (9/13/2017) by Missy Staley        Review Entered Review Status       9/13/2017 In Primary       Details         REVIEW SUMMARY     Patient: [facility]  Review Number: [id number]  Review Status: In Primary     Condition Specific: Yes     Condition Level Of Care Code: ACUTE  Condition Level Of Care Description: Acute        OUTCOMES  Outcome Type: Primary           REVIEW DETAILS     Service Date: 09/13/2017  Admit Date: 09/09/2017  Product: Zaira Lamb Adult  Subset: General Trauma      (Symptom or finding within 24h)         (Excludes PO medications unless noted)          [X] Select Day, One:              [X] Episode Day 3-X, One:                  [X] ACUTE, >= One:                      [X] General, One:                          [X] Partial responder, not clinically stable for discharge and requires continued stay, >= One:                              [X] Pain uncontrolled, Both:                                  [X] Pain assessment                                  [X] Requiring change in medication, dose, or frequency <= 2d                                  ~--Admin, IQ Admin Admin on 09- 01:58 PM--~                                  Daily Progress Note: 9/13/2017                                  Complains that her pain is still not well controlled. Pain now at 6/10 with pain meds. Did use rib binder with minimal relief. Did try to ambulate but very sore. Respiratory: decreased bilaterally but CTA                                      T 98.5, /70, P 87, R 16, 02 % RA                                  Plan:  (Medical Decision Making)                                          --add fentanyl patch and will get palliative care since pain is a major issue and still with high score despite scheduled meds.  norco and ibuprofen                                  --Rib binder, IS, OOB                                  --bowel regimen and finally moving bowels --discharge to [facility] pending insurance approval                                      ALBUTEROL NEB QID, LOVENOX SC Q 24 HRS, FENTANYL PATCH Q 72 HRS, NORCO PO Q 6 HRS SCHEDULED, MOTRIN PO TID, ROXICODONE PO Q 6 HRS PRN X 3 (5X/24 HRS),                      Version: InterQual® 2016.1  InterQual® and CareEnhance®  © 2016 [employer] and/or one of its [place]. All Rights Reserved. CPT only © 2015 American Medical Association. All Rights Reserved.                  LOC:Acute Adult-General Trauma (9/12/2017) by Prabha Burgess        Review Entered Review Status       9/13/2017 In Primary       Details         REVIEW SUMMARY     Patient: [facility]  Review Number: [id number]  Review Status: In Primary     Condition Specific: Yes     Condition Level Of Care Code: ACUTE  Condition Level Of Care Description: Acute        OUTCOMES  Outcome Type: Primary           REVIEW DETAILS     Service Date: 09/12/2017  Admit Date: 09/09/2017  Product: Ceola Banister Adult  Subset: General Trauma      (Symptom or finding within 24h)         (Excludes PO medications unless noted)          [X] Select Day, One:              [X] Episode Day 3-X, One:                  [X] ACUTE, >= One:                      [X] General, One:                          [X] Partial responder, not clinically stable for discharge and requires continued stay, >= One:                              [X] Pain uncontrolled, Both:                                  [X] Pain assessment                                  [X] Requiring change in medication, dose, or frequency <= 2d                                  ~--Admin, IQ Admin Admin on 09- 01:54 PM--~                                  Daily Progress Note: 9/12/2017                                  was admitted after a fall with rib fractures, atelectasis, small pneumothorax. Her course has been complicated by pain control issues.                                   Complains that her pain is still not well controlled                                  Doesn't have IS at bedside                                  Has bed at [facility] but her insurance has not yet approved ([employer])                                  EXAM: Respiratory: decreased bilaterally                                  T 98.4, /75, P 67, R 18,02 % 2L NC                                  Plan:  (Medical Decision Making)                                      --Rib binder, IS, OOB                                  --Scheduled norco and ibuprofen                                  --prn oxycodone in addition                                  --bowel regimen                                  --discharge to [facility] pending insurance approval                                      CM NOTE:  Awaiting [employer] insurance to approve pt going to [place] for str.                                      ALBUTEROL NEB QID, COLACE PO BID, LOVENOX SC Q 24 HRS, MORPHINE IV DC'D (REC'D 2X TODAY & 4X/24 HRS), NORCO PO Q 6 HRS PRN X 4 (8X/24 HRS), MOTRIN PO TID, ROXICODONE PO Q 6 HRS PRN X2,                      Version: InterQual® 2016.1  InterQual® and CareEnhance®  © [facility] and/or one of its subsidiaries. All Rights Reserved. CPT only © 2015 American Medical Association.   All Rights Reserved.              Additional Notes       CORRECTION TO MEDS: NORCO PO Q 6 HRS IS SCHEDULED (NOT PRN)

## 2017-09-14 NOTE — DISCHARGE INSTRUCTIONS
Collapsed Lung: Care Instructions  Your Care Instructions    A collapsed lung (pneumothorax) is a buildup of air in the space between the lung and the chest wall. As more air builds up in this space, the pressure against the lung makes the lung collapse. This causes shortness of breath and chest pain because your lung cannot fully expand. A collapsed lung is usually caused by an injury to the chest, but it may also occur suddenly without an injury because of a lung illness, such as emphysema or lung fibrosis. Your lung may collapse after lung surgery or another medical procedure. Sometimes it happens for no known reason in an otherwise healthy person (spontaneous pneumothorax). Treatment depends on the cause of the collapse. It may heal with rest, although your doctor will want to keep track of your progress. It can take several days for the lung to expand again. Your doctor may have drained the air with a needle or tube inserted into the space between your chest and the collapsed lung. If you have a chest tube, be sure to follow your doctor's instructions about how to care for the tube. You may need further treatment if you are not getting better. Surgery is sometimes needed to keep the lung inflated. The doctor will want to keep track of your progress, so you will need a follow-up exam within a few days. The doctor has checked you carefully, but problems can develop later. If you notice any problems or new symptoms, get medical treatment right away. Follow-up care is a key part of your treatment and safety. Be sure to make and go to all appointments, and call your doctor if you are having problems. It's also a good idea to know your test results and keep a list of the medicines you take. How can you care for yourself at home? · Get plenty of rest and sleep. You may feel weak and tired for a while, but your energy level will improve with time.   · Hold a pillow against your chest when you cough or take deep breaths. This will support your chest and decrease your pain. · Take pain medicines exactly as directed. ¨ If the doctor gave you a prescription medicine for pain, take it as prescribed. ¨ If you are not taking a prescription pain medicine, ask your doctor if you can take an over-the-counter medicine. · If your doctor prescribed antibiotics, take them as directed. Do not stop taking them just because you feel better. You need to take the full course of antibiotics. · If you have a bandage over your chest tube, or the place where the chest tube was inserted, keep it clean and dry. Follow your doctor's instructions on bandage care. · If you go home with a tube in place, follow the doctor's directions. Do not adjust the tube in any way. This could break the seal or cause other problems. Keep the tube dry. · Avoid any movements that require your muscles, especially your chest muscles, to strain. Such movements include laughing hard, bearing down to have a bowel movement, and heavy lifting. Try not to cough. · Do not fly in an airplane until your doctor tells you it is okay. Avoid any situations where there is increased air pressure. · Do not smoke or allow others to smoke around you. If you need help quitting, talk to your doctor about stop-smoking programs and medicines. These can increase your chances of quitting for good. When should you call for help? Call 911 anytime you think you may need emergency care. For example, call if:  · You have severe trouble breathing. · You passed out (lost consciousness). Call your doctor now or seek immediate medical care if:  · You have new or worse trouble breathing. · You have new pain or your pain gets worse. · You have a fever. · You cough up blood. · Your chest tube starts to come out or falls out. · You are bleeding through the bandage where the tube was put in.   Watch closely for changes in your health, and be sure to contact your doctor if:  · The skin around the place where the chest tube was put in is red or irritated. · You do not get better as expected. Where can you learn more? Go to http://sanchez-mila.info/. Enter C248 in the search box to learn more about \"Collapsed Lung: Care Instructions. \"  Current as of: March 25, 2017  Content Version: 11.3  © 3285-3656 [employer]. Care instructions adapted under license by [employer] (which disclaims liability or warranty for this information). If you have questions about a medical condition or this instruction, always ask your healthcare professional. [place] [address] any warranty or liability for your use of this information. Broken Rib: Care Instructions  Your Care Instructions    A broken rib is a crack or break in one of the bones of the rib cage. Breathing can be very painful because the muscles used for breathing pull on the rib. In most cases, a broken rib will heal on its own. You can take pain medicine while the rib mends. Pain relief allows you to take deep breaths. In the past, doctors recommended taping or wrapping broken ribs. This is no longer done because taping makes it hard for you to take deep breaths. You need to take deep breaths at least once an hour to prevent pneumonia or a partial collapse of a lung. Your rib will heal in about 6 weeks. You heal best when you take good care of yourself. Eat a variety of healthy foods, and don't smoke. Follow-up care is a key part of your treatment and safety. Be sure to make and go to all appointments, and call your doctor if you are having problems. It's also a good idea to know your test results and keep a list of the medicines you take. How can you care for yourself at home? · Be safe with medicines. Read and follow all instructions on the label. ¨ If the doctor gave you a prescription medicine for pain, take it as prescribed.   ¨ If you are not taking a prescription pain medicine, ask your doctor if you can take an over-the-counter medicine. · Even if it hurts, cough or take the deepest breath you can at least once every hour. This will get air deeply into your lungs and reduce your chance of getting pneumonia or a partial collapse of a lung. Hold a pillow against your chest to make this less painful. · Put ice or a cold pack on the area for 10 to 20 minutes at a time. Put a thin cloth between the ice and your skin. When should you call for help? Call 911 anytime you think you may need emergency care. For example, call if:  · You have severe trouble breathing. Call your doctor now or seek immediate medical care if:  · You have some trouble breathing. · You have a fever. · You have a new or worse cough. Watch closely for changes in your health, and be sure to contact your doctor if:  · You have pain even after taking your medicine. · You do not get better as expected. Where can you learn more? Go to http://sanchez-mila.info/. Enter M135 in the search box to learn more about \"Broken Rib: Care Instructions. \"  Current as of: March 21, 2017  Content Version: 11.3  © 6849-2471 [employer]. Care instructions adapted under license by [employer] (which disclaims liability or warranty for this information). If you have questions about a medical condition or this instruction, always ask your healthcare professional. [place] [address] any warranty or liability for your use of this information. DISCHARGE SUMMARY from Nurse    The following personal items are in your possession at time of discharge:    Dental Appliances: Partials, With patient  Visual Aid: None     Home Medications: None  Jewelry: None  Clothing:  At bedside, Pants, Sweater, With patient, Slippers  Other Valuables: [place], [employer], Purse  Personal Items Sent to Safe: none          PATIENT INSTRUCTIONS:    After general anesthesia or intravenous sedation, for 24 hours or while taking prescription Narcotics:  · Limit your activities  · Do not drive and operate hazardous machinery  · Do not make important personal or business decisions  · Do  not drink alcoholic beverages  · If you have not urinated within 8 hours after discharge, please contact your surgeon on call. Report the following to your surgeon:  · Excessive pain, swelling, redness or odor of or around the surgical area  · Temperature over 100.5  · Nausea and vomiting lasting longer than 4 hours or if unable to take medications  · Any signs of decreased circulation or nerve impairment to extremity: change in color, persistent  numbness, tingling, coldness or increase pain  · Any questions        What to do at Home:  Recommended activity: Activity as tolerated,       *  Please give a list of your current medications to your Primary Care Provider. *  Please update this list whenever your medications are discontinued, doses are      changed, or new medications (including over-the-counter products) are added. *  Please carry medication information at all times in case of emergency situations. These are general instructions for a healthy lifestyle:    No smoking/ No tobacco products/ Avoid exposure to second hand smoke    Surgeon General's Warning:  Quitting smoking now greatly reduces serious risk to your health. Obesity, smoking, and sedentary lifestyle greatly increases your risk for illness    A healthy diet, regular physical exercise & weight monitoring are important for maintaining a healthy lifestyle    You may be retaining fluid if you have a history of heart failure or if you experience any of the following symptoms:  Weight gain of 3 pounds or more overnight or 5 pounds in a week, increased swelling in our hands or feet or shortness of breath while lying flat in bed.   Please call your doctor as soon as you notice any of these symptoms; do not wait until your next office visit. Recognize signs and symptoms of STROKE:    F-face looks uneven    A-arms unable to move or move unevenly    S-speech slurred or non-existent    T-time-call 911 as soon as signs and symptoms begin-DO NOT go       Back to bed or wait to see if you get better-TIME IS BRAIN. Warning Signs of HEART ATTACK     Call 911 if you have these symptoms:   Chest discomfort. Most heart attacks involve discomfort in the center of the chest that lasts more than a few minutes, or that goes away and comes back. It can feel like uncomfortable pressure, squeezing, fullness, or pain.  Discomfort in other areas of the upper body. Symptoms can include pain or discomfort in one or both arms, the back, neck, jaw, or stomach.  Shortness of breath with or without chest discomfort.  Other signs may include breaking out in a cold sweat, nausea, or lightheadedness. Don't wait more than five minutes to call 911 - MINUTES MATTER! Fast action can save your life. Calling 911 is almost always the fastest way to get lifesaving treatment. Emergency Medical Services staff can begin treatment when they arrive -- up to an hour sooner than if someone gets to the hospital by car. The discharge information has been reviewed with the patient. The patient verbalized understanding. Discharge medications reviewed with the patient and appropriate educational materials and side effects teaching were provided.

## 2017-09-14 NOTE — PROGRESS NOTES
Discharge instructions and prescriptions provided and explained to the pt. Med side effect sheet reviewed. Opportunity for questions provided. Case management arranging transportation home.

## 2017-09-14 NOTE — PROGRESS NOTES
Pt will go home with [facility] RN/PT/SW. Pt has an appt on 9/18 with [facility]. Sw remains available.

## 2017-09-14 NOTE — DISCHARGE SUMMARY
DISCHARGE NOTE    Evelin   Admission date:  9/9/2017  Discharge date:  9/14/2017    Admitting Diagnosis:  Rib fractures;Fracture of ribs, multiple, closed; Pneumothor*    Discharge Diagnoses:    Hospital Problems  Date Reviewed: 9/12/2017          Codes Class Noted POA    Tobacco abuse (Chronic) ICD-10-CM: Z72.0  ICD-9-CM: 305.1  9/9/2017 Yes        * (Principal)Fracture of multiple ribs ICD-10-CM: S22.49XA  ICD-9-CM: 807.09  9/9/2017 Yes    Overview Signed 9/9/2017  3:16 PM by Drea Bailey NP     Ribs 9-11             Fall ICD-10-CM: W19. Travon Marika  ICD-9-CM: E888.9  9/9/2017 Yes        Pneumothorax ICD-10-CM: J93.9  ICD-9-CM: 512.89  9/9/2017 Yes        H/O ETOH abuse (Chronic) ICD-10-CM: Z87.898  ICD-9-CM: 305.03  9/9/2017 Yes        Rib fractures ICD-10-CM: S22.39XA  ICD-9-CM: 807.00  9/9/2017 Unknown        Fracture of ribs, multiple, closed ICD-10-CM: S22.49XA  ICD-9-CM: 807.09  9/9/2017 Unknown              Consultants:Palliative Care    Studies/Procedures:  CXR  Chest CT  Head CT    Condition on Discharge:  stable    Disposition:  Discharge home with home health, PT, and social work      Hospital course:  Patient is a 65 y.o.  female presents s/p fall with L flank pain.  Patient has a history of ETOH abuse (reports her last drink was 6 years ago) and tobacco but is quitting and is down to 2 cigarettes / day. Sherie Vasquez was admitted 11/2015 at [facility] for ETOH abuse and [facility] lists anxiety with suicide attempts x 4 with xanax overdose and admission to [facility] x 4.  She was most recently seen at [facility] 6/2017 and was admitted to [facility] for ETOH abuse.        Patient presents to the ER after falling in the bathroom.  She recalls hitting her head but denies LOC.  She states that she landed on her R side and has multiple abrasions.  CT head without acute abnormality.  CT chest with multiple rib fractures (9-11), small R pleural fluid collection, and small anterior R PTX.  She received medication for pain but continues to c/o significant discomfort.  She was admitted for pain control. Her CXR improved but she remained weak. She was weaned off of O2 and pt was seen by Palliative Care to assist with pain control. She was placed on a Fentanyl patch with improvement of symptoms. She will be discharged home with rib binder, IS, antiinflammatories, and home PT. Physical Exam:   Constitution:  the patient is well developed and in no acute distress  EENMT:  Sclera clear, pupils equal, oral mucosa moist  Respiratory: CTA b/l. Improved aeration today. Cardiovascular:  RRR without M,G,R  Gastrointestinal: soft and non-tender; with positive bowel sounds. Musculoskeletal: warm without cyanosis. There is no lower leg edema. Skin:  no jaundice or rashes, no wounds   Neurologic: no gross neuro deficits     Psychiatric:  alert and oriented x 3      LAB  No results for input(s): WBC, HGB, HCT, PLT, INR, HGBEXT, HCTEXT, PLTEXT, HGBEXT, HCTEXT, PLTEXT in the last 72 hours. No lab exists for component: INREXT, INREXT  No results for input(s): NA, K, CL, CO2, BUN, CREA, MG, PHOS, CA, TROIQ, ALB, TBIL, TBILI, GPT, ALT, SGOT, BNPP in the last 72 hours. No lab exists for component: TROIP  No results for input(s): PH, PCO2, PO2, HCO3 in the last 72 hours. Discharge Medications:   Current Discharge Medication List      START taking these medications    Details   fentaNYL (DURAGESIC) 50 mcg/hr PATCH 1 Patch by TransDERmal route every seventy-two (72) hours for 14 days. Max Daily Amount: 1 Patch. Qty: 5 Patch, Refills: 0      oxyCODONE IR (ROXICODONE) 10 mg tab immediate release tablet Take 1 Tab by mouth every four (4) hours as needed. Max Daily Amount: 60 mg.  Qty: 30 Tab, Refills: 0      pantoprazole (PROTONIX) 40 mg tablet Take 1 Tab by mouth Daily (before breakfast).  Indications: while taking high dose ibuprofen for rib fractures  Qty: 30 Tab, Refills: 1      senna-docusate (PERICOLACE) 8.6-50 mg per tablet Take 1 Tab by mouth two (2) times a day. Qty: 60 Tab, Refills: -      ibuprofen (MOTRIN) 600 mg tablet Take 1 Tab by mouth three (3) times daily. Qty: 90 Tab, Refills: 1         CONTINUE these medications which have NOT CHANGED    Details   LORazepam (ATIVAN) 1 mg tablet Take 1 Tab by mouth every six (6) hours as needed for Anxiety. Max Daily Amount: 4 mg. Qty: 20 Tab, Refills: 0      gabapentin (NEURONTIN) 400 mg capsule Take 400 mg by mouth three (3) times daily. Condition on Discharge:  stable      Followup/Outpt Studies:  --get new PMD in 2 weeks -- spoke with scial worker and they can address pain meds. Gave enough pain meds for 2 weeks. --Follow up appointment with [facility] in 4-6 weeks with CXR. --Total discharge greater than 30 minutes in duration. Did go to the [employer]. Patient is using schedule II medications appropriately with no evidence of abuse/misuse. New medications ordered appropriately      More than 50% of the time documented was spent in face-to-face contact with the patient and in the care of the patient on the floor/unit where the patient is located.     Dannis Hodgkin, MD

## 2017-09-14 NOTE — PROGRESS NOTES
Medicated with 10mg Oxycodone for 8/10 back and rib cage pain. Ambulating in room with cane. Preparing for discharge to home today. Will continue to monitor.

## 2017-09-14 NOTE — PROGRESS NOTES
Patient seen and improved, will send her home today. Spoke with palliative care and agrees doing well on fentaly with prn pain meds.     Cal Gutierrez MD

## 2017-09-15 ENCOUNTER — HOME CARE VISIT (OUTPATIENT)
Dept: SCHEDULING | Facility: HOME HEALTH | Age: 62
End: 2017-09-15
Payer: MEDICARE

## 2017-09-15 VITALS
RESPIRATION RATE: 16 BRPM | HEART RATE: 107 BPM | TEMPERATURE: 98.5 F | OXYGEN SATURATION: 97 % | SYSTOLIC BLOOD PRESSURE: 110 MMHG | DIASTOLIC BLOOD PRESSURE: 74 MMHG

## 2017-09-15 PROCEDURE — 400013 HH SOC

## 2017-09-15 PROCEDURE — 3331090002 HH PPS REVENUE DEBIT

## 2017-09-15 PROCEDURE — 3331090001 HH PPS REVENUE CREDIT

## 2017-09-15 PROCEDURE — G0299 HHS/HOSPICE OF RN EA 15 MIN: HCPCS

## 2017-09-16 PROCEDURE — 3331090001 HH PPS REVENUE CREDIT

## 2017-09-16 PROCEDURE — 3331090002 HH PPS REVENUE DEBIT

## 2017-09-17 PROCEDURE — 3331090002 HH PPS REVENUE DEBIT

## 2017-09-17 PROCEDURE — 3331090001 HH PPS REVENUE CREDIT

## 2017-09-18 ENCOUNTER — HOME CARE VISIT (OUTPATIENT)
Dept: SCHEDULING | Facility: HOME HEALTH | Age: 62
End: 2017-09-18
Payer: MEDICARE

## 2017-09-18 VITALS
TEMPERATURE: 97.5 F | HEART RATE: 84 BPM | SYSTOLIC BLOOD PRESSURE: 120 MMHG | DIASTOLIC BLOOD PRESSURE: 82 MMHG | OXYGEN SATURATION: 99 % | RESPIRATION RATE: 19 BRPM

## 2017-09-18 PROCEDURE — 3331090001 HH PPS REVENUE CREDIT

## 2017-09-18 PROCEDURE — 3331090002 HH PPS REVENUE DEBIT

## 2017-09-18 PROCEDURE — G0151 HHCP-SERV OF PT,EA 15 MIN: HCPCS

## 2017-09-19 ENCOUNTER — HOME CARE VISIT (OUTPATIENT)
Dept: HOME HEALTH SERVICES | Facility: HOME HEALTH | Age: 62
End: 2017-09-19
Payer: MEDICARE

## 2017-09-19 PROCEDURE — 3331090001 HH PPS REVENUE CREDIT

## 2017-09-19 PROCEDURE — 3331090002 HH PPS REVENUE DEBIT

## 2017-09-20 PROCEDURE — 3331090002 HH PPS REVENUE DEBIT

## 2017-09-20 PROCEDURE — 3331090001 HH PPS REVENUE CREDIT

## 2017-09-21 PROCEDURE — 3331090002 HH PPS REVENUE DEBIT

## 2017-09-21 PROCEDURE — 3331090001 HH PPS REVENUE CREDIT

## 2017-09-22 ENCOUNTER — HOME CARE VISIT (OUTPATIENT)
Dept: SCHEDULING | Facility: HOME HEALTH | Age: 62
End: 2017-09-22
Payer: MEDICARE

## 2017-09-22 PROCEDURE — 3331090001 HH PPS REVENUE CREDIT

## 2017-09-22 PROCEDURE — 3331090002 HH PPS REVENUE DEBIT

## 2017-09-23 PROCEDURE — 3331090001 HH PPS REVENUE CREDIT

## 2017-09-23 PROCEDURE — 3331090002 HH PPS REVENUE DEBIT

## 2017-09-24 ENCOUNTER — HOSPITAL ENCOUNTER (EMERGENCY)
Age: 62
Discharge: HOME | End: 2017-09-24
Attending: EMERGENCY MEDICINE
Payer: MEDICARE

## 2017-09-24 ENCOUNTER — APPOINTMENT (OUTPATIENT)
Dept: GENERAL RADIOLOGY | Age: 62
End: 2017-09-24
Attending: NURSE PRACTITIONER
Payer: MEDICARE

## 2017-09-24 VITALS
OXYGEN SATURATION: 97 % | RESPIRATION RATE: 17 BRPM | SYSTOLIC BLOOD PRESSURE: 118 MMHG | WEIGHT: 121 LBS | HEART RATE: 99 BPM | HEIGHT: 65 IN | TEMPERATURE: 97.9 F | DIASTOLIC BLOOD PRESSURE: 79 MMHG | BODY MASS INDEX: 20.16 KG/M2

## 2017-09-24 DIAGNOSIS — R07.89 CHEST WALL PAIN: Primary | ICD-10-CM

## 2017-09-24 PROCEDURE — 3331090002 HH PPS REVENUE DEBIT

## 2017-09-24 PROCEDURE — 71020 XR CHEST PA LAT: CPT

## 2017-09-24 PROCEDURE — 74011250637 HC RX REV CODE- 250/637: Performed by: NURSE PRACTITIONER

## 2017-09-24 PROCEDURE — 99283 EMERGENCY DEPT VISIT LOW MDM: CPT | Performed by: EMERGENCY MEDICINE

## 2017-09-24 PROCEDURE — 3331090001 HH PPS REVENUE CREDIT

## 2017-09-24 RX ORDER — OXYCODONE AND ACETAMINOPHEN 5; 325 MG/1; MG/1
2 TABLET ORAL
Status: COMPLETED | OUTPATIENT
Start: 2017-09-24 | End: 2017-09-24

## 2017-09-24 RX ORDER — METHOCARBAMOL 750 MG/1
750 TABLET, FILM COATED ORAL 4 TIMES DAILY
Qty: 7 TAB | Refills: 0 | Status: SHIPPED | OUTPATIENT
Start: 2017-09-24 | End: 2017-10-02

## 2017-09-24 RX ADMIN — OXYCODONE HYDROCHLORIDE AND ACETAMINOPHEN 2 TABLET: 5; 325 TABLET ORAL at 15:54

## 2017-09-24 NOTE — DISCHARGE INSTRUCTIONS
Musculoskeletal Chest Pain: Care Instructions  Your Care Instructions  Chest pain is not always a sign that something is wrong with your heart or that you have another serious problem. The doctor thinks your chest pain is caused by strained muscles or ligaments, inflamed chest cartilage, or another problem in your chest, rather than by your heart. You may need more tests to find the cause of your chest pain. Follow-up care is a key part of your treatment and safety. Be sure to make and go to all appointments, and call your doctor if you are having problems. Its also a good idea to know your test results and keep a list of the medicines you take. How can you care for yourself at home? · Take pain medicines exactly as directed. ¨ If the doctor gave you a prescription medicine for pain, take it as prescribed. ¨ If you are not taking a prescription pain medicine, ask your doctor if you can take an over-the-counter medicine. · Rest and protect the sore area. · Stop, change, or take a break from any activity that may be causing your pain or soreness. · Put ice or a cold pack on the sore area for 10 to 20 minutes at a time. Try to do this every 1 to 2 hours for the next 3 days (when you are awake) or until the swelling goes down. Put a thin cloth between the ice and your skin. · After 2 or 3 days, apply a heating pad set on low or a warm cloth to the area that hurts. Some doctors suggest that you go back and forth between hot and cold. · Do not wrap or tape your ribs for support. This may cause you to take smaller breaths, which could increase your risk of lung problems. · Mentholated creams such as Bengay or Icy Hot may soothe sore muscles. Follow the instructions on the package. · Follow your doctor's instructions for exercising. · Gentle stretching and massage may help you get better faster. Stretch slowly to the point just before pain begins, and hold the stretch for at least 15 to 30 seconds.  Do this 3 or 4 times a day. Stretch just after you have applied heat. · As your pain gets better, slowly return to your normal activities. Any increased pain may be a sign that you need to rest a while longer. When should you call for help? Call 911 anytime you think you may need emergency care. For example, call if:  · You have chest pain or pressure. This may occur with:  ¨ Sweating. ¨ Shortness of breath. ¨ Nausea or vomiting. ¨ Pain that spreads from the chest to the neck, jaw, or one or both shoulders or arms. ¨ Dizziness or lightheadedness. ¨ A fast or uneven pulse. After calling 911, chew 1 adult-strength aspirin. Wait for an ambulance. Do not try to drive yourself. · You have sudden chest pain and shortness of breath, or you cough up blood. Call your doctor now or seek immediate medical care if:  · You have any trouble breathing. · Your chest pain gets worse. · Your chest pain occurs consistently with exercise and is relieved by rest.  Watch closely for changes in your health, and be sure to contact your doctor if:  · Your chest pain does not get better after 1 week. Where can you learn more? Go to http://sanchez-mila.info/. Enter V293 in the search box to learn more about \"Musculoskeletal Chest Pain: Care Instructions. \"  Current as of: March 20, 2017  Content Version: 11.3  © 9353-9826 [employer]. Care instructions adapted under license by [employer] (which disclaims liability or warranty for this information). If you have questions about a medical condition or this instruction, always ask your healthcare professional. [place] [address] any warranty or liability for your use of this information.

## 2017-09-24 NOTE — ED TRIAGE NOTES
Brought in via EMS from home. States pt was discharged from hospital on 9/14- was admitted d/t right rib fractures. Pt c/o of pain on left side x 4 days but states right side tender to palpation. States she is out of her pain medication. Pt denies any N/V/D. Pt denies any shortness of breath or chest pain. Pt is alert and oriented x 4. Respirations are even and unlabored. Pt appears in no acute distress at this time.

## 2017-09-24 NOTE — ED TRIAGE NOTES
Rudi Dias is a 65 y.o. female here for rib pain. Rapid assessment performed. --- Orders were placed. --- Patient will be waiting room  Signed By: Kai Martin NP     September 24, 2017          Recent multiple right rib fsx with admit w pneumothorax as well. Now pain moved to the other side. Shallow resp effort per ems, out of pain meds. Lungs clear per ems.  ekg , vss, sat 96 % room air  Complains with left sided pain today worse every day the last 4 days.      [facility] ADD, seizures, asthma, smoker   will ck xray from triage

## 2017-09-24 NOTE — ED PROVIDER NOTES
HPI Comments: Patient is a 23-year-old female who is recently hospitalized for a fall out of the bathroom. She had a right-sided pneumothorax and several rib fractures. She was seen by pulmonary 10 days ago and given a large prescription of oxycodone for her multiple pain complaints. Today she presents with left-sided chest wall pain. States it is worse with movement and worse with pressing on it  She is worried she has a pneumothorax on that side. No significant shortness of breath. No other complaints. Patient asking for pain medications as well as fentanyl patch if possible. Patient is a 65 y.o. female presenting with rib pain. The history is provided by the patient. No  was used. Rib Pain   Pertinent negatives include no fever, no headaches, no sore throat, no cough, no abdominal pain and no leg swelling. Past Medical History:   Diagnosis Date    Alcohol abuse 10/31/2015    Alcohol withdrawal ([facility] [address] 10/31/2015    Asthma     Other ill-defined conditions     lumbar and thoracic scoliosis    Other ill-defined conditions     irregular heart rate    Psychiatric disorder     ADHD    Seizures ([facility] [address]        Past Surgical History:   Procedure Laterality Date    HX GYN      hysterectomy    HX HEENT      sinus surgery. deviated septum.  HX ORTHOPAEDIC      right knee         History reviewed. No pertinent family history. Social History     Social History    Marital status:      Spouse name: N/A    Number of children: N/A    Years of education: N/A     Occupational History    Not on file.      Social History Main Topics    Smoking status: Current Every Day Smoker     Packs/day: 0.25    Smokeless tobacco: Never Used    Alcohol use 0.0 oz/week     0 Standard drinks or equivalent per week    Drug use: No    Sexual activity: Not on file     Other Topics Concern    Not on file     Social History Narrative         ALLERGIES: Review of patient's allergies indicates no known allergies. Review of Systems   Constitutional: Negative for fatigue and fever. HENT: Negative for sore throat. Eyes: Negative for pain. Respiratory: Negative for cough, chest tightness and shortness of breath. Cardiovascular: Negative for leg swelling. Gastrointestinal: Negative for abdominal pain. Genitourinary: Negative for dysuria. Musculoskeletal: Negative for back pain. Chest wall pain   Neurological: Negative for syncope and headaches. Vitals:    09/24/17 1552   BP: 118/79   Pulse: 99   Resp: 17   Temp: 97.9 °F (36.6 °C)   SpO2: 97%   Weight: 54.9 kg (121 lb)   Height: 5' 5\" (1.651 m)            Physical Exam   Constitutional: She is oriented to person, place, and time. She appears well-developed and well-nourished. No distress. HENT:   Head: Normocephalic and atraumatic. Eyes: Conjunctivae and EOM are normal. Pupils are equal, round, and reactive to light. Neck: Normal range of motion. Neck supple. Cardiovascular: Normal rate, regular rhythm and normal heart sounds. Pulmonary/Chest: Effort normal and breath sounds normal. No respiratory distress. She has no wheezes. She has no rales. Abdominal: Soft. She exhibits no distension. There is no tenderness. There is no rebound. Musculoskeletal: Normal range of motion. She exhibits tenderness. She exhibits no edema. Arms:  Neurological: She is alert and oriented to person, place, and time. Skin: Skin is warm and dry. No rash noted. She is not diaphoretic. Psychiatric: She has a normal mood and affect. Her behavior is normal.   Vitals reviewed. MDM  Number of Diagnoses or Management Options  Chest wall pain: new and does not require workup  Diagnosis management comments: X-ray on the impressive. Patient is resting comfortably. 10 days ago she was given 2 weeks worth of oxycodone she states she has run out of.   We'll write for 7 tablets of Robaxin the patient's likely musculoskeletal type pain. Discharged home in stable condition. Return precautions discussed. I discussed the results of all labs, procedures, radiographs, and treatments with the patient and available family. Treatment plan is agreed upon and the patient is ready for discharge. Questions about treatment in the ED and differential diagnosis of presenting condition were answered. Patient was given verbal discharge instructions including, but not limited to, importance of returning to the emergency department for any concern of worsening or continued symptoms. Instructions were given to follow up with a primary care provider or specialist within 1-2 days. Adverse effects of medications, if prescribed, were discussed and patient was advised to refrain from significant physical activity until followed up by primary care physician and to not drive or operate heavy machinery after taking any sedating substances. Amount and/or Complexity of Data Reviewed  Tests in the radiology section of CPT®: ordered and reviewed (Xr Chest Pa Lat    Result Date: 9/24/2017  2 View Chest X-Ray 9/24/2017 4:47 PM INDICATION: Recent rib fractures, left chest pain. COMPARISON: 9/11/2017 FINDINGS: Upright PA and Lateral views are submitted. The cardiac and mediastinal contours are normal. The lungs are well-inflated. Vascularity is normal. On the left there is no consolidation or effusion and only some minimal linear scarring at the medial base. On the right there is a small amount of pleural fluid and atelectasis. No pneumothorax seen. Grossly, the chest wall structures are intact. IMPRESSION: 1. No pneumothorax, lungs well inflated. Slightly increased pleural fluid and atelectasis suggested at the right base now.  Left base clearer than before.      )  Review and summarize past medical records: yes  Independent visualization of images, tracings, or specimens: yes    Risk of Complications, Morbidity, and/or Mortality  Presenting problems: low  Diagnostic procedures: low  Management options: low    Patient Progress  Patient progress: improved    ED Course       Procedures

## 2017-09-25 PROCEDURE — 3331090001 HH PPS REVENUE CREDIT

## 2017-09-25 PROCEDURE — 3331090002 HH PPS REVENUE DEBIT

## 2017-09-26 ENCOUNTER — HOME CARE VISIT (OUTPATIENT)
Dept: SCHEDULING | Facility: HOME HEALTH | Age: 62
End: 2017-09-26
Payer: MEDICARE

## 2017-09-26 VITALS
HEART RATE: 80 BPM | DIASTOLIC BLOOD PRESSURE: 70 MMHG | RESPIRATION RATE: 16 BRPM | TEMPERATURE: 97.7 F | OXYGEN SATURATION: 99 % | SYSTOLIC BLOOD PRESSURE: 120 MMHG

## 2017-09-26 PROCEDURE — 3331090002 HH PPS REVENUE DEBIT

## 2017-09-26 PROCEDURE — G0157 HHC PT ASSISTANT EA 15: HCPCS

## 2017-09-26 PROCEDURE — 3331090001 HH PPS REVENUE CREDIT

## 2017-09-27 PROCEDURE — 3331090001 HH PPS REVENUE CREDIT

## 2017-09-27 PROCEDURE — 3331090002 HH PPS REVENUE DEBIT

## 2017-09-28 ENCOUNTER — HOSPITAL ENCOUNTER (EMERGENCY)
Age: 62
Discharge: HOME | End: 2017-09-28
Attending: EMERGENCY MEDICINE
Payer: MEDICARE

## 2017-09-28 VITALS
SYSTOLIC BLOOD PRESSURE: 146 MMHG | HEART RATE: 92 BPM | DIASTOLIC BLOOD PRESSURE: 93 MMHG | WEIGHT: 120 LBS | RESPIRATION RATE: 20 BRPM | TEMPERATURE: 98.2 F | OXYGEN SATURATION: 99 % | BODY MASS INDEX: 19.99 KG/M2 | HEIGHT: 65 IN

## 2017-09-28 DIAGNOSIS — S22.42XD CLOSED FRACTURE OF MULTIPLE RIBS OF LEFT SIDE WITH ROUTINE HEALING, SUBSEQUENT ENCOUNTER: Primary | ICD-10-CM

## 2017-09-28 PROCEDURE — 99283 EMERGENCY DEPT VISIT LOW MDM: CPT | Performed by: EMERGENCY MEDICINE

## 2017-09-28 PROCEDURE — 3331090002 HH PPS REVENUE DEBIT

## 2017-09-28 PROCEDURE — 3331090001 HH PPS REVENUE CREDIT

## 2017-09-28 RX ORDER — HYDROCODONE BITARTRATE AND ACETAMINOPHEN 5; 325 MG/1; MG/1
1 TABLET ORAL
Qty: 12 TAB | Refills: 0 | Status: SHIPPED | OUTPATIENT
Start: 2017-09-28 | End: 2017-09-28

## 2017-09-28 RX ORDER — DICLOFENAC SODIUM 75 MG/1
75 TABLET, DELAYED RELEASE ORAL 2 TIMES DAILY
Qty: 10 TAB | Refills: 0 | Status: SHIPPED | OUTPATIENT
Start: 2017-09-28 | End: 2017-09-28

## 2017-09-28 RX ORDER — HYDROCODONE BITARTRATE AND ACETAMINOPHEN 5; 325 MG/1; MG/1
1 TABLET ORAL
Qty: 12 TAB | Refills: 0 | Status: SHIPPED | OUTPATIENT
Start: 2017-09-28 | End: 2019-01-02

## 2017-09-28 RX ORDER — DICLOFENAC SODIUM 75 MG/1
75 TABLET, DELAYED RELEASE ORAL 2 TIMES DAILY
Qty: 10 TAB | Refills: 0 | Status: SHIPPED | OUTPATIENT
Start: 2017-09-28 | End: 2019-01-02

## 2017-09-28 NOTE — ED PROVIDER NOTES
HPI Comments: 71-year-old white female offered multiple rib fractures and small pneumothorax approximately 2-1/2 weeks ago. She was scheduled to see primary care today however that appointment was canceled by the primary care physician. She states that she is out of pain medication. She has tried over-the-counter but is still having significant discomfort with breathing. No other complaints and no new injuries. Patient is a 65 y.o. female presenting with medication refill. The history is provided by the patient. Medication Refill   Pertinent negatives include no abdominal pain, no headaches and no shortness of breath. Past Medical History:   Diagnosis Date    Alcohol abuse 10/31/2015    Alcohol withdrawal ([facility] [address] 10/31/2015    Asthma     Other ill-defined conditions     lumbar and thoracic scoliosis    Other ill-defined conditions     irregular heart rate    Psychiatric disorder     ADHD    Seizures ([facility] [address]        Past Surgical History:   Procedure Laterality Date    HX GYN      hysterectomy    HX HEENT      sinus surgery. deviated septum.  HX ORTHOPAEDIC      right knee         No family history on file. Social History     Social History    Marital status:      Spouse name: N/A    Number of children: N/A    Years of education: N/A     Occupational History    Not on file. Social History Main Topics    Smoking status: Current Every Day Smoker     Packs/day: 0.25    Smokeless tobacco: Never Used    Alcohol use 0.0 oz/week     0 Standard drinks or equivalent per week    Drug use: No    Sexual activity: Not on file     Other Topics Concern    Not on file     Social History Narrative         ALLERGIES: Review of patient's allergies indicates no known allergies. Review of Systems   Constitutional: Negative for fever. Respiratory: Negative for shortness of breath. Gastrointestinal: Negative for abdominal pain and vomiting. Neurological: Negative for headaches. Vitals:    09/28/17 1029   BP: 142/88   Pulse: 90   Resp: 26   Temp: 98 °F (36.7 °C)   SpO2: 98%   Weight: 54.4 kg (120 lb)   Height: 5' 5\" (1.651 m)            Physical Exam   Constitutional: She is oriented to person, place, and time. She appears well-developed and well-nourished. No distress. HENT:   Head: Normocephalic and atraumatic. Neck: Normal range of motion. Cardiovascular: Normal rate and regular rhythm. No murmur heard. Pulmonary/Chest: Effort normal and breath sounds normal. She has no wheezes. She exhibits tenderness. Neurological: She is alert and oriented to person, place, and time. Skin: Skin is warm and dry. Psychiatric: She has a normal mood and affect. Nursing note and vitals reviewed. MDM  Number of Diagnoses or Management Options  Diagnosis management comments: I did review the [address] [place] [address] Road in the patient has only received one prescription for fentanyl patches and 1 prescription for oxycodone for this injury. She does not have any other opiate pain medicines documented. Her injury may warrant a few more days of opiate pain control. Also provide full tear which she is advised to try before the opiate pain medication.     Risk of Complications, Morbidity, and/or Mortality  Presenting problems: low  Diagnostic procedures: low  Management options: low      ED Course       Procedures

## 2017-09-28 NOTE — ED TRIAGE NOTES
Pt injured her ribs on the 9th of this month. Pt was here 4 days ago for rib pain. Pt was to have an appointment with a pcp that the hospital set up for her and she received a call saying they could not see her due to two doctors being out sick. Pt states her ribs are still hurting and she is out of her pain meds.

## 2017-09-28 NOTE — DISCHARGE INSTRUCTIONS
Broken Rib: Care Instructions  Your Care Instructions    A broken rib is a crack or break in one of the bones of the rib cage. Breathing can be very painful because the muscles used for breathing pull on the rib. In most cases, a broken rib will heal on its own. You can take pain medicine while the rib mends. Pain relief allows you to take deep breaths. In the past, doctors recommended taping or wrapping broken ribs. This is no longer done because taping makes it hard for you to take deep breaths. You need to take deep breaths at least once an hour to prevent pneumonia or a partial collapse of a lung. Your rib will heal in about 6 weeks. You heal best when you take good care of yourself. Eat a variety of healthy foods, and don't smoke. Follow-up care is a key part of your treatment and safety. Be sure to make and go to all appointments, and call your doctor if you are having problems. It's also a good idea to know your test results and keep a list of the medicines you take. How can you care for yourself at home? · Be safe with medicines. Read and follow all instructions on the label. ¨ If the doctor gave you a prescription medicine for pain, take it as prescribed. ¨ If you are not taking a prescription pain medicine, ask your doctor if you can take an over-the-counter medicine. · Even if it hurts, cough or take the deepest breath you can at least once every hour. This will get air deeply into your lungs and reduce your chance of getting pneumonia or a partial collapse of a lung. Hold a pillow against your chest to make this less painful. · Put ice or a cold pack on the area for 10 to 20 minutes at a time. Put a thin cloth between the ice and your skin. When should you call for help? Call 911 anytime you think you may need emergency care. For example, call if:  · You have severe trouble breathing. Call your doctor now or seek immediate medical care if:  · You have some trouble breathing.   · You have a fever. · You have a new or worse cough. Watch closely for changes in your health, and be sure to contact your doctor if:  · You have pain even after taking your medicine. · You do not get better as expected. Where can you learn more? Go to http://sanchez-mila.info/. Enter M135 in the search box to learn more about \"Broken Rib: Care Instructions. \"  Current as of: March 21, 2017  Content Version: 11.3  © 2159-1628 [employer]. Care instructions adapted under license by [employer] (which disclaims liability or warranty for this information). If you have questions about a medical condition or this instruction, always ask your healthcare professional. [address] any warranty or liability for your use of this information.

## 2017-09-28 NOTE — ED NOTES
I have reviewed discharge instructions with the patient. The patient verbalized understanding. Patient left ED via Discharge Method: ambulatory to Home with friend to pick pt up. Opportunity for questions and clarification provided. Patient given 2 scripts.

## 2017-09-29 ENCOUNTER — HOME CARE VISIT (OUTPATIENT)
Dept: SCHEDULING | Facility: HOME HEALTH | Age: 62
End: 2017-09-29
Payer: MEDICARE

## 2017-09-29 VITALS
SYSTOLIC BLOOD PRESSURE: 120 MMHG | OXYGEN SATURATION: 98 % | TEMPERATURE: 97.8 F | DIASTOLIC BLOOD PRESSURE: 80 MMHG | HEART RATE: 80 BPM | RESPIRATION RATE: 16 BRPM

## 2017-09-29 PROCEDURE — 3331090001 HH PPS REVENUE CREDIT

## 2017-09-29 PROCEDURE — 3331090002 HH PPS REVENUE DEBIT

## 2017-09-29 PROCEDURE — G0157 HHC PT ASSISTANT EA 15: HCPCS

## 2017-09-30 PROCEDURE — 3331090002 HH PPS REVENUE DEBIT

## 2017-09-30 PROCEDURE — 3331090001 HH PPS REVENUE CREDIT

## 2017-10-01 PROCEDURE — 3331090001 HH PPS REVENUE CREDIT

## 2017-10-01 PROCEDURE — 3331090002 HH PPS REVENUE DEBIT

## 2017-10-02 ENCOUNTER — PATIENT OUTREACH (OUTPATIENT)
Dept: CASE MANAGEMENT | Age: 62
End: 2017-10-02

## 2017-10-02 ENCOUNTER — HOME CARE VISIT (OUTPATIENT)
Dept: SCHEDULING | Facility: HOME HEALTH | Age: 62
End: 2017-10-02
Payer: MEDICARE

## 2017-10-02 VITALS
SYSTOLIC BLOOD PRESSURE: 150 MMHG | DIASTOLIC BLOOD PRESSURE: 90 MMHG | HEART RATE: 88 BPM | TEMPERATURE: 98.4 F | RESPIRATION RATE: 16 BRPM | OXYGEN SATURATION: 99 %

## 2017-10-02 PROCEDURE — G0157 HHC PT ASSISTANT EA 15: HCPCS

## 2017-10-02 PROCEDURE — 3331090002 HH PPS REVENUE DEBIT

## 2017-10-02 PROCEDURE — 3331090001 HH PPS REVENUE CREDIT

## 2017-10-02 NOTE — PROGRESS NOTES
Received referral for SW from [facility]. Miguelito Cash RN to verify order. This order was meant for [place] [facility]. She will resend. No case opened.

## 2017-10-03 PROCEDURE — 3331090001 HH PPS REVENUE CREDIT

## 2017-10-03 PROCEDURE — 3331090002 HH PPS REVENUE DEBIT

## 2017-10-04 ENCOUNTER — HOME CARE VISIT (OUTPATIENT)
Dept: HOME HEALTH SERVICES | Facility: HOME HEALTH | Age: 62
End: 2017-10-04
Payer: MEDICARE

## 2017-10-04 PROCEDURE — 3331090001 HH PPS REVENUE CREDIT

## 2017-10-04 PROCEDURE — 3331090002 HH PPS REVENUE DEBIT

## 2017-10-05 ENCOUNTER — HOME CARE VISIT (OUTPATIENT)
Dept: HOME HEALTH SERVICES | Facility: HOME HEALTH | Age: 62
End: 2017-10-05
Payer: MEDICARE

## 2017-10-05 PROCEDURE — 3331090001 HH PPS REVENUE CREDIT

## 2017-10-05 PROCEDURE — 3331090002 HH PPS REVENUE DEBIT

## 2017-10-06 ENCOUNTER — HOME CARE VISIT (OUTPATIENT)
Dept: SCHEDULING | Facility: HOME HEALTH | Age: 62
End: 2017-10-06
Payer: MEDICARE

## 2017-10-06 VITALS
TEMPERATURE: 97.6 F | SYSTOLIC BLOOD PRESSURE: 110 MMHG | HEART RATE: 70 BPM | RESPIRATION RATE: 17 BRPM | DIASTOLIC BLOOD PRESSURE: 70 MMHG

## 2017-10-06 PROCEDURE — 3331090002 HH PPS REVENUE DEBIT

## 2017-10-06 PROCEDURE — G0155 HHCP-SVS OF CSW,EA 15 MIN: HCPCS

## 2017-10-06 PROCEDURE — G0157 HHC PT ASSISTANT EA 15: HCPCS

## 2017-10-06 PROCEDURE — 3331090001 HH PPS REVENUE CREDIT

## 2017-10-07 PROCEDURE — 3331090001 HH PPS REVENUE CREDIT

## 2017-10-07 PROCEDURE — 3331090002 HH PPS REVENUE DEBIT

## 2017-10-08 PROCEDURE — 3331090001 HH PPS REVENUE CREDIT

## 2017-10-08 PROCEDURE — 3331090002 HH PPS REVENUE DEBIT

## 2017-10-09 PROCEDURE — 3331090001 HH PPS REVENUE CREDIT

## 2017-10-09 PROCEDURE — 3331090002 HH PPS REVENUE DEBIT

## 2017-10-10 ENCOUNTER — HOME CARE VISIT (OUTPATIENT)
Dept: SCHEDULING | Facility: HOME HEALTH | Age: 62
End: 2017-10-10
Payer: MEDICARE

## 2017-10-10 VITALS
TEMPERATURE: 98.2 F | HEART RATE: 70 BPM | RESPIRATION RATE: 17 BRPM | SYSTOLIC BLOOD PRESSURE: 120 MMHG | DIASTOLIC BLOOD PRESSURE: 80 MMHG

## 2017-10-10 PROCEDURE — 3331090001 HH PPS REVENUE CREDIT

## 2017-10-10 PROCEDURE — 3331090002 HH PPS REVENUE DEBIT

## 2017-10-10 PROCEDURE — G0157 HHC PT ASSISTANT EA 15: HCPCS

## 2017-10-11 PROCEDURE — 3331090002 HH PPS REVENUE DEBIT

## 2017-10-11 PROCEDURE — 3331090001 HH PPS REVENUE CREDIT

## 2017-10-12 ENCOUNTER — HOME CARE VISIT (OUTPATIENT)
Dept: HOME HEALTH SERVICES | Facility: HOME HEALTH | Age: 62
End: 2017-10-12
Payer: MEDICARE

## 2017-10-12 PROCEDURE — 3331090003 HH PPS REVENUE ADJ

## 2017-10-12 PROCEDURE — 3331090002 HH PPS REVENUE DEBIT

## 2017-10-12 PROCEDURE — 3331090001 HH PPS REVENUE CREDIT

## 2017-10-13 PROCEDURE — 3331090001 HH PPS REVENUE CREDIT

## 2017-10-13 PROCEDURE — 3331090002 HH PPS REVENUE DEBIT

## 2017-10-14 PROCEDURE — 3331090001 HH PPS REVENUE CREDIT

## 2017-10-14 PROCEDURE — 3331090002 HH PPS REVENUE DEBIT

## 2017-10-15 PROCEDURE — 3331090001 HH PPS REVENUE CREDIT

## 2017-10-15 PROCEDURE — 3331090002 HH PPS REVENUE DEBIT

## 2017-10-16 ENCOUNTER — HOME CARE VISIT (OUTPATIENT)
Dept: HOME HEALTH SERVICES | Facility: HOME HEALTH | Age: 62
End: 2017-10-16
Payer: MEDICARE

## 2017-10-16 PROCEDURE — 3331090001 HH PPS REVENUE CREDIT

## 2017-10-16 PROCEDURE — 3331090002 HH PPS REVENUE DEBIT

## 2017-10-17 PROCEDURE — 3331090002 HH PPS REVENUE DEBIT

## 2017-10-17 PROCEDURE — 3331090001 HH PPS REVENUE CREDIT

## 2017-10-18 PROCEDURE — 3331090001 HH PPS REVENUE CREDIT

## 2017-10-18 PROCEDURE — 3331090002 HH PPS REVENUE DEBIT

## 2017-10-19 PROCEDURE — 3331090001 HH PPS REVENUE CREDIT

## 2017-10-19 PROCEDURE — 3331090002 HH PPS REVENUE DEBIT

## 2017-10-20 PROCEDURE — 3331090002 HH PPS REVENUE DEBIT

## 2017-10-20 PROCEDURE — 3331090001 HH PPS REVENUE CREDIT

## 2017-10-21 PROCEDURE — 3331090002 HH PPS REVENUE DEBIT

## 2017-10-21 PROCEDURE — 3331090001 HH PPS REVENUE CREDIT

## 2017-10-22 PROCEDURE — 3331090002 HH PPS REVENUE DEBIT

## 2017-10-22 PROCEDURE — 3331090001 HH PPS REVENUE CREDIT

## 2017-10-23 PROCEDURE — 3331090002 HH PPS REVENUE DEBIT

## 2017-10-23 PROCEDURE — 3331090001 HH PPS REVENUE CREDIT

## 2017-10-24 PROCEDURE — 3331090001 HH PPS REVENUE CREDIT

## 2017-10-24 PROCEDURE — 3331090002 HH PPS REVENUE DEBIT

## 2017-10-25 PROCEDURE — 3331090002 HH PPS REVENUE DEBIT

## 2017-10-25 PROCEDURE — 3331090001 HH PPS REVENUE CREDIT

## 2017-10-26 PROCEDURE — 3331090002 HH PPS REVENUE DEBIT

## 2017-10-26 PROCEDURE — 3331090001 HH PPS REVENUE CREDIT

## 2017-10-27 PROCEDURE — 3331090002 HH PPS REVENUE DEBIT

## 2017-10-27 PROCEDURE — 3331090001 HH PPS REVENUE CREDIT

## 2017-10-28 PROCEDURE — 3331090002 HH PPS REVENUE DEBIT

## 2017-10-28 PROCEDURE — 3331090001 HH PPS REVENUE CREDIT

## 2017-10-29 PROCEDURE — 3331090002 HH PPS REVENUE DEBIT

## 2017-10-29 PROCEDURE — 3331090001 HH PPS REVENUE CREDIT

## 2017-10-30 PROCEDURE — 3331090002 HH PPS REVENUE DEBIT

## 2017-10-30 PROCEDURE — 3331090001 HH PPS REVENUE CREDIT

## 2017-10-31 PROCEDURE — 3331090002 HH PPS REVENUE DEBIT

## 2017-10-31 PROCEDURE — 3331090001 HH PPS REVENUE CREDIT

## 2017-11-01 PROCEDURE — 3331090002 HH PPS REVENUE DEBIT

## 2017-11-01 PROCEDURE — 3331090001 HH PPS REVENUE CREDIT

## 2017-11-02 PROCEDURE — 3331090001 HH PPS REVENUE CREDIT

## 2017-11-02 PROCEDURE — 3331090002 HH PPS REVENUE DEBIT

## 2017-11-03 PROCEDURE — 3331090001 HH PPS REVENUE CREDIT

## 2017-11-03 PROCEDURE — 3331090002 HH PPS REVENUE DEBIT

## 2017-11-04 PROCEDURE — 3331090002 HH PPS REVENUE DEBIT

## 2017-11-04 PROCEDURE — 3331090001 HH PPS REVENUE CREDIT

## 2017-11-05 PROCEDURE — 3331090001 HH PPS REVENUE CREDIT

## 2017-11-05 PROCEDURE — 3331090002 HH PPS REVENUE DEBIT

## 2017-11-06 PROCEDURE — 3331090002 HH PPS REVENUE DEBIT

## 2017-11-06 PROCEDURE — 3331090001 HH PPS REVENUE CREDIT

## 2017-11-07 PROCEDURE — 3331090001 HH PPS REVENUE CREDIT

## 2017-11-07 PROCEDURE — 3331090002 HH PPS REVENUE DEBIT

## 2017-11-08 PROCEDURE — 3331090002 HH PPS REVENUE DEBIT

## 2017-11-08 PROCEDURE — 3331090001 HH PPS REVENUE CREDIT

## 2017-11-09 PROCEDURE — 3331090001 HH PPS REVENUE CREDIT

## 2017-11-09 PROCEDURE — 3331090002 HH PPS REVENUE DEBIT

## 2017-11-10 PROCEDURE — 3331090001 HH PPS REVENUE CREDIT

## 2017-11-10 PROCEDURE — 3331090002 HH PPS REVENUE DEBIT

## 2017-11-11 PROCEDURE — 3331090002 HH PPS REVENUE DEBIT

## 2017-11-11 PROCEDURE — 3331090001 HH PPS REVENUE CREDIT

## 2017-11-12 PROCEDURE — 3331090001 HH PPS REVENUE CREDIT

## 2017-11-12 PROCEDURE — 3331090002 HH PPS REVENUE DEBIT

## 2017-11-13 PROCEDURE — 3331090002 HH PPS REVENUE DEBIT

## 2017-11-13 PROCEDURE — 3331090003 HH PPS REVENUE ADJ

## 2017-11-13 PROCEDURE — 3331090001 HH PPS REVENUE CREDIT

## 2018-07-04 ENCOUNTER — HOSPITAL ENCOUNTER (EMERGENCY)
Age: 63
Discharge: LEFT BEFORE DISCHARGE | End: 2018-07-04
Attending: EMERGENCY MEDICINE
Payer: MEDICARE

## 2018-07-04 VITALS
BODY MASS INDEX: 19.16 KG/M2 | HEIGHT: 65 IN | WEIGHT: 115 LBS | TEMPERATURE: 98.2 F | DIASTOLIC BLOOD PRESSURE: 89 MMHG | RESPIRATION RATE: 18 BRPM | HEART RATE: 94 BPM | OXYGEN SATURATION: 99 % | SYSTOLIC BLOOD PRESSURE: 138 MMHG

## 2018-07-04 PROCEDURE — 75810000275 HC EMERGENCY DEPT VISIT NO LEVEL OF CARE: Performed by: EMERGENCY MEDICINE

## 2018-07-04 NOTE — ED TRIAGE NOTES
Pt arrived via EMS from home. Pt fell backwards off ledge about 6'. Pt has hematoma to back of head. Pt c/o bilateral wrist burning pain from hand that radiates up to shoulder.

## 2018-07-04 NOTE — ED NOTES
patient walked up to registration and told them that she was supposed to be at another facility and that she needed to be taken out of the system.

## 2018-12-30 ENCOUNTER — HOSPITAL ENCOUNTER (INPATIENT)
Age: 63
LOS: 3 days | Discharge: LEFT BEFORE DISCHARGE | DRG: 071 | End: 2019-01-02
Attending: EMERGENCY MEDICINE | Admitting: INTERNAL MEDICINE
Payer: MEDICARE

## 2018-12-30 ENCOUNTER — APPOINTMENT (OUTPATIENT)
Dept: GENERAL RADIOLOGY | Age: 63
DRG: 071 | End: 2018-12-30
Attending: NURSE PRACTITIONER
Payer: MEDICARE

## 2018-12-30 ENCOUNTER — APPOINTMENT (OUTPATIENT)
Dept: CT IMAGING | Age: 63
DRG: 071 | End: 2018-12-30
Attending: EMERGENCY MEDICINE
Payer: MEDICARE

## 2018-12-30 ENCOUNTER — APPOINTMENT (OUTPATIENT)
Dept: CT IMAGING | Age: 63
DRG: 071 | End: 2018-12-30
Attending: NURSE PRACTITIONER
Payer: MEDICARE

## 2018-12-30 DIAGNOSIS — G93.40 ACUTE ENCEPHALOPATHY: Primary | ICD-10-CM

## 2018-12-30 DIAGNOSIS — N17.9 ACUTE KIDNEY INJURY (HCC): ICD-10-CM

## 2018-12-30 LAB
ABO + RH BLD: NORMAL
ALBUMIN SERPL-MCNC: 4.4 G/DL (ref 3.2–4.6)
ALBUMIN/GLOB SERPL: 1 {RATIO} (ref 1.2–3.5)
ALP SERPL-CCNC: 122 U/L (ref 50–136)
ALT SERPL-CCNC: 29 U/L (ref 12–65)
AMMONIA PLAS-SCNC: 15 UMOL/L (ref 11–32)
AMORPH CRY URNS QL MICRO: ABNORMAL
AMPHET UR QL SCN: NEGATIVE
ANION GAP SERPL CALC-SCNC: 9 MMOL/L (ref 7–16)
APPEARANCE UR: ABNORMAL
AST SERPL-CCNC: 60 U/L (ref 15–37)
BACTERIA URNS QL MICRO: 0 /HPF
BACTERIA URNS QL MICRO: ABNORMAL /HPF
BARBITURATES UR QL SCN: NEGATIVE
BASOPHILS # BLD: 0 K/UL (ref 0–0.2)
BASOPHILS NFR BLD: 0 % (ref 0–2)
BENZODIAZ UR QL: NEGATIVE
BILIRUB SERPL-MCNC: 0.8 MG/DL (ref 0.2–1.1)
BILIRUB UR QL: ABNORMAL
BLOOD GROUP ANTIBODIES SERPL: NORMAL
BUN SERPL-MCNC: 32 MG/DL (ref 8–23)
CALCIUM SERPL-MCNC: 9.6 MG/DL (ref 8.3–10.4)
CANNABINOIDS UR QL SCN: NEGATIVE
CASTS URNS QL MICRO: 0 /LPF
CASTS URNS QL MICRO: NORMAL /LPF
CHLORIDE SERPL-SCNC: 113 MMOL/L (ref 98–107)
CO2 SERPL-SCNC: 28 MMOL/L (ref 21–32)
COCAINE UR QL SCN: NEGATIVE
COLOR UR: ABNORMAL
CREAT SERPL-MCNC: 1.8 MG/DL (ref 0.6–1)
CRYSTALS URNS QL MICRO: 0 /LPF
DIFFERENTIAL METHOD BLD: ABNORMAL
EOSINOPHIL # BLD: 0 K/UL (ref 0–0.8)
EOSINOPHIL NFR BLD: 0 % (ref 0.5–7.8)
EPI CELLS #/AREA URNS HPF: ABNORMAL /HPF
EPI CELLS #/AREA URNS HPF: NORMAL /HPF
ERYTHROCYTE [DISTWIDTH] IN BLOOD BY AUTOMATED COUNT: 12.9 % (ref 11.9–14.6)
ETHANOL SERPL-MCNC: <3 MG/DL
GLOBULIN SER CALC-MCNC: 4.2 G/DL (ref 2.3–3.5)
GLUCOSE SERPL-MCNC: 112 MG/DL (ref 65–100)
GLUCOSE UR STRIP.AUTO-MCNC: NEGATIVE MG/DL
HCT VFR BLD AUTO: 54.8 % (ref 35.8–46.3)
HGB BLD-MCNC: 18.1 G/DL (ref 11.7–15.4)
HGB UR QL STRIP: ABNORMAL
IMM GRANULOCYTES # BLD: 0.1 K/UL (ref 0–0.5)
IMM GRANULOCYTES NFR BLD AUTO: 1 % (ref 0–5)
KETONES UR QL STRIP.AUTO: 40 MG/DL
LACTATE BLD-SCNC: 1.72 MMOL/L (ref 0.5–1.9)
LEUKOCYTE ESTERASE UR QL STRIP.AUTO: ABNORMAL
LIPASE SERPL-CCNC: 138 U/L (ref 73–393)
LYMPHOCYTES # BLD: 1.3 K/UL (ref 0.5–4.6)
LYMPHOCYTES NFR BLD: 11 % (ref 13–44)
MAGNESIUM SERPL-MCNC: 2.3 MG/DL (ref 1.8–2.4)
MCH RBC QN AUTO: 30.7 PG (ref 26.1–32.9)
MCHC RBC AUTO-ENTMCNC: 33 G/DL (ref 31.4–35)
MCV RBC AUTO: 92.9 FL (ref 79.6–97.8)
METHADONE UR QL: NEGATIVE
MONOCYTES # BLD: 1 K/UL (ref 0.1–1.3)
MONOCYTES NFR BLD: 9 % (ref 4–12)
MUCOUS THREADS URNS QL MICRO: 0 /LPF
NEUTS SEG # BLD: 9.5 K/UL (ref 1.7–8.2)
NEUTS SEG NFR BLD: 80 % (ref 43–78)
NITRITE UR QL STRIP.AUTO: NEGATIVE
NRBC # BLD: 0 K/UL (ref 0–0.2)
OPIATES UR QL: NEGATIVE
PCP UR QL: NEGATIVE
PH UR STRIP: 6 [PH] (ref 5–9)
PLATELET # BLD AUTO: 380 K/UL (ref 150–450)
PMV BLD AUTO: 10.2 FL (ref 9.4–12.3)
POTASSIUM SERPL-SCNC: 3.7 MMOL/L (ref 3.5–5.1)
PROT SERPL-MCNC: 8.6 G/DL (ref 6.3–8.2)
PROT UR STRIP-MCNC: 100 MG/DL
RBC # BLD AUTO: 5.9 M/UL (ref 4.05–5.2)
RBC #/AREA URNS HPF: ABNORMAL /HPF
RBC #/AREA URNS HPF: NORMAL /HPF
SODIUM SERPL-SCNC: 150 MMOL/L (ref 136–145)
SP GR UR REFRACTOMETRY: 1.03 (ref 1–1.02)
SPECIMEN EXP DATE BLD: NORMAL
UROBILINOGEN UR QL STRIP.AUTO: 0.2 EU/DL (ref 0.2–1)
WBC # BLD AUTO: 11.8 K/UL (ref 4.3–11.1)
WBC URNS QL MICRO: ABNORMAL /HPF
WBC URNS QL MICRO: NORMAL /HPF

## 2018-12-30 PROCEDURE — 70450 CT HEAD/BRAIN W/O DYE: CPT

## 2018-12-30 PROCEDURE — 74177 CT ABD & PELVIS W/CONTRAST: CPT

## 2018-12-30 PROCEDURE — 74011250636 HC RX REV CODE- 250/636: Performed by: EMERGENCY MEDICINE

## 2018-12-30 PROCEDURE — 85025 COMPLETE CBC W/AUTO DIFF WBC: CPT

## 2018-12-30 PROCEDURE — 77030019605

## 2018-12-30 PROCEDURE — 80053 COMPREHEN METABOLIC PANEL: CPT

## 2018-12-30 PROCEDURE — 83735 ASSAY OF MAGNESIUM: CPT

## 2018-12-30 PROCEDURE — 86900 BLOOD TYPING SEROLOGIC ABO: CPT

## 2018-12-30 PROCEDURE — 81001 URINALYSIS AUTO W/SCOPE: CPT

## 2018-12-30 PROCEDURE — 65660000000 HC RM CCU STEPDOWN

## 2018-12-30 PROCEDURE — 74011000250 HC RX REV CODE- 250: Performed by: NURSE PRACTITIONER

## 2018-12-30 PROCEDURE — 83690 ASSAY OF LIPASE: CPT

## 2018-12-30 PROCEDURE — 73030 X-RAY EXAM OF SHOULDER: CPT

## 2018-12-30 PROCEDURE — 93005 ELECTROCARDIOGRAM TRACING: CPT | Performed by: NURSE PRACTITIONER

## 2018-12-30 PROCEDURE — 87086 URINE CULTURE/COLONY COUNT: CPT

## 2018-12-30 PROCEDURE — 96360 HYDRATION IV INFUSION INIT: CPT | Performed by: EMERGENCY MEDICINE

## 2018-12-30 PROCEDURE — 83605 ASSAY OF LACTIC ACID: CPT

## 2018-12-30 PROCEDURE — 99285 EMERGENCY DEPT VISIT HI MDM: CPT | Performed by: EMERGENCY MEDICINE

## 2018-12-30 PROCEDURE — 74011250636 HC RX REV CODE- 250/636: Performed by: NURSE PRACTITIONER

## 2018-12-30 PROCEDURE — 80307 DRUG TEST PRSMV CHEM ANLYZR: CPT

## 2018-12-30 PROCEDURE — 74011636320 HC RX REV CODE- 636/320: Performed by: NURSE PRACTITIONER

## 2018-12-30 PROCEDURE — 36415 COLL VENOUS BLD VENIPUNCTURE: CPT

## 2018-12-30 PROCEDURE — 82140 ASSAY OF AMMONIA: CPT

## 2018-12-30 PROCEDURE — 73560 X-RAY EXAM OF KNEE 1 OR 2: CPT

## 2018-12-30 PROCEDURE — 71045 X-RAY EXAM CHEST 1 VIEW: CPT

## 2018-12-30 PROCEDURE — 73522 X-RAY EXAM HIPS BI 3-4 VIEWS: CPT

## 2018-12-30 PROCEDURE — 81015 MICROSCOPIC EXAM OF URINE: CPT

## 2018-12-30 PROCEDURE — 77030020263 HC SOL INJ SOD CL0.9% LFCR 1000ML

## 2018-12-30 PROCEDURE — 74011000258 HC RX REV CODE- 258: Performed by: NURSE PRACTITIONER

## 2018-12-30 RX ORDER — SODIUM CHLORIDE 0.9 % (FLUSH) 0.9 %
5-10 SYRINGE (ML) INJECTION EVERY 8 HOURS
Status: DISCONTINUED | OUTPATIENT
Start: 2018-12-30 | End: 2019-01-02 | Stop reason: HOSPADM

## 2018-12-30 RX ORDER — MORPHINE SULFATE 2 MG/ML
2 INJECTION, SOLUTION INTRAMUSCULAR; INTRAVENOUS
Status: DISCONTINUED | OUTPATIENT
Start: 2018-12-30 | End: 2019-01-02

## 2018-12-30 RX ORDER — SODIUM CHLORIDE 0.9 % (FLUSH) 0.9 %
10 SYRINGE (ML) INJECTION
Status: COMPLETED | OUTPATIENT
Start: 2018-12-30 | End: 2018-12-30

## 2018-12-30 RX ORDER — ONDANSETRON 2 MG/ML
4 INJECTION INTRAMUSCULAR; INTRAVENOUS
Status: DISCONTINUED | OUTPATIENT
Start: 2018-12-30 | End: 2019-01-02 | Stop reason: HOSPADM

## 2018-12-30 RX ORDER — SODIUM CHLORIDE 0.9 % (FLUSH) 0.9 %
5-10 SYRINGE (ML) INJECTION AS NEEDED
Status: DISCONTINUED | OUTPATIENT
Start: 2018-12-30 | End: 2019-01-02 | Stop reason: HOSPADM

## 2018-12-30 RX ORDER — LORAZEPAM 2 MG/ML
1 INJECTION, SOLUTION INTRAMUSCULAR; INTRAVENOUS
Status: DISCONTINUED | OUTPATIENT
Start: 2018-12-30 | End: 2019-01-02

## 2018-12-30 RX ORDER — SODIUM CHLORIDE 9 MG/ML
100 INJECTION, SOLUTION INTRAVENOUS CONTINUOUS
Status: DISCONTINUED | OUTPATIENT
Start: 2018-12-30 | End: 2019-01-01

## 2018-12-30 RX ORDER — SODIUM CHLORIDE 9 MG/ML
3 INJECTION, SOLUTION INTRAVENOUS ONCE
Status: DISPENSED | OUTPATIENT
Start: 2018-12-30 | End: 2018-12-31

## 2018-12-30 RX ORDER — SODIUM CHLORIDE 9 MG/ML
3 INJECTION, SOLUTION INTRAVENOUS ONCE
Status: COMPLETED | OUTPATIENT
Start: 2018-12-30 | End: 2018-12-31

## 2018-12-30 RX ORDER — SODIUM CHLORIDE 9 MG/ML
1 INJECTION, SOLUTION INTRAVENOUS AS NEEDED
Status: DISPENSED | OUTPATIENT
Start: 2018-12-30 | End: 2018-12-31

## 2018-12-30 RX ORDER — NALOXONE HYDROCHLORIDE 0.4 MG/ML
0.4 INJECTION, SOLUTION INTRAMUSCULAR; INTRAVENOUS; SUBCUTANEOUS AS NEEDED
Status: DISCONTINUED | OUTPATIENT
Start: 2018-12-30 | End: 2019-01-02 | Stop reason: HOSPADM

## 2018-12-30 RX ADMIN — SODIUM CHLORIDE 3 ML/KG/HR: 900 INJECTION, SOLUTION INTRAVENOUS at 18:45

## 2018-12-30 RX ADMIN — DIATRIZOATE MEGLUMINE AND DIATRIZOATE SODIUM 15 ML: 660; 100 LIQUID ORAL; RECTAL at 20:41

## 2018-12-30 RX ADMIN — SODIUM CHLORIDE 1000 ML: 900 INJECTION, SOLUTION INTRAVENOUS at 13:13

## 2018-12-30 RX ADMIN — CEFTRIAXONE SODIUM 1 G: 1 INJECTION, POWDER, FOR SOLUTION INTRAMUSCULAR; INTRAVENOUS at 18:28

## 2018-12-30 RX ADMIN — LORAZEPAM 1 MG: 2 INJECTION, SOLUTION INTRAMUSCULAR; INTRAVENOUS at 22:37

## 2018-12-30 RX ADMIN — SODIUM CHLORIDE 100 ML: 900 INJECTION, SOLUTION INTRAVENOUS at 21:40

## 2018-12-30 RX ADMIN — IOPAMIDOL 100 ML: 755 INJECTION, SOLUTION INTRAVENOUS at 21:39

## 2018-12-30 RX ADMIN — THIAMINE HYDROCHLORIDE: 100 INJECTION, SOLUTION INTRAMUSCULAR; INTRAVENOUS at 18:33

## 2018-12-30 RX ADMIN — Medication 10 ML: at 18:21

## 2018-12-30 RX ADMIN — MORPHINE SULFATE 2 MG: 2 INJECTION, SOLUTION INTRAMUSCULAR; INTRAVENOUS at 20:35

## 2018-12-30 RX ADMIN — Medication 10 ML: at 21:01

## 2018-12-30 RX ADMIN — SODIUM CHLORIDE 100 ML/HR: 900 INJECTION, SOLUTION INTRAVENOUS at 18:18

## 2018-12-30 RX ADMIN — Medication 10 ML: at 21:40

## 2018-12-30 NOTE — ED TRIAGE NOTES
Patient arrived from home via EMS. EMS states patient with n/v/d x 2 days. Patient states bright red blood in vomit and stool. Patient alert and oriented but lethargic. Hx HTN but does not take medication. Friends called EMS after finding patient on couch. EMS gave 4mg zofran en route and patient states helped with nausea. Patient currently in bed. In no acute distress.

## 2018-12-30 NOTE — PROGRESS NOTES
Skin assessment shows both hips with redness that does not appear to janessa. [place] of buttocks with redness, above each crest with redness and does not appear to makenzie. Both shoulders with redness that does not appear to makenzie. Tender to touch. Backs of upper arms, tricep areas with redness, tender. Both thighs with healing areas of scabs and old scars pt states 'i'm a ' healing dry skin/scab on bridge of nose. PIV in LUE-2 IVs in E with 1 PIV in E. With bernard, RN.

## 2018-12-30 NOTE — H&P
Hospitalist H&P Note Admit Date:  2018 12:43 PM  
Name:  Joeseph Libman Age:  59 y.o. 
:  1955 MRN:  [id number] PCP:  None Treatment Team: Attending Provider: Lory Richardson NP 
 
HPI:  
Patient history was obtained from the ER provider prior to seeing the patient. 59year old CF presents to the ED via EMS for AMS. The patient is alone in the room during my exam and she tells me that her friends called EMS after being unable to reach her via phone and in person for the past week. She states living in an apartment with her cat. She states \"binge drinking\" for what she thinks may be the past week but can only recall events 2 weeks ago. She further states that she can go long periods without drinking and thinks the last binge was \"months ago\". Ms. Jhon Denton is noted to not remember events recently or even in the past other than a bar she likes to go to. She thought she was in a  Hospital in [place] and did not know what day it was. She also states having N/V/D for an unknown amount of time with epigastric pain she describes as \"sharp\". She believes the pain began a few days ago but is not absolutely sure. She also states not eating or drinking for the past several days and  Unable to ambulate due to weakness. ED course: WBC with slight elevation 11.8, Ammonia level normal 15, lactic acid 1.72, H&H 18.1/54. 8. Her urine shows moderate blood with moderate leukocyte esterase, 2+ bacteria but also  epithelial cells. 40 ketones and small amount bilirubin. ETOH level less than 3 and UDS negative. Glucose 112. CT of head benign. BUN/Creatinine 32/1. 80. Sodium 150. She is noted hemocult positive and states having dark stools over the past few days although this may be longer given her mentation. She does deny blood in her vomitus. With her exam she is noted to have  Contusions to bilateral posterior shoulders, bilateral hips, and contusions to entire lumbar region. Contusions also noted to bilateral knees. She is able to move extremities but states being \"sore\". 10 systems reviewed and negative except as noted in HPI. Past Medical History:  
Diagnosis Date  Alcohol abuse 10/31/2015  Alcohol withdrawal ([facility] [address] 10/31/2015  Asthma  Other ill-defined conditions(799.89)   
 lumbar and thoracic scoliosis  Other ill-defined conditions(799.89) irregular heart rate  Psychiatric disorder ADHD  Seizures ([facility] [address] Past Surgical History:  
Procedure Laterality Date  HX GYN    
 hysterectomy  HX HEENT    
 sinus surgery. deviated septum.  HX ORTHOPAEDIC    
 right knee No Known Allergies Social History Tobacco Use  Smoking status: Current Every Day Smoker Packs/day: 0.25  Smokeless tobacco: Never Used Substance Use Topics  Alcohol use: No  
  Alcohol/week: 0.0 oz No family history on file. Immunization History Administered Date(s) Administered  Influenza Vaccine 10/23/2013, 07/01/2014, 10/06/2017  Influenza Vaccine Whole 02/06/2007  TB Skin Test (PPD) Intradermal 10/31/2015, 09/10/2017  TD Vaccine 02/06/2004  ZZZ-RETIRED (DO NOT USE) Pneumococcal Vaccine (Unspecified Type) 02/06/2007 PTA Medications: 
Prior to Admission Medications Prescriptions Last Dose Informant Patient Reported? Taking? HYDROcodone-acetaminophen (NORCO) 5-325 mg per tablet   No No  
Sig: Take 1 Tab by mouth every eight (8) hours as needed for Pain. Max Daily Amount: 3 Tabs. LORazepam (ATIVAN) 1 mg tablet   No No  
Sig: Take 1 Tab by mouth every six (6) hours as needed for Anxiety. Max Daily Amount: 4 mg.  
diclofenac EC (VOLTAREN) 75 mg EC tablet   No No  
Sig: Take 1 Tab by mouth two (2) times a day.  
gabapentin (NEURONTIN) 400 mg capsule   Yes No  
Sig: Take 400 mg by mouth three (3) times daily. ibuprofen (MOTRIN) 600 mg tablet   No No  
Sig: Take 1 Tab by mouth three (3) times daily. oxyCODONE IR (ROXICODONE) 10 mg tab immediate release tablet   No No  
Sig: Take 1 Tab by mouth every four (4) hours as needed. Max Daily Amount: 60 mg.  
pantoprazole (PROTONIX) 40 mg tablet   No No  
Sig: Take 1 Tab by mouth Daily (before breakfast). Indications: while taking high dose ibuprofen for rib fractures  
senna-docusate (PERICOLACE) 8.6-50 mg per tablet   No No  
Sig: Take 1 Tab by mouth two (2) times a day. Facility-Administered Medications: None Objective:  
 
Patient Vitals for the past 24 hrs: 
 Temp Pulse Resp BP SpO2  
12/30/18 1631    142/83 99 % 12/30/18 1331  (!) 104  138/77 93 % 12/30/18 1309    (!) 156/102 98 % 12/30/18 1235 98.1 °F (36.7 °C) (!) 117 20 (!) 157/97 97 % Oxygen Therapy O2 Sat (%): 99 % (12/30/18 1631) Pulse via Oximetry: 100 beats per minute (12/30/18 1631) O2 Device: Room air (12/30/18 1235) No intake or output data in the 24 hours ending 12/30/18 1726 Physical Exam: 
General:   Thin, frail, poor hygiene. Alert. Eyes:   Normal sclera. Extraocular movements intact. ENT:  Normocephalic, atraumatic. Moist mucous membranes Neck:  Supple, no lymphadenopathy or JVD 
CV:   RRR. No m/r/g. Peripheral pulses 2+. Capillary refill <2s. Lungs:  CTAB. No wheezing, rhonchi, or rales. Abdomen: Soft, epigastric tender, nondistended. Normoactive bowel sounds all 4 quadrants. Extremities: Warm and dry. No cyanosis or edema. Contusions to bilateral posterior shoulders, bilateral hips, and contusions to entire lumbar region. Contusions also noted to bilateral knees. She is able to move extremities but states being \"sore\". Neurologic: CN II-XII grossly intact. Sensation intact. Confusion to recent events and place. Delayed response in answering questions. Skin:     No rashes or jaundice. Normal coloration except listed in HPI. Psych:  Normal mood and affect.  
 
I reviewed the labs, imaging, EKGs, telemetry, and other studies done this admission. Data Review:  
Recent Results (from the past 24 hour(s)) CBC WITH AUTOMATED DIFF Collection Time: 12/30/18  1:03 PM  
Result Value Ref Range WBC 11.8 (H) 4.3 - 11.1 K/uL  
 RBC 5.90 (H) 4.05 - 5.2 M/uL  
 HGB 18.1 (H) 11.7 - 15.4 g/dL HCT 54.8 (H) 35.8 - 46.3 % MCV 92.9 79.6 - 97.8 FL  
 MCH 30.7 26.1 - 32.9 PG  
 MCHC 33.0 31.4 - 35.0 g/dL  
 RDW 12.9 11.9 - 14.6 % PLATELET 501 696 - 563 K/uL MPV 10.2 9.4 - 12.3 FL ABSOLUTE NRBC 0.00 0.0 - 0.2 K/uL  
 DF AUTOMATED NEUTROPHILS 80 (H) 43 - 78 % LYMPHOCYTES 11 (L) 13 - 44 % MONOCYTES 9 4.0 - 12.0 % EOSINOPHILS 0 (L) 0.5 - 7.8 % BASOPHILS 0 0.0 - 2.0 % IMMATURE GRANULOCYTES 1 0.0 - 5.0 %  
 ABS. NEUTROPHILS 9.5 (H) 1.7 - 8.2 K/UL  
 ABS. LYMPHOCYTES 1.3 0.5 - 4.6 K/UL  
 ABS. MONOCYTES 1.0 0.1 - 1.3 K/UL  
 ABS. EOSINOPHILS 0.0 0.0 - 0.8 K/UL  
 ABS. BASOPHILS 0.0 0.0 - 0.2 K/UL  
 ABS. IMM. GRANS. 0.1 0.0 - 0.5 K/UL METABOLIC PANEL, COMPREHENSIVE Collection Time: 12/30/18  1:03 PM  
Result Value Ref Range Sodium 150 (H) 136 - 145 mmol/L Potassium 3.7 3.5 - 5.1 mmol/L Chloride 113 (H) 98 - 107 mmol/L  
 CO2 28 21 - 32 mmol/L Anion gap 9 7 - 16 mmol/L Glucose 112 (H) 65 - 100 mg/dL BUN 32 (H) 8 - 23 MG/DL Creatinine 1.80 (H) 0.6 - 1.0 MG/DL  
 GFR est AA 37 (L) >60 ml/min/1.73m2 GFR est non-AA 30 (L) >60 ml/min/1.73m2 Calcium 9.6 8.3 - 10.4 MG/DL Bilirubin, total 0.8 0.2 - 1.1 MG/DL  
 ALT (SGPT) 29 12 - 65 U/L  
 AST (SGOT) 60 (H) 15 - 37 U/L Alk. phosphatase 122 50 - 136 U/L Protein, total 8.6 (H) 6.3 - 8.2 g/dL Albumin 4.4 3.2 - 4.6 g/dL Globulin 4.2 (H) 2.3 - 3.5 g/dL A-G Ratio 1.0 (L) 1.2 - 3.5 LIPASE Collection Time: 12/30/18  1:03 PM  
Result Value Ref Range Lipase 138 73 - 393 U/L  
ETHYL ALCOHOL Collection Time: 12/30/18  1:03 PM  
Result Value Ref Range ALCOHOL(ETHYL),SERUM <3 MG/DL POC LACTIC ACID  Collection Time: 12/30/18  1:13 PM  
 Result Value Ref Range Lactic Acid (POC) 1.72 0.5 - 1.9 mmol/L  
TYPE & SCREEN Collection Time: 12/30/18  1:58 PM  
Result Value Ref Range Crossmatch Expiration 01/02/2019 ABO/Rh(D) A POSITIVE Antibody screen NEG   
URINALYSIS W/ RFLX MICROSCOPIC Collection Time: 12/30/18  2:23 PM  
Result Value Ref Range Color SNEHAL Appearance TURBID Specific gravity 1.026 (H) 1.001 - 1.023    
 pH (UA) 6.0 5.0 - 9.0 Protein 100 (A) NEG mg/dL Glucose NEGATIVE  mg/dL Ketone 40 (A) NEG mg/dL Bilirubin SMALL (A) NEG Blood MODERATE (A) NEG Urobilinogen 0.2 0.2 - 1.0 EU/dL Nitrites NEGATIVE  NEG Leukocyte Esterase MODERATE (A) NEG    
DRUG SCREEN, URINE Collection Time: 12/30/18  2:23 PM  
Result Value Ref Range PCP(PHENCYCLIDINE) NEGATIVE BENZODIAZEPINES NEGATIVE     
 COCAINE NEGATIVE AMPHETAMINES NEGATIVE METHADONE NEGATIVE     
 THC (TH-CANNABINOL) NEGATIVE     
 OPIATES NEGATIVE     
 BARBITURATES NEGATIVE URINE MICROSCOPIC Collection Time: 12/30/18  2:23 PM  
Result Value Ref Range WBC  0 /hpf  
 RBC 10-20 0 /hpf Epithelial cells  0 /hpf Bacteria 2+ (H) 0 /hpf Casts 0 0 /lpf Crystals, urine 0 0 /LPF Amorphous Crystals TRACE 0 Mucus 0 0 /lpf URINE MICROSCOPIC Collection Time: 12/30/18  3:20 PM  
Result Value Ref Range WBC 0-3 0 /hpf  
 RBC 5-10 0 /hpf Epithelial cells 0-3 0 /hpf Bacteria 0 0 /hpf Casts 0-3 0 /lpf  
AMMONIA Collection Time: 12/30/18  4:30 PM  
Result Value Ref Range Ammonia 15 11 - 32 UMOL/L All Micro Results Procedure Component Value Units Date/Time CULTURE, URINE [[id number]] Order Status:  Sent Specimen:  Cath Urine Other Studies: 
Ct Head Wo Cont Result Date: 12/30/2018 CT HEAD WITHOUT CONTRAST, 12/30/2018 History: Nausea, vomiting, and diarrhea for 2 days.  Comparison: CT head without contrast 9/9/2017 Technique:   5 mm axial scans from the skull base to the vertex. All CT scans performed at this facility use one or all of the following: Automated exposure control, adjustment of the mA and/or kVp according to patient's size, iterative reconstruction. Findings:  No evidence of intracranial hemorrhage is seen. No abnormal extra-axial fluid collections are seen. Moderate to advanced cortical involutional changes are seen which are not significantly changed since the prior study although do appear advanced for the patient's age. No evidence for acute hydrocephalus is seen. No evidence of midline shift or herniation is seen. No abnormal edema pattern is seen in a vascular distribution to suggest large artery infarction. Evaluation with bone windows shows no acute osseous changes. The visualized sinuses, middle ears, and mastoid air cells are well aerated. IMPRESSION:  1. No acute intracranial process evident by noncontrast CT study of the head. Assessment and Plan:  
 
Hospital Problems as of 12/30/2018 Date Reviewed: 9/12/2017 Codes Class Noted - Resolved POA Acute encephalopathy ICD-10-CM: G93.40 ICD-9-CM: 348.30  12/30/2018 - Present Unknown PLAN: 
1. Acute encephalopathy: CT of the head negative. She has a history of ETOH abuse and unsure of last use or amount. Less than 3 in ED with ammonia level 15. Banana bag x3 days, check magnesium level, Ativan IV prn for withdrawal symptoms. 2. UTI: Culture urine, Rocephin IV for now. 3. Hypernatremia: Will trend CMP. Should improve with hydration. 4. PASQUALE: See above. NS 100ml/h between receiving Banana bag. 
5. GI bleed: hemocult positive. GI consulted and obtaining CT with oral contrast given her GFR of 30. NPO for now. Have also ordered Protonix 40 mg IV bid. 6. ETOH abuse: Will observe for withdrawal symptoms. Fall precautions, Ativan IV as needed. I am checking a magnesium level and Banana bag ordered. 7. Multiple contusions: X rays to bilateral shoulders, bilateral hips, bilateral knees, and urogram for renal given contusion to entire lumbar region and blood in urine. Hard to evaluate pain to area given mentation. Hemoglobin 18.1. *I am obtaining a baseline chest x ray and EKG also. Discharge planning: Home DVT ppx: SCD (GI bleed so no medications). Code status: Full Admit status: Inpatient. Estimated LOS:  greater than 2 midnights Risk:  high Signed: 
Kacie Donahue NP

## 2018-12-30 NOTE — PROGRESS NOTES
END OF SHIFT NOTE: 
 
INTAKE/OUTPUT No intake/output data recorded. Voiding: YES Catheter: NO 
Color: SNEHAL Drain: DIET 
NPO Flatus: Patient does have flatus present. Stool:  0 occurrences. Characteristics: 
  
 
Ambulating Yes Emesis: 0 occurrences. Characteristics: VITAL SIGNS Patient Vitals for the past 12 hrs: 
 Temp Pulse Resp BP SpO2  
12/30/18 1730 98.6 °F (37 °C) 95 20 134/87 99 % 12/30/18 1631    142/83 99 % 12/30/18 1331  (!) 104  138/77 93 % 12/30/18 1309    (!) 156/102 98 % 12/30/18 1235 98.1 °F (36.7 °C) (!) 117 20 (!) 157/97 97 % Pain Assessment Pain Intensity 1: 5 (12/30/18 1739) Pain Location 1: (shoulders and back) Pain Intervention(s) 1: Elevation, Emotional support, Environmental changes Patient Stated Pain Goal: 3 Ceasar Sanchez RN

## 2018-12-30 NOTE — PROGRESS NOTES
TRANSFER - IN REPORT: 
 
Verbal report received from [facility] in ED on Francy Rodriguez  being received from ED for routine progression of care Report consisted of patients Situation, Background, Assessment and  
Recommendations(SBAR). Information from the following report(s) SBAR was reviewed with the receiving nurse. Opportunity for questions and clarification was provided. Assessment completed upon patients arrival to unit and care assumed.

## 2018-12-30 NOTE — ED PROVIDER NOTES
Patient is a 81-year-old female presenting via EMS after she reports her friends called an ambulance because she was confused experiencing melena. Patient is unable to tell me how many days this has been going on. She states she lives alone. She denies use of any blood thinning medications. She has mild diffuse abdominal pain. Patient extremely poor historian and answers yes to most questions. She does however deny any recent fevers, headache or shortness of breath but reports abdominal pain, dysuria  And vomiting. She denies any recent alcohol or drug use. Chart review his previous issues with alcoholism. Nurses report they cleaned off a large amount of feces from the patient before getting her into bed The history is provided by the patient. History limited by: altered mental status. No  was used. Melena Associated symptoms include abdominal pain, nausea and vomiting. Pertinent negatives include no fever, no headaches and no coughing. Past Medical History:  
Diagnosis Date  Alcohol abuse 10/31/2015  Alcohol withdrawal ([facility] [address] 10/31/2015  Asthma  Other ill-defined conditions(799.89)   
 lumbar and thoracic scoliosis  Other ill-defined conditions(799.89) irregular heart rate  Psychiatric disorder ADHD  Seizures ([facility] [address] Past Surgical History:  
Procedure Laterality Date  HX GYN    
 hysterectomy  HX HEENT    
 sinus surgery. deviated septum.  HX ORTHOPAEDIC    
 right knee No family history on file. Social History Socioeconomic History  Marital status:  Spouse name: Not on file  Number of children: Not on file  Years of education: Not on file  Highest education level: Not on file Social Needs  Financial resource strain: Not on file  Food insecurity - worry: Not on file  Food insecurity - inability: Not on file  Transportation needs - medical: Not on file  Transportation needs - non-medical: Not on file Occupational History  Not on file Tobacco Use  Smoking status: Current Every Day Smoker Packs/day: 0.25  Smokeless tobacco: Never Used Substance and Sexual Activity  Alcohol use: No  
  Alcohol/week: 0.0 oz  Drug use: No  
 Sexual activity: Not on file Other Topics Concern  Not on file Social History Narrative  Not on file ALLERGIES: Patient has no known allergies. Review of Systems Constitutional: Negative for fatigue and fever. HENT: Negative for sore throat. Respiratory: Negative for cough, chest tightness and shortness of breath. Cardiovascular: Negative for leg swelling. Gastrointestinal: Positive for abdominal pain, melena, nausea and vomiting. Genitourinary: Positive for dysuria. Musculoskeletal: Negative for back pain. Neurological: Negative for syncope and headaches. Psychiatric/Behavioral: Positive for confusion. Vitals:  
 12/30/18 1235 BP: (!) 157/97 Pulse: (!) 117 Resp: 20 Temp: 98.1 °F (36.7 °C) SpO2: 97% Weight: 54.4 kg (120 lb) Height: 5' 5\" (1.651 m) Physical Exam  
Constitutional: She is oriented to person, place, and time. She appears well-developed and well-nourished. No distress. HENT:  
Head: Normocephalic and atraumatic. Eyes: Conjunctivae and EOM are normal. Pupils are equal, round, and reactive to light. Neck: Normal range of motion. Neck supple. Cardiovascular: Normal rate, regular rhythm and normal heart sounds. Pulmonary/Chest: Effort normal and breath sounds normal. No respiratory distress. She has no wheezes. She has no rales. Abdominal: Soft. She exhibits no distension. There is no tenderness. There is no rebound. Musculoskeletal: Normal range of motion. She exhibits no edema or tenderness. Neurological: She is alert and oriented to person, place, and time. Skin: Skin is warm and dry. No rash noted. She is not diaphoretic. Psychiatric:  
Patient makes eye contact and seems pleasantly confused. She is able to state the year and President but is unable to tell me what month currently in. Nursing note and vitals reviewed. MDM Number of Diagnoses or Management Options Acute encephalopathy: new and requires workup Acute kidney injury [facility]): new and requires workup Diagnosis management comments: Patient still confused though somewhat improved. Trending up from baseline. We will add an ammonia. Head CT negative. Admitted to hospitalist for further evaluation and treatment. Patrick Shepard MD; 12/30/2018 @4:26 PM Voice dictation software was used during the making of this note. This software is not perfect and grammatical and other typographical errors may be present. This note has not been proofread for errors. 
=================================================================== Amount and/or Complexity of Data Reviewed Clinical lab tests: reviewed and ordered (Results for orders placed or performed during the hospital encounter of 12/30/18 
-CBC WITH AUTOMATED DIFF Result                      Value             Ref Range WBC                         11.8 (H)          4.3 - 11.1 K* 
     RBC                         5.90 (H)          4.05 - 5.2 M* HGB                         18.1 (H)          11.7 - 15.4 * HCT                         54.8 (H)          35.8 - 46.3 % MCV                         92.9              79.6 - 97.8 * MCH                         30.7              26.1 - 32.9 * MCHC                        33.0              31.4 - 35.0 * RDW                         12.9              11.9 - 14.6 % PLATELET                    380               150 - 450 K/*      MPV                         10.2              9.4 - 12.3 FL 
 ABSOLUTE NRBC               0.00              0.0 - 0.2 K/* DF                          AUTOMATED NEUTROPHILS                 80 (H)            43 - 78 % LYMPHOCYTES                 11 (L)            13 - 44 % MONOCYTES                   9                 4.0 - 12.0 % EOSINOPHILS                 0 (L)             0.5 - 7.8 % BASOPHILS                   0                 0.0 - 2.0 % IMMATURE GRANULOCYTES       1                 0.0 - 5.0 %   
     ABS. NEUTROPHILS            9.5 (H)           1.7 - 8.2 K/* ABS. LYMPHOCYTES            1.3               0.5 - 4.6 K/* ABS. MONOCYTES              1.0               0.1 - 1.3 K/* ABS. EOSINOPHILS            0.0               0.0 - 0.8 K/* ABS. BASOPHILS              0.0               0.0 - 0.2 K/* ABS. IMM. GRANS.            0.1               0.0 - 0.5 K/* 
-METABOLIC PANEL, COMPREHENSIVE Result                      Value             Ref Range Sodium                      150 (H)           136 - 145 mm* Potassium                   3.7               3.5 - 5.1 mm* Chloride                    113 (H)           98 - 107 mmo* CO2                         28                21 - 32 mmol* Anion gap                   9                 7 - 16 mmol/L Glucose                     112 (H)           65 - 100 mg/* BUN                         32 (H)            8 - 23 MG/DL Creatinine                  1.80 (H)          0.6 - 1.0 MG* 
     GFR est AA                  37 (L)            >60 ml/min/1* GFR est non-AA              30 (L)            >60 ml/min/1* Calcium                     9.6               8.3 - 10.4 M* Bilirubin, total            0.8               0.2 - 1.1 MG*      ALT (SGPT)                  29                12 - 65 U/L   
     AST (SGOT)                  60 (H)            15 - 37 U/L   
 Alk. phosphatase            122               50 - 136 U/L Protein, total              8.6 (H)           6.3 - 8.2 g/* Albumin                     4.4               3.2 - 4.6 g/* Globulin                    4.2 (H)           2.3 - 3.5 g/* A-G Ratio                   1.0 (L)           1.2 - 3.5     
-LIPASE Result                      Value             Ref Range Lipase                      138               73 - 393 U/L  
-URINALYSIS W/ RFLX MICROSCOPIC Result                      Value             Ref Range Color                       SNEHAL Appearance                  TURBID Specific gravity            1.026 (H)         1.001 - 1.02* pH (UA)                     6.0               5.0 - 9.0 Protein                     100 (A)           NEG mg/dL Glucose                     NEGATIVE          mg/dL Ketone                      40 (A)            NEG mg/dL Bilirubin                   SMALL (A)         NEG Blood                       MODERATE (A)      NEG Urobilinogen                0.2               0.2 - 1.0 EU* Nitrites                    NEGATIVE          NEG Leukocyte Esterase          MODERATE (A)      NEG           
-ETHYL ALCOHOL Result                      Value             Ref Range ALCOHOL(ETHYL),SERUM        <3                MG/DL         
-DRUG SCREEN, URINE Result                      Value             Ref Range PCP(PHENCYCLIDINE)          NEGATIVE BENZODIAZEPINES             NEGATIVE                        
     COCAINE                     NEGATIVE AMPHETAMINES                NEGATIVE      METHADONE                   NEGATIVE                        
 THC (TH-CANNABINOL)         NEGATIVE                        
     OPIATES                     NEGATIVE                        
     BARBITURATES                NEGATIVE                        
-URINE MICROSCOPIC Result                      Value             Ref Range WBC                                     0 /hpf        
     RBC                         10-20             0 /hpf Epithelial cells                        0 /hpf Bacteria                    2+ (H)            0 /hpf Casts                       0                 0 /lpf Crystals, urine             0                 0 /LPF Amorphous Crystals          TRACE             0             
     Mucus                       0                 0 /lpf        
-URINE MICROSCOPIC Result                      Value             Ref Range WBC                         0-3               0 /hpf        
     RBC                         5-10              0 /hpf Epithelial cells            0-3               0 /hpf Bacteria                    0                 0 /hpf Casts                       0-3               0 /lpf        
-POC LACTIC ACID Result                      Value             Ref Range Lactic Acid (POC)           1.72              0.5 - 1.9 mm* 
-TYPE & SCREEN Result                      Value             Ref Range Crossmatch Expiration       01/02/2019 ABO/Rh(D)                   A POSITIVE Antibody screen             NEG                             
) 
Tests in the radiology section of CPT®: ordered and reviewed (Ct Head Wo Cont Result Date: 12/30/2018 CT HEAD WITHOUT CONTRAST, 12/30/2018 History: Nausea, vomiting, and diarrhea for 2 days.  Comparison: CT head without contrast 9/9/2017 Technique:   5 mm axial scans from the skull base to the vertex. All CT scans performed at this facility use one or all of the following: Automated exposure control, adjustment of the mA and/or kVp according to patient's size, iterative reconstruction. Findings:  No evidence of intracranial hemorrhage is seen. No abnormal extra-axial fluid collections are seen. Moderate to advanced cortical involutional changes are seen which are not significantly changed since the prior study although do appear advanced for the patient's age. No evidence for acute hydrocephalus is seen. No evidence of midline shift or herniation is seen. No abnormal edema pattern is seen in a vascular distribution to suggest large artery infarction. Evaluation with bone windows shows no acute osseous changes. The visualized sinuses, middle ears, and mastoid air cells are well aerated. IMPRESSION:  1. No acute intracranial process evident by noncontrast CT study of the head. ) Review and summarize past medical records: yes Discuss the patient with other providers: yes Independent visualization of images, tracings, or specimens: yes Risk of Complications, Morbidity, and/or Mortality Presenting problems: moderate Diagnostic procedures: moderate Management options: moderate Patient Progress Patient progress: improved Procedures

## 2018-12-30 NOTE — ROUTINE PROCESS
TRANSFER - OUT REPORT: 
 
Verbal report given to Louis Garcia on Jaimee Jerry  being transferred to [address] [place] [address] for routine progression of care Report consisted of patients Situation, Background, Assessment and  
Recommendations(SBAR). Information from the following report(s) SBAR was reviewed with the receiving nurse. Lines:  
Peripheral IV 12/30/18 Right Antecubital (Active) Site Assessment Clean, dry, & intact 12/30/2018  1:10 PM  
Phlebitis Assessment 0 12/30/2018  1:10 PM  
Infiltration Assessment 0 12/30/2018  1:10 PM  
Dressing Status Clean, dry, & intact 12/30/2018  1:10 PM  
Dressing Type Transparent 12/30/2018  1:10 PM  
Hub Color/Line Status Pink;Flushed;Patent 12/30/2018  1:10 PM  
  
 
Opportunity for questions and clarification was provided. Patient transported with: 
 [employer]

## 2018-12-31 ENCOUNTER — APPOINTMENT (OUTPATIENT)
Dept: ULTRASOUND IMAGING | Age: 63
DRG: 071 | End: 2018-12-31
Attending: NURSE PRACTITIONER
Payer: MEDICARE

## 2018-12-31 PROBLEM — G93.41 ACUTE METABOLIC ENCEPHALOPATHY: Status: ACTIVE | Noted: 2018-12-31

## 2018-12-31 LAB
ALBUMIN SERPL-MCNC: 2.9 G/DL (ref 3.2–4.6)
ALBUMIN/GLOB SERPL: 1.2 {RATIO} (ref 1.2–3.5)
ALP SERPL-CCNC: 83 U/L (ref 50–136)
ALT SERPL-CCNC: 18 U/L (ref 12–65)
ANION GAP SERPL CALC-SCNC: 8 MMOL/L (ref 7–16)
AST SERPL-CCNC: 29 U/L (ref 15–37)
ATRIAL RATE: 96 BPM
BASOPHILS # BLD: 0 K/UL (ref 0–0.2)
BASOPHILS NFR BLD: 0 % (ref 0–2)
BILIRUB SERPL-MCNC: 1.1 MG/DL (ref 0.2–1.1)
BUN SERPL-MCNC: 13 MG/DL (ref 8–23)
CALCIUM SERPL-MCNC: 8 MG/DL (ref 8.3–10.4)
CALCULATED P AXIS, ECG09: 76 DEGREES
CALCULATED R AXIS, ECG10: 75 DEGREES
CALCULATED T AXIS, ECG11: 58 DEGREES
CHLORIDE SERPL-SCNC: 110 MMOL/L (ref 98–107)
CO2 SERPL-SCNC: 26 MMOL/L (ref 21–32)
CREAT SERPL-MCNC: 0.9 MG/DL (ref 0.6–1)
DIAGNOSIS, 93000: NORMAL
DIFFERENTIAL METHOD BLD: ABNORMAL
EOSINOPHIL # BLD: 0.1 K/UL (ref 0–0.8)
EOSINOPHIL NFR BLD: 1 % (ref 0.5–7.8)
ERYTHROCYTE [DISTWIDTH] IN BLOOD BY AUTOMATED COUNT: 12.4 % (ref 11.9–14.6)
GLOBULIN SER CALC-MCNC: 2.5 G/DL (ref 2.3–3.5)
GLUCOSE SERPL-MCNC: 97 MG/DL (ref 65–100)
HCT VFR BLD AUTO: 35.7 % (ref 35.8–46.3)
HGB BLD-MCNC: 11.7 G/DL (ref 11.7–15.4)
IMM GRANULOCYTES # BLD: 0 K/UL (ref 0–0.5)
IMM GRANULOCYTES NFR BLD AUTO: 0 % (ref 0–5)
LYMPHOCYTES # BLD: 2.5 K/UL (ref 0.5–4.6)
LYMPHOCYTES NFR BLD: 32 % (ref 13–44)
MAGNESIUM SERPL-MCNC: 1.8 MG/DL (ref 1.8–2.4)
MCH RBC QN AUTO: 30.5 PG (ref 26.1–32.9)
MCHC RBC AUTO-ENTMCNC: 32.8 G/DL (ref 31.4–35)
MCV RBC AUTO: 93.2 FL (ref 79.6–97.8)
MONOCYTES # BLD: 0.7 K/UL (ref 0.1–1.3)
MONOCYTES NFR BLD: 9 % (ref 4–12)
NEUTS SEG # BLD: 4.5 K/UL (ref 1.7–8.2)
NEUTS SEG NFR BLD: 57 % (ref 43–78)
NRBC # BLD: 0 K/UL (ref 0–0.2)
P-R INTERVAL, ECG05: 132 MS
PLATELET # BLD AUTO: 230 K/UL (ref 150–450)
PMV BLD AUTO: 10.5 FL (ref 9.4–12.3)
POTASSIUM SERPL-SCNC: 2.6 MMOL/L (ref 3.5–5.1)
POTASSIUM SERPL-SCNC: 2.7 MMOL/L (ref 3.5–5.1)
POTASSIUM SERPL-SCNC: 2.7 MMOL/L (ref 3.5–5.1)
PROT SERPL-MCNC: 5.4 G/DL (ref 6.3–8.2)
Q-T INTERVAL, ECG07: 386 MS
QRS DURATION, ECG06: 84 MS
QTC CALCULATION (BEZET), ECG08: 487 MS
RBC # BLD AUTO: 3.83 M/UL (ref 4.05–5.2)
SODIUM SERPL-SCNC: 144 MMOL/L (ref 136–145)
VENTRICULAR RATE, ECG03: 96 BPM
WBC # BLD AUTO: 7.9 K/UL (ref 4.3–11.1)

## 2018-12-31 PROCEDURE — 97162 PT EVAL MOD COMPLEX 30 MIN: CPT

## 2018-12-31 PROCEDURE — 74011000302 HC RX REV CODE- 302: Performed by: NURSE PRACTITIONER

## 2018-12-31 PROCEDURE — 84132 ASSAY OF SERUM POTASSIUM: CPT

## 2018-12-31 PROCEDURE — 74011000250 HC RX REV CODE- 250: Performed by: NURSE PRACTITIONER

## 2018-12-31 PROCEDURE — 65660000000 HC RM CCU STEPDOWN

## 2018-12-31 PROCEDURE — 83735 ASSAY OF MAGNESIUM: CPT

## 2018-12-31 PROCEDURE — 76770 US EXAM ABDO BACK WALL COMP: CPT

## 2018-12-31 PROCEDURE — 74011250636 HC RX REV CODE- 250/636: Performed by: NURSE PRACTITIONER

## 2018-12-31 PROCEDURE — 74011250637 HC RX REV CODE- 250/637: Performed by: NURSE PRACTITIONER

## 2018-12-31 PROCEDURE — 80053 COMPREHEN METABOLIC PANEL: CPT

## 2018-12-31 PROCEDURE — 86580 TB INTRADERMAL TEST: CPT | Performed by: NURSE PRACTITIONER

## 2018-12-31 PROCEDURE — 85025 COMPLETE CBC W/AUTO DIFF WBC: CPT

## 2018-12-31 PROCEDURE — 74011000258 HC RX REV CODE- 258: Performed by: NURSE PRACTITIONER

## 2018-12-31 PROCEDURE — 77030032490 HC SLV COMPR SCD KNE COVD -B

## 2018-12-31 PROCEDURE — 77030020263 HC SOL INJ SOD CL0.9% LFCR 1000ML

## 2018-12-31 PROCEDURE — 36415 COLL VENOUS BLD VENIPUNCTURE: CPT

## 2018-12-31 PROCEDURE — C9113 INJ PANTOPRAZOLE SODIUM, VIA: HCPCS | Performed by: NURSE PRACTITIONER

## 2018-12-31 RX ORDER — SODIUM CHLORIDE, SODIUM LACTATE, POTASSIUM CHLORIDE, CALCIUM CHLORIDE 600; 310; 30; 20 MG/100ML; MG/100ML; MG/100ML; MG/100ML
100 INJECTION, SOLUTION INTRAVENOUS CONTINUOUS
Status: CANCELLED | OUTPATIENT
Start: 2018-12-31

## 2018-12-31 RX ORDER — POTASSIUM CHLORIDE 14.9 MG/ML
20 INJECTION INTRAVENOUS
Status: COMPLETED | OUTPATIENT
Start: 2018-12-31 | End: 2018-12-31

## 2018-12-31 RX ORDER — POLYETHYLENE GLYCOL 3350 17 G/17G
238 POWDER, FOR SOLUTION ORAL ONCE
Status: COMPLETED | OUTPATIENT
Start: 2019-01-01 | End: 2019-01-01

## 2018-12-31 RX ORDER — POTASSIUM CHLORIDE 20 MEQ/1
40 TABLET, EXTENDED RELEASE ORAL
Status: COMPLETED | OUTPATIENT
Start: 2018-12-31 | End: 2018-12-31

## 2018-12-31 RX ORDER — BISACODYL 5 MG
10 TABLET, DELAYED RELEASE (ENTERIC COATED) ORAL
Status: COMPLETED | OUTPATIENT
Start: 2019-01-01 | End: 2019-01-01

## 2018-12-31 RX ADMIN — Medication 10 ML: at 13:35

## 2018-12-31 RX ADMIN — POTASSIUM CHLORIDE 20 MEQ: 14.9 INJECTION, SOLUTION INTRAVENOUS at 15:37

## 2018-12-31 RX ADMIN — SODIUM CHLORIDE 100 ML/HR: 900 INJECTION, SOLUTION INTRAVENOUS at 01:36

## 2018-12-31 RX ADMIN — Medication 10 ML: at 21:14

## 2018-12-31 RX ADMIN — Medication 10 ML: at 05:19

## 2018-12-31 RX ADMIN — POTASSIUM CHLORIDE 20 MEQ: 14.9 INJECTION, SOLUTION INTRAVENOUS at 21:11

## 2018-12-31 RX ADMIN — LORAZEPAM 1 MG: 2 INJECTION, SOLUTION INTRAMUSCULAR; INTRAVENOUS at 13:35

## 2018-12-31 RX ADMIN — MORPHINE SULFATE 2 MG: 2 INJECTION, SOLUTION INTRAMUSCULAR; INTRAVENOUS at 13:35

## 2018-12-31 RX ADMIN — POTASSIUM CHLORIDE 20 MEQ: 14.9 INJECTION, SOLUTION INTRAVENOUS at 13:33

## 2018-12-31 RX ADMIN — SODIUM CHLORIDE 100 ML/HR: 900 INJECTION, SOLUTION INTRAVENOUS at 13:32

## 2018-12-31 RX ADMIN — MORPHINE SULFATE 2 MG: 2 INJECTION, SOLUTION INTRAMUSCULAR; INTRAVENOUS at 21:28

## 2018-12-31 RX ADMIN — SODIUM CHLORIDE 40 MG: 9 INJECTION, SOLUTION INTRAMUSCULAR; INTRAVENOUS; SUBCUTANEOUS at 21:07

## 2018-12-31 RX ADMIN — CEFTRIAXONE SODIUM 1 G: 1 INJECTION, POWDER, FOR SOLUTION INTRAMUSCULAR; INTRAVENOUS at 17:24

## 2018-12-31 RX ADMIN — POTASSIUM CHLORIDE 20 MEQ: 14.9 INJECTION, SOLUTION INTRAVENOUS at 19:07

## 2018-12-31 RX ADMIN — MORPHINE SULFATE 2 MG: 2 INJECTION, SOLUTION INTRAMUSCULAR; INTRAVENOUS at 17:20

## 2018-12-31 RX ADMIN — LORAZEPAM 1 MG: 2 INJECTION, SOLUTION INTRAMUSCULAR; INTRAVENOUS at 21:26

## 2018-12-31 RX ADMIN — TUBERCULIN PURIFIED PROTEIN DERIVATIVE 5 UNITS: 5 INJECTION, SOLUTION INTRADERMAL at 08:29

## 2018-12-31 RX ADMIN — MORPHINE SULFATE 2 MG: 2 INJECTION, SOLUTION INTRAMUSCULAR; INTRAVENOUS at 08:23

## 2018-12-31 RX ADMIN — MORPHINE SULFATE 2 MG: 2 INJECTION, SOLUTION INTRAMUSCULAR; INTRAVENOUS at 00:25

## 2018-12-31 RX ADMIN — LORAZEPAM 1 MG: 2 INJECTION, SOLUTION INTRAMUSCULAR; INTRAVENOUS at 17:20

## 2018-12-31 RX ADMIN — POTASSIUM CHLORIDE 40 MEQ: 20 TABLET, EXTENDED RELEASE ORAL at 13:35

## 2018-12-31 RX ADMIN — LORAZEPAM 1 MG: 2 INJECTION, SOLUTION INTRAMUSCULAR; INTRAVENOUS at 08:24

## 2018-12-31 RX ADMIN — LORAZEPAM 1 MG: 2 INJECTION, SOLUTION INTRAMUSCULAR; INTRAVENOUS at 03:18

## 2018-12-31 RX ADMIN — THIAMINE HYDROCHLORIDE: 100 INJECTION, SOLUTION INTRAMUSCULAR; INTRAVENOUS at 17:19

## 2018-12-31 RX ADMIN — SODIUM CHLORIDE 40 MG: 9 INJECTION, SOLUTION INTRAMUSCULAR; INTRAVENOUS; SUBCUTANEOUS at 10:46

## 2018-12-31 NOTE — PROGRESS NOTES
Radiology made aware pt completed 2 hour of hydration protocol and completed oral contrast. Transport set up and pt ready for radiology.

## 2018-12-31 NOTE — CDMP QUERY
Please clarify if this patient is being treated/managed for: 
 
Acute metabolic encephalopathy or Toxic encephalopathy in the setting of \"binge drinking\" with confusion to recent events and place. Delayed response in answering questions and being treated with CT of head, labs, prn Ativan 
 
  
=>Other Explanation of clinical findings =>Unable to Determine (no explanation of clinical findings) The medical record reflects the following: 
 
Risk Factors: \"binge drinking\" Clinical Indicators: confusion to recent events and place. Delayed response in answering questions Treatment: CT of head, labs, prn Ativan Please clarify and document your clinical opinion in the progress notes and discharge summary including the definitive and/or presumptive diagnosis, (suspected or probable), related to the above clinical findings. Please include clinical findings supporting your diagnosis. Thanks, Hal Stiles RN, CDS Compliant Documentation Management Program 
[phone]

## 2018-12-31 NOTE — PROGRESS NOTES
Problem: Mobility Impaired (Adult and Pediatric) Goal: *Acute Goals and Plan of Care (Insert Text) (1.)Pt will move from supine to sit and sit to supine , scoot up and down and roll side to side with SUPERVISION within 7 treatment day(s). (2.)Pt will transfer from bed to chair and chair to bed with SUPERVISION using the least restrictive device within 7 treatment day(s). (3.)Pt will ambulate with SUPERVISION for 100 feet with the least restrictive device within 7 treatment day(s), O2 stats >90%. PHYSICAL THERAPY: Initial Assessment, Treatment Day: Day of Assessment, PM 12/31/2018INPATIENT: Hospital Day: 2 Payor: [facility] [place] MEDICARE / Plan: Gissel Grewal OF [place] MEDICARE H[place]/PPO / Product Type: [employer] Medicare /  
  
NAME/AGE/GENDER: Effie Stevenson is a 59 y.o. female PRIMARY DIAGNOSIS: Acute encephalopathy <principal problem not specified> <principal problem not specified> Procedure(s) (LRB): ESOPHAGOGASTRODUODENOSCOPY (EGD) (N/A) COLONOSCOPY  BMI 20/ROOM 607 (N/A) ICD-10: Treatment Diagnosis: 
 · Generalized Muscle Weakness (M62.81) · Difficulty in walking, Not elsewhere classified (R26.2) Precaution/Allergies: 
Patient has no known allergies. ASSESSMENT:  
Ms. Ciarra Saavedra presents supine in bed and agreeable to therapy. Pt required CGA with transfers and verbal cues for walker negotiation in room. Pt stood and sat at toilet with CGA and cues for safety awareness. Pt ambulated 10ft within room before sitting EOB for 3min to recover from body soreness. Pt rated soreness 6/10. Pt with decreased dynamic balance while standing and with walker. Pt would benefit from continued skilled physical therapy to increase LE strength and restore functional mobility. This section established at most recent assessment PROBLEM LIST (Impairments causing functional limitations): 1. Decreased Strength 2. Decreased ADL/Functional Activities 3. Decreased Transfer Abilities 4. Decreased Balance 5. Increased Pain 6. Decreased Activity Tolerance 7. Increased Fatigue 8. Increased Shortness of Breath 9. Decreased Flexibility/Joint Mobility 10. Decreased Barney with Home Exercise Program 
 INTERVENTIONS PLANNED: (Benefits and precautions of physical therapy have been discussed with the patient.) 1. Balance Exercise 2. Bed Mobility 3. Family Education 4. Gait Training 5. Home Exercise Program (HEP) 6. Neuromuscular Re-education/Strengthening 7. Range of Motion (ROM) 8. Therapeutic Activites 9. Therapeutic Exercise/Strengthening 10. Transfer Training TREATMENT PLAN: Frequency/Duration: 3 times a week for duration of hospital stayRehabilitation Potential For Stated Goals: Good RECOMMENDED REHABILITATION/EQUIPMENT: (at time of discharge pending progress): Due to the probability of continued deficits (see above) this patient will likely need continued skilled physical therapy after discharge. Equipment:  
? None at this time HISTORY:  
History of Present Injury/Illness (Reason for Referral): 
Patient history was obtained from the ER provider prior to seeing the patient. 59year old CF presents to the ED via EMS for AMS. The patient is alone in the room during my exam and she tells me that her friends called EMS after being unable to reach her via phone and in person for the past week. She states living in an apartment with her cat. She states \"binge drinking\" for what she thinks may be the past week but can only recall events 2 weeks ago. She further states that she can go long periods without drinking and thinks the last binge was \"months ago\". Ms. Marly Collazo is noted to not remember events recently or even in the past other than a bar she likes to go to. She thought she was in a  Hospital in [place] and did not know what day it was. She also states having N/V/D for an unknown amount of time with epigastric pain she describes as \"sharp\".  She believes the pain began a few days ago but is not absolutely sure. She also states not eating or drinking for the past several days and  Unable to ambulate due to weakness. ED course: WBC with slight elevation 11.8, Ammonia level normal 15, lactic acid 1.72, H&H 18.1/54. 8. Her urine shows moderate blood with moderate leukocyte esterase, 2+ bacteria but also  epithelial cells. 40 ketones and small amount bilirubin. ETOH level less than 3 and UDS negative. Glucose 112. CT of head benign. BUN/Creatinine 32/1. 80. Sodium 150. She is noted hemocult positive and states having dark stools over the past few days although this may be longer given her mentation. She does deny blood in her vomitus. With her exam she is noted to have  Contusions to bilateral posterior shoulders, bilateral hips, and contusions to entire lumbar region. Contusions also noted to bilateral knees. She is able to move extremities but states being \"sore\".   
10 systems reviewed and negative except as noted in HPI. Past Medical History/Comorbidities: Ms. Fariha Enamorado  has a past medical history of Alcohol abuse, Alcohol withdrawal ([facility] [address] Asthma, Other ill-defined conditions(799.89), Other ill-defined conditions(799.89), Psychiatric disorder, and Seizures ([facility] [address] Ms. Fariha Enamorado  has a past surgical history that includes hx gyn; hx orthopaedic; and hx heent. Social History/Living Environment:  
Home Environment: Apartment # Steps to Enter: 0 One/Two Story Residence: One story Living Alone: Yes Support Systems: Friends \ neighbors Patient Expects to be [facility] Current DME Used/Available at Home: Cane, quad Prior Level of Function/Work/Activity: 
Pt notes she ambulates with cane at home but is trying to get a walker and shower aids due to her weakness and history of falls due to seizures. Number of Personal Factors/Comorbidities that affect the Plan of Care: 0: LOW COMPLEXITY EXAMINATION:  
 Most Recent Physical Functioning:  
Gross Assessment: 
AROM: Generally decreased, functional 
Strength: Generally decreased, functional 
         
  
Posture: 
Posture (WDL): Within defined limits Balance: 
Sitting: Intact Standing: Impaired Standing - Static: Good Standing - Dynamic : Fair Bed Mobility: 
Rolling: Contact guard assistance Supine to Sit: Contact guard assistance Sit to Supine: Contact guard assistance Scooting: Independent Wheelchair Mobility: 
  
Transfers: 
Sit to Stand: Contact guard assistance Stand to Sit: Contact guard assistance Gait: 
  
Base of Support: Widened Speed/Holli: Slow Step Length: Left shortened;Right shortened Gait Abnormalities: Decreased step clearance Distance (ft): 10 Feet (ft) Assistive Device: Gait belt;Walker Ambulation - Level of Assistance: Contact guard assistance Body Structures Involved: 1. Joints 2. Muscles 3. Ligaments Body Functions Affected: 1. Neuromusculoskeletal 
2. Movement Related Activities and Participation Affected: 1. Mobility 2. Self Care 3. Domestic Life 4. Interpersonal Interactions and Relationships 5. Community, Social and [place] Number of elements that affect the Plan of Care: 4+: HIGH COMPLEXITY CLINICAL PRESENTATION:  
Presentation: Stable and uncomplicated: LOW COMPLEXITY CLINICAL DECISION MAKIN[address] [place] [address]-[facility] 6 Clicks Basic Mobility Inpatient Short Form How much difficulty does the patient currently have. .. Unable A Lot A Little None 1. Turning over in bed (including adjusting bedclothes, sheets and blankets)? [] 1   [] 2   [] 3   [x] 4  
2. Sitting down on and standing up from a chair with arms ( e.g., wheelchair, bedside commode, etc.)   [] 1   [] 2   [x] 3   [] 4  
3. Moving from lying on back to sitting on the side of the bed? [] 1   [] 2   [x] 3   [] 4 How much help from another person does the patient currently need. .. Total A Lot A Little None 4. Moving to and from a bed to a chair (including a wheelchair)? [] 1   [] 2   [x] 3   [] 4  
5. Need to walk in hospital room? [] 1   [] 2   [x] 3   [] 4  
6. Climbing 3-5 steps with a railing? [] 1   [x] 2   [] 3   [] 4  
© 2007, Trustees of [facility] [employer], under license to [employer]. All rights reserved Score:  Initial: 18 Most Recent: X (Date: -- ) Interpretation of Tool:  Represents activities that are increasingly more difficult (i.e. Bed mobility, Transfers, Gait). Score 24 23 22-20 19-15 14-10 9-7 6 Modifier CH CI CJ CK CL CM CN Payor: Jacquelyn Rod OF [place] MEDICARE / Plan: [place] [facility] MEDICARE HMO/PPO / Product Type: Managed Care Medicare /   
 
Medical Necessity:    
· Patient is expected to demonstrate progress in strength, range of motion, balance, coordination and functional technique to decrease assistance required with ADLs and promote independence with functional mobility. Reason for Services/Other Comments: 
· Patient continues to require skilled intervention due to decreased activity and mobility tolerance. Use of outcome tool(s) and clinical judgement create a POC that gives a: Clear prediction of patient's progress: LOW COMPLEXITY  
  
 
 
 
TREATMENT:  
(In addition to Assessment/Re-Assessment sessions the following treatments were rendered) Pre-treatment Symptoms/Complaints:  My body feels sore Pain: Initial: 6/10 Post Session:  6/10 Assessment/Reassessment only, no treatment provided today Braces/Orthotics/Lines/Etc:  
· IV 
· O2 Device: Room air Treatment/Session Assessment:   
· Response to Treatment:  Pt reported increased soreness throughout body while ambulating. · Interdisciplinary Collaboration:  
o Physical Therapist 
o Registered Nurse · After treatment position/precautions:  
o Supine in bed 
o Bed in low position 
o Call light within reach 
o RN notified · Compliance with Program/Exercises: Will assess as treatment progresses. Recommendations/Intent for next treatment session: \"Next visit will focus on advancements to more challenging activities and reduction in assistance provided\". Total Treatment Duration: PT Patient Time In/Time Out Time In: 2171 Time Out: 4913 Karine Britt PT, DPT

## 2018-12-31 NOTE — PROGRESS NOTES
Progress Note 2018 Admit Date: 2018 12:43 PM  
NAME: Bernabe Henderson :  1955 MRN:  [id number] Attending: Uzma Franco NP 
PCP:  None Treatment Team: Attending Provider: Uzma Franco NP; Consulting Provider: Aylin Koch MD; Charge Nurse: Tali Gonzalez; Care Manager: Yesi Mccoy, [facility] Full Code SUBJECTIVE:  
Ms. Marly Collazo is a 58 yo female with PMH of alcohol abuse, asthma, seizure disorder, ADHD show presented after being found by her friends in her apartment. Her friends called EMS when they could not get in touch with her by phone. She was found with multiple bruises consistent with fall, xrays bilateral shoulder, knees, hips neg. She was confused upon arrival however did state she had been binge drinking the past week. She was found to have heme + stools in ED, reported on admission dark stools for several days, today reported to me dark stools for \"years\". UA consistent with UTI, started on rocephin. CT head without acute findings. CT abd showed likely gastritis, 10mm left adrenal nodule incompletely characterized. GI consulted. Plans for EGD and colonoscopy Wednesday. Today pt confused at times. Hypokalemic. Hgb dropped however ?dilution, no active signs of bleeding. Denies CP, SOB, n/v/d, abd pain. Past Medical History:  
Diagnosis Date  Alcohol abuse 10/31/2015  Alcohol withdrawal ([facility] [address] 10/31/2015  Asthma  Other ill-defined conditions(799.89)   
 lumbar and thoracic scoliosis  Other ill-defined conditions(799.89) irregular heart rate  Psychiatric disorder ADHD  Seizures ([facility] [address] Recent Results (from the past 24 hour(s)) AMMONIA Collection Time: 18  4:30 PM  
Result Value Ref Range Ammonia 15 11 - 32 UMOL/L  
MAGNESIUM Collection Time: 18  6:23 PM  
Result Value Ref Range Magnesium 2.3 1.8 - 2.4 mg/dL EKG, 12 LEAD, INITIAL Collection Time: 18 12:20 AM  
Result Value Ref Range Ventricular Rate 96 BPM  
 Atrial Rate 96 BPM  
 P-R Interval 132 ms QRS Duration 84 ms Q-T Interval 386 ms QTC Calculation (Bezet) 487 ms Calculated P Axis 76 degrees Calculated R Axis 75 degrees Calculated T Axis 58 degrees Diagnosis Normal sinus rhythm Nonspecific ST and T wave abnormality Prolonged QT Abnormal ECG When compared with ECG of 29-MAY-2017 07:06, 
ST now depressed in Anterior leads QT has lengthened Confirmed by Zheng Taylor ([id number]) on 12/31/2018 9:78:37 PM 
  
METABOLIC PANEL, COMPREHENSIVE Collection Time: 12/31/18  6:27 AM  
Result Value Ref Range Sodium 144 136 - 145 mmol/L Potassium 2.6 (LL) 3.5 - 5.1 mmol/L Chloride 110 (H) 98 - 107 mmol/L  
 CO2 26 21 - 32 mmol/L Anion gap 8 7 - 16 mmol/L Glucose 97 65 - 100 mg/dL BUN 13 8 - 23 MG/DL Creatinine 0.90 0.6 - 1.0 MG/DL  
 GFR est AA >60 >60 ml/min/1.73m2 GFR est non-AA >60 >60 ml/min/1.73m2 Calcium 8.0 (L) 8.3 - 10.4 MG/DL Bilirubin, total 1.1 0.2 - 1.1 MG/DL  
 ALT (SGPT) 18 12 - 65 U/L  
 AST (SGOT) 29 15 - 37 U/L Alk. phosphatase 83 50 - 136 U/L Protein, total 5.4 (L) 6.3 - 8.2 g/dL Albumin 2.9 (L) 3.2 - 4.6 g/dL Globulin 2.5 2.3 - 3.5 g/dL A-G Ratio 1.2 1.2 - 3.5    
CBC WITH AUTOMATED DIFF Collection Time: 12/31/18  6:27 AM  
Result Value Ref Range WBC 7.9 4.3 - 11.1 K/uL  
 RBC 3.83 (L) 4.05 - 5.2 M/uL  
 HGB 11.7 11.7 - 15.4 g/dL HCT 35.7 (L) 35.8 - 46.3 % MCV 93.2 79.6 - 97.8 FL  
 MCH 30.5 26.1 - 32.9 PG  
 MCHC 32.8 31.4 - 35.0 g/dL  
 RDW 12.4 11.9 - 14.6 % PLATELET 967 477 - 781 K/uL MPV 10.5 9.4 - 12.3 FL ABSOLUTE NRBC 0.00 0.0 - 0.2 K/uL  
 DF AUTOMATED NEUTROPHILS 57 43 - 78 % LYMPHOCYTES 32 13 - 44 % MONOCYTES 9 4.0 - 12.0 % EOSINOPHILS 1 0.5 - 7.8 % BASOPHILS 0 0.0 - 2.0 % IMMATURE GRANULOCYTES 0 0.0 - 5.0 %  
 ABS. NEUTROPHILS 4.5 1.7 - 8.2 K/UL  
 ABS. LYMPHOCYTES 2.5 0.5 - 4.6 K/UL ABS. MONOCYTES 0.7 0.1 - 1.3 K/UL  
 ABS. EOSINOPHILS 0.1 0.0 - 0.8 K/UL  
 ABS. BASOPHILS 0.0 0.0 - 0.2 K/UL  
 ABS. IMM. GRANS. 0.0 0.0 - 0.5 K/UL POTASSIUM Collection Time: 12/31/18 11:25 AM  
Result Value Ref Range Potassium 2.7 (LL) 3.5 - 5.1 mmol/L No Known Allergies Current Facility-Administered Medications Medication Dose Route Frequency Provider Last Rate Last Dose  pantoprazole (PROTONIX) 40 mg in sodium chloride 0.9% 10 mL injection  40 mg IntraVENous Q12H Bessie Peon, NP   40 mg at 12/31/18 1046  tuberculin injection 5 Units  5 Units IntraDERMal Leticia Smith B, NP   5 Units at 12/31/18 0829  
 [START ON 1/1/2019] polyethylene glycol (MIRALAX) powder 238 g  238 g Oral Hosie John, NP      
Allen Hodan Sero ON 1/1/2019] bisacodyl (DULCOLAX) tablet 10 mg  10 mg Oral NOW Roxy Prophet, NP      
 potassium chloride 20 mEq in 100 ml IVPB  20 mEq IntraVENous Q2H Sandy Hash B, NP 50 mL/hr at 12/31/18 1537 20 mEq at 12/31/18 1537  
 sodium chloride (NS) flush 5-10 mL  5-10 mL IntraVENous Q8H Bessie Peon, NP   10 mL at 12/31/18 1335  sodium chloride (NS) flush 5-10 mL  5-10 mL IntraVENous PRN Bessie Peon, NP      
 morphine injection 2 mg  2 mg IntraVENous Q4H PRN Bessie Peon, NP   2 mg at 12/31/18 1335  
 naloxone [facility]) injection 0.4 mg  0.4 mg IntraVENous PRN Bessie Peon, NP      
 ondansetron [facility]) injection 4 mg  4 mg IntraVENous Q4H PRN Bessie Peon, NP      
 LORazepam (ATIVAN) 2 mg/mL injection 1 mg  1 mg IntraVENous Q4H PRN Bessie Peon, NP   1 mg at 12/31/18 1335  
 0.9% sodium chloride 1,000 mL with mvi, adult no. 4 with vit K 10 mL, thiamine 249 mg, folic acid 1 mg infusion   IntraVENous Q24H Bessie Peon,  mL/hr at 12/30/18 1328  cefTRIAXone (ROCEPHIN) 1 g in 0.9% sodium chloride (MBP/ADV) 50 mL  1 g IntraVENous Q24H Bessie Peon,  mL/hr at 12/30/18 1828 1 g at 12/30/18 1828  0.9% sodium chloride infusion  100 mL/hr IntraVENous CONTINUOUS Dao Mor,  mL/hr at 18 1332 100 mL/hr at 18 1332  
 0.9% sodium chloride infusion  1 mL/kg/hr IntraVENous PRN Dao Mor, NP      
 0.9% sodium chloride infusion  1 mL/kg/hr IntraVENous PRN Dao Mor, NP Review of Systems negative with exception of pertinent positives noted above PHYSICAL EXAM  
 
Visit Vitals /68 Pulse 64 Temp 98.1 °F (36.7 °C) Resp 16 Ht 5' 5\" (1.651 m) Wt 54.4 kg (120 lb) SpO2 95% BMI 19.97 kg/m² Temp (24hrs), Av.5 °F (36.9 °C), Min:98.1 °F (36.7 °C), Max:99.3 °F (37.4 °C) Oxygen Therapy O2 Sat (%): 95 % (18 1151) Pulse via Oximetry: 100 beats per minute (18 1631) O2 Device: Room air (18 1235) Intake/Output Summary (Last 24 hours) at 2018 1618 Last data filed at 2018 8990 Gross per 24 hour Intake 2184 ml Output 1401 ml Net 783 ml General: No acute distress, confused at times   
Lungs: CTA bilaterally. Resp even and nonlabored Heart:  S1S2 present without murmurs rubs gallops. RRR. No edema Abdomen: Soft, mild epigastric tenderness on palpation. BS present. No distention. Extremities: Moves ext spontaneously. No cyanosis. Neurologic:  Alert, confused at times to events. Follows commands. Results summary of Diagnostic Studies/Procedures copied from within [facility] EMR: 
ASSESSMENT Active Hospital Problems Diagnosis Date Noted  Acute encephalopathy 2018 Plan: 
 
Acute metabolic/toxic encephalopathy Likely secondary to UTI, ?alcohol withdrawal 
CT head negative for acute findings Continue alcohol withdrawal protocols Continue rocephin for UTI 
 
UTI Follow urine cx Continue rocephin Alcohol abuse UDS neg Alcohol <3 Continue withdrawal protocol, ativan prn Banana bag X3 doses Upper GI bleed GI consulted Plans for EGD/colonoscopy Wednesday Continue PPI 
 Clear liquid diet IVF Follow H/H, transfuse prn Hypokalemia Replace Check K tonight BMP in AM 
 
 
Notes, labs, VS, diagnostic testing reviewed Time spent with pt 20 min DVT Prophylaxis: SCDs Plan of Care Discussed with: Supervising MD Dr. Mookie Church, care team, pt Fernando Curran NP

## 2018-12-31 NOTE — PROGRESS NOTES
Problem: Nutrition Deficit Goal: *Optimize nutritional status Nutrition Reason for assessment: Referral received from nursing admission Malnutrition Screening Tool for recently lost 14-23# without trying and eating poorly due to decreased appetite. Assessment:  
Diet order(s): 12-31: NPO, then clear liquid, 1-2 NPOFood/Nutrition History: PMH of ETOH abuse. Presented with rectal bleeding. Plan for colonoscopy on 1-2-19. 40# weight loss in 4 months noted in GI consult. However this is not substantiated per EMR. Pt is historically a poor historian. Deferred interview at this time d/t diet status and pt with AMS. Per RD note of 9-11-17: The patient reports that she has been eating \"too good\" since admitted. She states that she does not eat well at home because she is \"lazy\". Per patient, she can't afford ensure enlive. She states that she has had recent weight loss (reports a weight of 105 pound a couple of months ago.) Per RD note of 61-15: 62year old lady with a h/o alcohol and tobacco abuses admitted with desire to stop drinking. She reports a 20-25 pound weight loss over the last 9 months, however the weights recorded from MD visits indicate that her weight was stable from 3/2014 through 8/2015 ranging between 120-123 pounds. She indicates that she lost weight \"because I'm poor and I can't get help. What do you expect on $1000 a month? I eat tuna and crackers or canned salmon. I can't get to places where they give food because I have to pay people to drive me and I can't afford that. \" She also indicated that she lost her appetite and found food unappealing. Her skin is intact and she turns herself. Although her intake is adequate, I offered her Ensure and she requested that it come with all of her trays Anthropometrics: Height: 5' 5\" (165.1 cm), Weight: 54.4 kg (120 lb)-no source specified,  Body mass index is 19.97 kg/m². BMI class of normal weight. Weight hx per EMR ( Based on connect care functionality, RD cannot know if these weight are actual versus stated): WT / BMI WEIGHT  
12/30/2018 120 lb  
7/4/2018 115 lb  
9/28/2017 120 lb  
9/24/2017 121 lb  
9/14/2017 121 lb 8 oz  
5/29/2017 110 lb Macronutrient needs: EER:  1605-8930 kcal /day (25-30 kcal/kg listed BW) EPR:  67-83 grams protein/day (1.2-1.5 grams/kg IBW) Intake/Comparative Standards: Current clear liquid diet does not meet kcal or protein needs Nutrition Diagnosis: Predicted inadequate oral intake r/t inability to consume sufficient oral intake as evidenced by ETOH abuse and likely replacement of food with ETOH, current diet limited to nutritionally inadequate clear liquid diet  d/t GI bleed Intervention: 
Order placed for daily weights. Meals and snacks:  Await progression of p.o. diet as GI status allows. Nutrition supplement therapy: Ensure clear tid with clear liquids, then Ensure Enlive tid once diet is progressed to at least full liquids. Discharge nutrition plan: Too soon to determine. Noted  consult for food assistance with referral to [facility]. Javier Santoyo, [address] [place] [address], [address]

## 2018-12-31 NOTE — CONSULTS
[facility] Consult Note       Primary GI Physician: None    Referring Provider:  Dr. Kandice Yost Date:  12/31/2018    Admit Date:  12/30/2018    Chief Complaint:  Rectal bleeding    Subjective:     History of Present Illness:  Patient is a 59 y.o. female with PMH of (but not limited to) Asthma, ADHD,Seizures, scoliosis, hemorrhoids with previous hemorrhoidectomy and history of ETOH abuse, who is seen in consultation at the request of Dr. Madelyn Cates for rectal bleeding. Ms. Lavonne Last was admitted with acute encephalopathy, PASQUALE, Hypernatremia and Seizures. She has been having daily rectal bleeding with a BM for years. She has known hemorrhoids and has had a previous hemorrhoidectomy. She has a BM once daily unchanged. She has epigastric abdominal pain and 40 pound weight loss of unknown origin in 4 mos. She states she has had decreased appetite. She complains of dysphagia to the supersternal notch to solids only relieved with swallowing that is new. She denies any melena. She has some GERD, but denies any N&V or hematemesis. She does not take H2 blockers or PPI. She does take ibuprofen regularly. She denies any ETOH to me, but told Dr. Zackary Snellen that she was binge drinking recently. She does admit to smoking. She denies any anticoagulants. Her family history is unknown. She has never had a previous colonoscopy (except possible flex sig in her 20's) and never undergone a previous EGD. She has been placed on ETOH withdrawal protocal, Rocephin and is currently NPO. Labs: 12/31/18 WBC 7.9, Hgb 11.7 (18.1 on 12/30), MCV 93.2, RDW 14.2, plt 230, Sodium 144, potassium 2.6, glucose 97, BUN 13, creat 0.90, T bili 1.1, albumin 2.9, ALT 18, AST 29, AP 83, ammonia 12/30 15, lipase 138    CT abdomen and pelvis with contrast 12/30/18     Clinical history: Lower abdominal pain for about 10 days. .     Comparison: None     TECHNIQUE: CT imaging was performed of the abdomen and pelvis following the  uncomplicated administration of intravenous contrast (Isovue 370, 100 mL). Oral  contrast was administered. Radiation dose reduction techniques were used for  this study:  Our CT scanners use one or all of the following: Automated exposure  control, adjustment of the mA and/or kVp according to patient's size, iterative  reconstruction.     FINDINGS:     Minimal dependent subsegmental atelectasis at the right lung base.     Abdomen findings:     The liver, gallbladder, pancreas, spleen, and the right adrenal gland are  unremarkable. There is a 10 mm left adrenal nodule, incompletely characterized  but possibly an adenoma.     Abdominal aorta is normal in course and caliber. There are multiple small renal  cysts. No hydronephrosis.      There is suggestion of wall thickening of the gastric pylorus. Small bowel loops  are normal in caliber. No small bowel obstruction. No abnormality enlarged lymph  nodes are identified by size criterion.     Pelvic findings:     Urinary bladder is underdistended. There is sigmoid diverticulosis without  diverticulitis. The colon is normal in course and caliber. Appendix appears  within normal limits.     There is no free air or free fluid. There is apex left curvature of the lumbar  spine.        IMPRESSION  IMPRESSION:     1. Mild wall thickening of the gastric pylorus, likely gastritis.     2. Sigmoid diverticulosis. No evidence of diverticulitis, colitis or  appendicitis. No bowel obstruction. No hydronephrosis.     3.  A 10 mm left adrenal nodule, incompletely characterized but possibly an  adenoma.       PMH:  Past Medical History:   Diagnosis Date    Alcohol abuse 10/31/2015    Alcohol withdrawal ([facility] [address] 10/31/2015    Asthma     Other ill-defined conditions(799.89)     lumbar and thoracic scoliosis    Other ill-defined conditions(799.89)     irregular heart rate    Psychiatric disorder     ADHD    Seizures (HCC)        PSH:  Past Surgical History:   Procedure Laterality Date    HX GYN hysterectomy    HX HEENT      sinus surgery. deviated septum.  HX ORTHOPAEDIC      right knee       Allergies:  No Known Allergies    Home Medications:  Prior to Admission medications    Medication Sig Start Date End Date Taking? Authorizing Provider   ibuprofen (MOTRIN) 600 mg tablet Take 1 Tab by mouth three (3) times daily. 9/14/17  Yes Thomas Diaz MD   LORazepam (ATIVAN) 1 mg tablet Take 1 Tab by mouth every six (6) hours as needed for Anxiety. Max Daily Amount: 4 mg. 5/29/17  Yes Peggy Partida MD   gabapentin (NEURONTIN) 400 mg capsule Take 400 mg by mouth three (3) times daily. Yes Katarzyna Alexander MD   diclofenac EC (VOLTAREN) 75 mg EC tablet Take 1 Tab by mouth two (2) times a day. 9/28/17   Franklin Siddiqui MD   HYDROcodone-acetaminophen [facility]) 5-325 mg per tablet Take 1 Tab by mouth every eight (8) hours as needed for Pain. Max Daily Amount: 3 Tabs. 9/28/17   Franklin Siddiqui MD   oxyCODONE IR (ROXICODONE) 10 mg tab immediate release tablet Take 1 Tab by mouth every four (4) hours as needed. Max Daily Amount: 60 mg. 9/14/17   Thomas Diaz MD   pantoprazole (PROTONIX) 40 mg tablet Take 1 Tab by mouth Daily (before breakfast). Indications: while taking high dose ibuprofen for rib fractures 9/14/17   Thomas Diaz MD   senna-docusate (PERICOLACE) 8.6-50 mg per tablet Take 1 Tab by mouth two (2) times a day.  9/14/17   Colby Moore MD       [facility] Medications:  Current Facility-Administered Medications   Medication Dose Route Frequency    pantoprazole (PROTONIX) 40 mg in sodium chloride 0.9% 10 mL injection  40 mg IntraVENous Q12H    tuberculin injection 5 Units  5 Units IntraDERMal ONCE    sodium chloride (NS) flush 5-10 mL  5-10 mL IntraVENous Q8H    sodium chloride (NS) flush 5-10 mL  5-10 mL IntraVENous PRN    morphine injection 2 mg  2 mg IntraVENous Q4H PRN    naloxone (NARCAN) injection 0.4 mg  0.4 mg IntraVENous PRN    ondansetron (ZOFRAN) injection 4 mg  4 mg IntraVENous Q4H PRN    LORazepam (ATIVAN) 2 mg/mL injection 1 mg  1 mg IntraVENous Q4H PRN    0.9% sodium chloride 1,000 mL with mvi, adult no. 4 with vit K 10 mL, thiamine 079 mg, folic acid 1 mg infusion   IntraVENous Q24H    cefTRIAXone (ROCEPHIN) 1 g in 0.9% sodium chloride (MBP/ADV) 50 mL  1 g IntraVENous Q24H    0.9% sodium chloride infusion  100 mL/hr IntraVENous CONTINUOUS    0.9% sodium chloride infusion  1 mL/kg/hr IntraVENous PRN    0.9% sodium chloride infusion  1 mL/kg/hr IntraVENous PRN       Social History:  Social History     Tobacco Use    Smoking status: Current Every Day Smoker     Packs/day: 0.25    Smokeless tobacco: Never Used   Substance Use Topics    Alcohol use: No     Alcohol/week: 0.0 oz       Pt denies any history of drug use, blood transfusions, or tattoos. Family History:  No family history on file. Review of Systems:  A detailed 10 system ROS is obtained, with pertinent positives as listed above. All others are negative. Diet:  NPO    Objective:     Physical Exam:  Vitals:  Visit Vitals  /76 (BP 1 Location: Right arm, BP Patient Position: Sitting)   Pulse (!) 101   Temp 98.2 °F (36.8 °C)   Resp 20   Ht 5' 5\" (1.651 m)   Wt 54.4 kg (120 lb)   SpO2 92%   BMI 19.97 kg/m²     Gen:  Pt is alert, cooperative, no acute distress THIN; MULTIPLE CONTUSIONS  Skin:  Extremities and face reveal no rashes. HEENT: Sclerae anicteric. Extra-occular muscles are intact. No oral ulcers. No abnormal pigmentation of the lips. The neck is supple. Cardiovascular: Regular rate and rhythm. No murmurs, gallops, or rubs. Respiratory:  Comfortable breathing with no accessory muscle use. Clear breath sounds anteriorly with no wheezes, rales, or rhonchi. GI:  Abdomen nondistended, soft, and EPIGASTRIC TENDERNESS  Normal active bowel sounds. No enlargement of the liver or spleen. No masses palpable. Rectal:  Deferred  Musculoskeletal:  No pitting edema of the lower legs. MULTIPLE CONTUSIONS  Neurological:  Gross memory appears intact. Patient is alert and oriented. WITHOUT ASTERIXIS  Psychiatric:  Mood appears appropriate with judgement intact. Lymphatic:  No cervical or supraclavicular adenopathy. Laboratory:    Recent Labs     12/31/18  0627 12/30/18  1823 12/30/18  1303   WBC 7.9  --  11.8*   HGB 11.7  --  18.1*   HCT 35.7*  --  54.8*     --  380   MCV 93.2  --  92.9     --  150*   K 2.6*  --  3.7   *  --  113*   CO2 26  --  28   BUN 13  --  32*   CREA 0.90  --  1.80*   CA 8.0*  --  9.6   MG  --  2.3  --    GLU 97  --  112*   AP 83  --  122   SGOT 29  --  60*   ALT 18  --  29   TBILI 1.1  --  0.8   ALB 2.9*  --  4.4   TP 5.4*  --  8.6*   LPSE  --   --  138          Assessment:     Active Problems:    Acute encephalopathy (12/30/2018)      59 y.o. female with PMH of (but not limited to) Asthma, ADHD,Seizures, scoliosis, hemorrhoids with previous hemorrhoidectomy and history of ETOH abuse, who is seen in consultation at the request of Dr. Francine Jung for rectal bleeding and heme positive stool. She has a associated 40 pound weight loss in 4 mos and no previous colonoscopy. This may represent known hemorrhoidal bleeding, but friable polyp/mass is in the differential.    She has epigastric abdominal pain with a CT of the abdomen/pelvis revealing thickening of the pylorus. She does have dysphagia and history of binge drinking. This may represent gastritis/esophagitis/ and possible malignancy. Plan: 1. Plan for colonoscopy bowel cleanse on 1/1/19 and colonoscopy on 1/2/19 with Dr. Katerine Bosch. 2.EGD on Wed 1/2/19  3. Agree with PPI  4. Serial H&H and transfuse as needed  5. Agree with ETOH withdrawal protocal  6. Avoid NSAIDS  7. Call for increasing bleeding and may need to do procedure more emergently. 8.Clear liquid diet nothing red  9. PT INR    Moisés Snell.  [address]   [facility]    Patient is seen and examined in collaboration with Dr. Kristofer Diallo. Assessment and plan as per Dr. Kristofer Diallo. ATTENDING NOTE:  I have seen the patient and agree with the above assessment and plan. Patient with dysphagia, epigastric pain and rectal bleeding. Will prep tomorrow for EGD and colonoscopy on Wednesday.      Boaz Griffiths MD

## 2018-12-31 NOTE — PROGRESS NOTES
Problem: Falls - Risk of 
Goal: *Absence of Falls Document Jose Galvez Fall Risk and appropriate interventions in the flowsheet. Outcome: Progressing Towards Goal 
Fall Risk Interventions: 
Mobility Interventions: Bed/chair exit alarm, Patient to call before getting OOB Medication Interventions: Patient to call before getting OOB Elimination Interventions: Call light in reach, Bed/chair exit alarm History of Falls Interventions: Bed/chair exit alarm

## 2018-12-31 NOTE — PROGRESS NOTES
Hourly rounding completed on this shift. No new complaints at this time. All needs met. Pt is currently resting in bed quietly. Will continue to monitor and give report to oncoming nurse.

## 2018-12-31 NOTE — H&P (VIEW-ONLY)
[facility] Consult Note Primary GI Physician: None Referring Provider:  Dr. Guille Simms Consult Date:  12/31/2018 Admit Date:  12/30/2018 Chief Complaint:  Rectal bleeding Subjective:  
 
History of Present Illness:  Patient is a 59 y.o. female with PMH of (but not limited to) Asthma, ADHD,Seizures, scoliosis, hemorrhoids with previous hemorrhoidectomy and history of ETOH abuse, who is seen in consultation at the request of Dr. Guille Simms for rectal bleeding. Ms. Ilda Gaona was admitted with acute encephalopathy, PASQUALE, Hypernatremia and Seizures. She has been having daily rectal bleeding with a BM for years. She has known hemorrhoids and has had a previous hemorrhoidectomy. She has a BM once daily unchanged. She has epigastric abdominal pain and 40 pound weight loss of unknown origin in 4 mos. She states she has had decreased appetite. She complains of dysphagia to the supersternal notch to solids only relieved with swallowing that is new. She denies any melena. She has some GERD, but denies any N&V or hematemesis. She does not take H2 blockers or PPI. She does take ibuprofen regularly. She denies any ETOH to me, but told Dr. Conrad Severs that she was binge drinking recently. She does admit to smoking. She denies any anticoagulants. Her family history is unknown. She has never had a previous colonoscopy (except possible flex sig in her 20's) and never undergone a previous EGD. She has been placed on ETOH withdrawal protocal, Rocephin and is currently NPO. Labs: 12/31/18 WBC 7.9, Hgb 11.7 (18.1 on 12/30), MCV 93.2, RDW 14.2, plt 230, Sodium 144, potassium 2.6, glucose 97, BUN 13, creat 0.90, T bili 1.1, albumin 2.9, ALT 18, AST 29, AP 83, ammonia 12/30 15, lipase 138 CT abdomen and pelvis with contrast 12/30/18 
  
Clinical history: Lower abdominal pain for about 10 days. . 
  
Comparison: None 
  
TECHNIQUE: CT imaging was performed of the abdomen and pelvis following the 
 uncomplicated administration of intravenous contrast (Isovue 370, 100 mL). Oral 
contrast was administered. Radiation dose reduction techniques were used for 
this study:  Our CT scanners use one or all of the following: Automated exposure 
control, adjustment of the mA and/or kVp according to patient's size, iterative 
reconstruction. 
  
FINDINGS: 
  
Minimal dependent subsegmental atelectasis at the right lung base. 
  
Abdomen findings: 
  
The liver, gallbladder, pancreas, spleen, and the right adrenal gland are 
unremarkable. There is a 10 mm left adrenal nodule, incompletely characterized 
but possibly an adenoma. 
  
Abdominal aorta is normal in course and caliber. There are multiple small renal 
cysts. No hydronephrosis.  
  
There is suggestion of wall thickening of the gastric pylorus. Small bowel loops 
are normal in caliber. No small bowel obstruction. No abnormality enlarged lymph 
nodes are identified by size criterion. 
  
Pelvic findings: 
  
Urinary bladder is underdistended. There is sigmoid diverticulosis without 
diverticulitis. The colon is normal in course and caliber. Appendix appears 
within normal limits. 
  
There is no free air or free fluid. There is apex left curvature of the lumbar 
spine. 
  
  
IMPRESSION IMPRESSION: 
  
1. Mild wall thickening of the gastric pylorus, likely gastritis. 
  
2. Sigmoid diverticulosis. No evidence of diverticulitis, colitis or 
appendicitis. No bowel obstruction. No hydronephrosis. 
  
3. A 10 mm left adrenal nodule, incompletely characterized but possibly an 
adenoma. 
  
 
PMH: 
Past Medical History:  
Diagnosis Date  Alcohol abuse 10/31/2015  Alcohol withdrawal ([address] 10/31/2015  Asthma  Other ill-defined conditions(799.89)   
 lumbar and thoracic scoliosis  Other ill-defined conditions(799.89) irregular heart rate  Psychiatric disorder ADHD  Seizures ([address] PSH: 
Past Surgical History: Procedure Laterality Date  HX GYN    
 hysterectomy  HX HEENT    
 sinus surgery. deviated septum.  HX ORTHOPAEDIC    
 right knee Allergies: 
No Known Allergies Home Medications: 
Prior to Admission medications Medication Sig Start Date End Date Taking? Authorizing Provider  
ibuprofen (MOTRIN) 600 mg tablet Take 1 Tab by mouth three (3) times daily. 9/14/17  Yes Tomasz Diaz MD  
LORazepam (ATIVAN) 1 mg tablet Take 1 Tab by mouth every six (6) hours as needed for Anxiety. Max Daily Amount: 4 mg. 5/29/17  Yes El Sauer MD  
gabapentin (NEURONTIN) 400 mg capsule Take 400 mg by mouth three (3) times daily. Yes Other, MD Katarzyna  
diclofenac EC (VOLTAREN) 75 mg EC tablet Take 1 Tab by mouth two (2) times a day. 9/28/17   John Sprague MD  
HYDROcodone-acetaminophen [facility]) 5-325 mg per tablet Take 1 Tab by mouth every eight (8) hours as needed for Pain. Max Daily Amount: 3 Tabs. 9/28/17   John Sprague MD  
oxyCODONE IR (ROXICODONE) 10 mg tab immediate release tablet Take 1 Tab by mouth every four (4) hours as needed. Max Daily Amount: 60 mg. 9/14/17   Tomasz Diaz MD  
pantoprazole (PROTONIX) 40 mg tablet Take 1 Tab by mouth Daily (before breakfast). Indications: while taking high dose ibuprofen for rib fractures 9/14/17   Tomasz Diaz MD  
senna-docusate (PERICOLACE) 8.6-50 mg per tablet Take 1 Tab by mouth two (2) times a day. 9/14/17   Robert Dee MD  
 
 
Hospital Medications: 
Current Facility-Administered Medications Medication Dose Route Frequency  pantoprazole (PROTONIX) 40 mg in sodium chloride 0.9% 10 mL injection  40 mg IntraVENous Q12H  
 tuberculin injection 5 Units  5 Units IntraDERMal ONCE  
 sodium chloride (NS) flush 5-10 mL  5-10 mL IntraVENous Q8H  
 sodium chloride (NS) flush 5-10 mL  5-10 mL IntraVENous PRN  
 morphine injection 2 mg  2 mg IntraVENous Q4H PRN  
  naloxone (NARCAN) injection 0.4 mg  0.4 mg IntraVENous PRN  
 ondansetron (ZOFRAN) injection 4 mg  4 mg IntraVENous Q4H PRN  
 LORazepam (ATIVAN) 2 mg/mL injection 1 mg  1 mg IntraVENous Q4H PRN  
 0.9% sodium chloride 1,000 mL with mvi, adult no. 4 with vit K 10 mL, thiamine 228 mg, folic acid 1 mg infusion   IntraVENous Q24H  cefTRIAXone (ROCEPHIN) 1 g in 0.9% sodium chloride (MBP/ADV) 50 mL  1 g IntraVENous Q24H  
 0.9% sodium chloride infusion  100 mL/hr IntraVENous CONTINUOUS  
 0.9% sodium chloride infusion  1 mL/kg/hr IntraVENous PRN  
 0.9% sodium chloride infusion  1 mL/kg/hr IntraVENous PRN Social History: 
Social History Tobacco Use  Smoking status: Current Every Day Smoker Packs/day: 0.25  Smokeless tobacco: Never Used Substance Use Topics  Alcohol use: No  
  Alcohol/week: 0.0 oz Pt denies any history of drug use, blood transfusions, or tattoos. Family History: No family history on file. Review of Systems: A detailed 10 system ROS is obtained, with pertinent positives as listed above. All others are negative. Diet:  NPO Objective:  
 
Physical Exam: 
Vitals: 
Visit Vitals /76 (BP 1 Location: Right arm, BP Patient Position: Sitting) Pulse (!) 101 Temp 98.2 °F (36.8 °C) Resp 20 Ht 5' 5\" (1.651 m) Wt 54.4 kg (120 lb) SpO2 92% BMI 19.97 kg/m² Gen:  Pt is alert, cooperative, no acute distress THIN; MULTIPLE CONTUSIONS Skin:  Extremities and face reveal no rashes. HEENT: Sclerae anicteric. Extra-occular muscles are intact. No oral ulcers. No abnormal pigmentation of the lips. The neck is supple. Cardiovascular: Regular rate and rhythm. No murmurs, gallops, or rubs. Respiratory:  Comfortable breathing with no accessory muscle use. Clear breath sounds anteriorly with no wheezes, rales, or rhonchi.  
GI:  Abdomen nondistended, soft, and EPIGASTRIC TENDERNESS  Normal active bowel sounds. No enlargement of the liver or spleen. No masses palpable. Rectal:  Deferred Musculoskeletal:  No pitting edema of the lower legs. MULTIPLE CONTUSIONS Neurological:  Gross memory appears intact. Patient is alert and oriented. WITHOUT ASTERIXIS Psychiatric:  Mood appears appropriate with judgement intact. Lymphatic:  No cervical or supraclavicular adenopathy. Laboratory:   
Recent Labs 12/31/18 
4871 12/30/18 
1823 12/30/18 
1303 WBC 7.9  --  11.8* HGB 11.7  --  18.1* HCT 35.7*  --  54.8*  
  --  380 MCV 93.2  --  92.9   --  150*  
K 2.6*  --  3.7 *  --  113* CO2 26  --  28 BUN 13  --  32* CREA 0.90  --  1.80* CA 8.0*  --  9.6 MG  --  2.3  --   
GLU 97  --  112* AP 83  --  122 SGOT 29  --  60* ALT 18  --  29  
TBILI 1.1  --  0.8 ALB 2.9*  --  4.4 TP 5.4*  --  8.6* LPSE  --   --  138 Assessment:  
 
Active Problems: 
  Acute encephalopathy (12/30/2018) 59 y.o. female with PMH of (but not limited to) Asthma, ADHD,Seizures, scoliosis, hemorrhoids with previous hemorrhoidectomy and history of ETOH abuse, who is seen in consultation at the request of Dr. Cathy Tiwari for rectal bleeding and heme positive stool. She has a associated 40 pound weight loss in 4 mos and no previous colonoscopy. This may represent known hemorrhoidal bleeding, but friable polyp/mass is in the differential. 
 
She has epigastric abdominal pain with a CT of the abdomen/pelvis revealing thickening of the pylorus. She does have dysphagia and history of binge drinking. This may represent gastritis/esophagitis/ and possible malignancy. Plan: 1. Plan for colonoscopy bowel cleanse on 1/1/19 and colonoscopy on 1/2/19 with Dr. Sirena Hauser. 2.EGD on Wed 1/2/19 3. Agree with PPI 4. Serial H&H and transfuse as needed 5. Agree with ETOH withdrawal protocal 
6. Avoid NSAIDS 7. Call for increasing bleeding and may need to do procedure more emergently. 8.Clear liquid diet nothing red 9. PT INR Merrilyn Lies. Rosalieanay Betancur [address] [facility] Patient is seen and examined in collaboration with Dr. Dimple Juárez. Assessment and plan as per Dr. Dimple Juárez. ATTENDING NOTE:  I have seen the patient and agree with the above assessment and plan. Patient with dysphagia, epigastric pain and rectal bleeding. Will prep tomorrow for EGD and colonoscopy on Wednesday.   
 
Anselmo Zelaya MD

## 2018-12-31 NOTE — PROGRESS NOTES
Interdisciplinary Rounds completed 12/31/18. Nursing, Case Management, Physician and PT present. Plan of care reviewed and updated.

## 2018-12-31 NOTE — PROGRESS NOTES
Hourly rounds performed, all needs met. Pain medication and ativan given per [facility]. Will continue to monitor pt and give report to oncoming nurse.

## 2018-12-31 NOTE — PROGRESS NOTES
Chart screened by  for discharge planning. Per consult, pt needs food assistance. Referral emailed to [facility] [employer] [facility] in [place]. Cm will follow therapy recommendations. Please consult  if any new issues arise. Care Management Interventions PCP Verified by CM: Yes Transition of Care Consult (CM Consult): Discharge Planning Physical Therapy Consult: Yes Occupational Therapy Consult: Yes Current Support Network: Own Home Confirm Follow Up Transport: Family Plan discussed with Pt/Family/Caregiver: Yes Freedom of Choice Offered: Yes Discharge Location Discharge Placement: Home

## 2019-01-01 LAB
ALBUMIN SERPL-MCNC: 2.9 G/DL (ref 3.2–4.6)
ALBUMIN/GLOB SERPL: 1.1 {RATIO} (ref 1.2–3.5)
ALP SERPL-CCNC: 84 U/L (ref 50–136)
ALT SERPL-CCNC: 16 U/L (ref 12–65)
ANION GAP SERPL CALC-SCNC: 7 MMOL/L (ref 7–16)
AST SERPL-CCNC: 24 U/L (ref 15–37)
BASOPHILS # BLD: 0 K/UL (ref 0–0.2)
BASOPHILS NFR BLD: 0 % (ref 0–2)
BILIRUB SERPL-MCNC: 0.8 MG/DL (ref 0.2–1.1)
BUN SERPL-MCNC: 7 MG/DL (ref 8–23)
CALCIUM SERPL-MCNC: 8.1 MG/DL (ref 8.3–10.4)
CHLORIDE SERPL-SCNC: 110 MMOL/L (ref 98–107)
CO2 SERPL-SCNC: 29 MMOL/L (ref 21–32)
CREAT SERPL-MCNC: 0.5 MG/DL (ref 0.6–1)
DIFFERENTIAL METHOD BLD: ABNORMAL
EOSINOPHIL # BLD: 0.2 K/UL (ref 0–0.8)
EOSINOPHIL NFR BLD: 4 % (ref 0.5–7.8)
ERYTHROCYTE [DISTWIDTH] IN BLOOD BY AUTOMATED COUNT: 12.2 % (ref 11.9–14.6)
GLOBULIN SER CALC-MCNC: 2.6 G/DL (ref 2.3–3.5)
GLUCOSE SERPL-MCNC: 91 MG/DL (ref 65–100)
HCT VFR BLD AUTO: 35.1 % (ref 35.8–46.3)
HGB BLD-MCNC: 11.8 G/DL (ref 11.7–15.4)
IMM GRANULOCYTES # BLD: 0 K/UL (ref 0–0.5)
IMM GRANULOCYTES NFR BLD AUTO: 0 % (ref 0–5)
INR PPP: 1
LYMPHOCYTES # BLD: 2.4 K/UL (ref 0.5–4.6)
LYMPHOCYTES NFR BLD: 44 % (ref 13–44)
MAGNESIUM SERPL-MCNC: 2 MG/DL (ref 1.8–2.4)
MCH RBC QN AUTO: 31.5 PG (ref 26.1–32.9)
MCHC RBC AUTO-ENTMCNC: 33.6 G/DL (ref 31.4–35)
MCV RBC AUTO: 93.6 FL (ref 79.6–97.8)
MONOCYTES # BLD: 0.4 K/UL (ref 0.1–1.3)
MONOCYTES NFR BLD: 8 % (ref 4–12)
NEUTS SEG # BLD: 2.4 K/UL (ref 1.7–8.2)
NEUTS SEG NFR BLD: 44 % (ref 43–78)
NRBC # BLD: 0 K/UL (ref 0–0.2)
PLATELET # BLD AUTO: 225 K/UL (ref 150–450)
PMV BLD AUTO: 10.6 FL (ref 9.4–12.3)
POTASSIUM SERPL-SCNC: 3 MMOL/L (ref 3.5–5.1)
PROT SERPL-MCNC: 5.5 G/DL (ref 6.3–8.2)
PROTHROMBIN TIME: 13.2 SEC (ref 11.7–14.5)
RBC # BLD AUTO: 3.75 M/UL (ref 4.05–5.2)
SODIUM SERPL-SCNC: 146 MMOL/L (ref 136–145)
WBC # BLD AUTO: 5.5 K/UL (ref 4.3–11.1)

## 2019-01-01 PROCEDURE — 85025 COMPLETE CBC W/AUTO DIFF WBC: CPT

## 2019-01-01 PROCEDURE — 74011250636 HC RX REV CODE- 250/636: Performed by: NURSE PRACTITIONER

## 2019-01-01 PROCEDURE — 74011000250 HC RX REV CODE- 250: Performed by: NURSE PRACTITIONER

## 2019-01-01 PROCEDURE — 74011250637 HC RX REV CODE- 250/637: Performed by: NURSE PRACTITIONER

## 2019-01-01 PROCEDURE — 83735 ASSAY OF MAGNESIUM: CPT

## 2019-01-01 PROCEDURE — 85610 PROTHROMBIN TIME: CPT

## 2019-01-01 PROCEDURE — 77030020250 HC SOL INJ D 5% LFCR 1000ML BG LF

## 2019-01-01 PROCEDURE — 36415 COLL VENOUS BLD VENIPUNCTURE: CPT

## 2019-01-01 PROCEDURE — 97165 OT EVAL LOW COMPLEX 30 MIN: CPT

## 2019-01-01 PROCEDURE — 74011000258 HC RX REV CODE- 258: Performed by: NURSE PRACTITIONER

## 2019-01-01 PROCEDURE — 80053 COMPREHEN METABOLIC PANEL: CPT

## 2019-01-01 PROCEDURE — 74011250637 HC RX REV CODE- 250/637: Performed by: FAMILY MEDICINE

## 2019-01-01 PROCEDURE — 65660000000 HC RM CCU STEPDOWN

## 2019-01-01 PROCEDURE — C9113 INJ PANTOPRAZOLE SODIUM, VIA: HCPCS | Performed by: NURSE PRACTITIONER

## 2019-01-01 RX ORDER — DEXTROSE MONOHYDRATE 50 MG/ML
75 INJECTION, SOLUTION INTRAVENOUS CONTINUOUS
Status: DISCONTINUED | OUTPATIENT
Start: 2019-01-01 | End: 2019-01-02

## 2019-01-01 RX ORDER — IBUPROFEN 200 MG
1 TABLET ORAL DAILY
Status: DISCONTINUED | OUTPATIENT
Start: 2019-01-02 | End: 2019-01-01

## 2019-01-01 RX ORDER — POTASSIUM CHLORIDE 20 MEQ/1
40 TABLET, EXTENDED RELEASE ORAL DAILY
Status: DISCONTINUED | OUTPATIENT
Start: 2019-01-01 | End: 2019-01-02 | Stop reason: HOSPADM

## 2019-01-01 RX ORDER — POTASSIUM CHLORIDE 14.9 MG/ML
20 INJECTION INTRAVENOUS
Status: COMPLETED | OUTPATIENT
Start: 2019-01-01 | End: 2019-01-01

## 2019-01-01 RX ORDER — IBUPROFEN 200 MG
1 TABLET ORAL DAILY
Status: DISCONTINUED | OUTPATIENT
Start: 2019-01-01 | End: 2019-01-02 | Stop reason: HOSPADM

## 2019-01-01 RX ADMIN — MORPHINE SULFATE 2 MG: 2 INJECTION, SOLUTION INTRAMUSCULAR; INTRAVENOUS at 06:08

## 2019-01-01 RX ADMIN — LORAZEPAM 1 MG: 2 INJECTION, SOLUTION INTRAMUSCULAR; INTRAVENOUS at 18:02

## 2019-01-01 RX ADMIN — POTASSIUM CHLORIDE 20 MEQ: 14.9 INJECTION, SOLUTION INTRAVENOUS at 10:24

## 2019-01-01 RX ADMIN — POTASSIUM CHLORIDE 40 MEQ: 20 TABLET, EXTENDED RELEASE ORAL at 09:25

## 2019-01-01 RX ADMIN — LORAZEPAM 1 MG: 2 INJECTION, SOLUTION INTRAMUSCULAR; INTRAVENOUS at 11:41

## 2019-01-01 RX ADMIN — DEXTROSE MONOHYDRATE 75 ML/HR: 5 INJECTION, SOLUTION INTRAVENOUS at 09:30

## 2019-01-01 RX ADMIN — LORAZEPAM 1 MG: 2 INJECTION, SOLUTION INTRAMUSCULAR; INTRAVENOUS at 21:32

## 2019-01-01 RX ADMIN — SODIUM CHLORIDE 40 MG: 9 INJECTION, SOLUTION INTRAMUSCULAR; INTRAVENOUS; SUBCUTANEOUS at 21:31

## 2019-01-01 RX ADMIN — MORPHINE SULFATE 2 MG: 2 INJECTION, SOLUTION INTRAMUSCULAR; INTRAVENOUS at 21:32

## 2019-01-01 RX ADMIN — CEFTRIAXONE SODIUM 1 G: 1 INJECTION, POWDER, FOR SOLUTION INTRAMUSCULAR; INTRAVENOUS at 17:38

## 2019-01-01 RX ADMIN — MORPHINE SULFATE 2 MG: 2 INJECTION, SOLUTION INTRAMUSCULAR; INTRAVENOUS at 11:37

## 2019-01-01 RX ADMIN — Medication 10 ML: at 21:32

## 2019-01-01 RX ADMIN — THIAMINE HYDROCHLORIDE: 100 INJECTION, SOLUTION INTRAMUSCULAR; INTRAVENOUS at 18:26

## 2019-01-01 RX ADMIN — LORAZEPAM 1 MG: 2 INJECTION, SOLUTION INTRAMUSCULAR; INTRAVENOUS at 06:05

## 2019-01-01 RX ADMIN — Medication 10 ML: at 05:46

## 2019-01-01 RX ADMIN — POLYETHYLENE GLYCOL 3350 238 G: 17 POWDER, FOR SOLUTION ORAL at 17:38

## 2019-01-01 RX ADMIN — MORPHINE SULFATE 2 MG: 2 INJECTION, SOLUTION INTRAMUSCULAR; INTRAVENOUS at 18:01

## 2019-01-01 RX ADMIN — Medication 10 ML: at 14:56

## 2019-01-01 RX ADMIN — POTASSIUM CHLORIDE 20 MEQ: 14.9 INJECTION, SOLUTION INTRAVENOUS at 14:00

## 2019-01-01 RX ADMIN — SODIUM CHLORIDE 40 MG: 9 INJECTION, SOLUTION INTRAMUSCULAR; INTRAVENOUS; SUBCUTANEOUS at 09:24

## 2019-01-01 RX ADMIN — BISACODYL 10 MG: 5 TABLET, COATED ORAL at 17:39

## 2019-01-01 NOTE — PROGRESS NOTES
Patient with dysphagia, epigastric pain and rectal bleeding. Will prep today for EGD and colonoscopy on Wednesday.

## 2019-01-01 NOTE — PROGRESS NOTES
Occupational Therapy Note: 
OT orders received, chart reviewed, and evaluation attempted. Patient requesting OT to check back later, as pt wishes to sleep at this time. Will check back as schedule permits and patient is available to initiate occupational therapy evaluation.   
Thank you for this consult,  
Alison Taveras, OTR/L

## 2019-01-01 NOTE — PROGRESS NOTES
Occupational Therapy Note: 
OT evaluation re-attempted. Patient eating lunch at this time, requesting OT check back later. Will check back as schedule permits and patient is available to initiate occupational therapy evaluation.   
Thank you for this consult,  
Alison Taveras, OTR/L

## 2019-01-01 NOTE — PROGRESS NOTES
Problem: Self Care Deficits Care Plan (Adult) Goal: *Acute Goals and Plan of Care (Insert Text) OCCUPATIONAL THERAPY: Initial Assessment, Discharge and PM 1/1/2019INPATIENT: Hospital Day: 3 Payor: [facility] [place] MEDICARE / Plan: [place] MED[place]O / Product Type: Managed Care Medicare /  
  
NAME/AGE/GENDER: Joeseph Libman is a 59 y.o. female PRIMARY DIAGNOSIS:  Acute encephalopathy Acute encephalopathy Acute encephalopathy Procedure(s) (LRB): ESOPHAGOGASTRODUODENOSCOPY (EGD) (N/A) COLONOSCOPY  BMI 20/ROOM 607 (N/A) ICD-10: Treatment Diagnosis: · Dizziness and Giddiness (R42) Precautions/Allergies: 
   Patient has no known allergies. ASSESSMENT:  
Ms. Jhon Denton is a 59 y.o. female admitted with the above. At baseline, pt lives alone and is independent with ADLs and Luis Carlos for functional mobility with a cane. Upon arrival, pt is alert and agreeable to OT evaluation. General UE screen revealed AROM generally decreased and strength generally decreased in BUEs. Sensation intact to light touch in BUEs. Pt completes bed mobility with SBA and functional mobility around room and in bathroom with SBA and RW. Pt donned/doffed socks with supervision. Pt demonstrated ability to complete functional transfers, including tub and toilet transfers. Pt toileted with supervision and washed hands with SBA. Pt demonstrated ability to transfer onto floor, which she needs to do to care for her cat's litter box. Pt got down onto her bottom on the floor with SBA then stood back up with Luis Carlos utilizing a grab bar. Pt left supine in bed with call bell within reach. Pt is currently functioning at/near baseline for ADLs. No acute OT needs identified at this time. Will d/c. This section established at most recent assessment PROBLEM LIST (Impairments causing functional limitations): 1. None INTERVENTIONS PLANNED: (Benefits and precautions of occupational therapy have been discussed with the patient.) 1. None TREATMENT PLAN: Frequency/Duration: Eval & d/c 
  
 
RECOMMENDED REHABILITATION/EQUIPMENT: (at time of discharge pending progress): Due to the probability of continued deficits (see above) this patient will not likely need continued skilled occupational therapy after discharge. Equipment:  
? None at this time OCCUPATIONAL PROFILE AND HISTORY:  
History of Present Injury/Illness (Reason for Referral): 
See H&P. Past Medical History/Comorbidities: Ms. Sharif Smith  has a past medical history of Alcohol abuse, Alcohol withdrawal ([address] Asthma, Other ill-defined conditions(799.89), Other ill-defined conditions(799.89), Psychiatric disorder, and Seizures ([address] Ms. Sharif Smith  has a past surgical history that includes hx gyn; hx orthopaedic; and hx heent. Social History/Living Environment:  
Home Environment: Apartment # Steps to Enter: 0(Elevators) One/Two Story Residence: One story Living Alone: Yes Support Systems: Friends \ neighbors Patient Expects to be Discharged to[de-identified] [id number] Current DME Used/Available at Home: Cane, straight Tub or Shower Type: Tub/Shower combination Prior Level of Function/Work/Activity: At baseline, pt lives alone and is independent with ADLs and Bob for functional mobility with a cane. Dominant Side:  
      RIGHT Personal Factors:   
      Sex:  female Age:  59 y.o. Number of Personal Factors/Comorbidities that affect the Plan of Care: Brief history (0):  LOW COMPLEXITY ASSESSMENT OF OCCUPATIONAL PERFORMANCE[de-identified]  
Activities of Daily Living:  
Basic ADLs (From Assessment) Complex ADLs (From Assessment) Feeding: Independent Oral Facial Hygiene/Grooming: Independent Bathing: Independent Upper Body Dressing: Independent Lower Body Dressing: Independent Toileting: Independent Grooming/Bathing/Dressing Activities of Daily Living Grooming Washing Hands: Stand-by assistance Cognitive Retraining Safety/Judgement: Awareness of environment; Fall prevention Toileting Toileting Assistance: Supervision/set up Bladder Hygiene: Supervision/set-up Bowel Hygiene: Supervision/set-up Clothing Management: Supervision/set-up Functional Transfers Bathroom Mobility: Stand-by assistance Toilet Transfer : Stand-by assistance Tub Transfer: Stand-by assistance Bed/Mat Mobility Supine to Sit: Stand-by assistance Sit to Supine: Stand-by assistance Sit to Stand: Stand-by assistance Scooting: Stand-by assistance Most Recent Physical Functioning:  
Gross Assessment: 
AROM: Generally decreased, functional(BUEs) Strength: Generally decreased, functional(BUEs) Coordination: Within functional limits(BUEs) Sensation: Intact(BUEs) Posture: 
Posture (WDL): Within defined limits Balance: 
Sitting: Intact Standing: Impaired Standing - Static: Good Standing - Dynamic : Fair(+) Bed Mobility: 
Supine to Sit: Stand-by assistance Sit to Supine: Stand-by assistance Scooting: Stand-by assistance Wheelchair Mobility: 
  
Transfers: 
Sit to Stand: Stand-by assistance Stand to Sit: Stand-by assistance Patient Vitals for the past 6 hrs: 
 BP BP Patient Position SpO2 Pulse 01/01/19 1206 124/79 At rest 95 % 88  
01/01/19 1538 115/69 At rest 95 % 79 Mental Status Neurologic State: Alert Orientation Level: Oriented X4 Cognition: Appropriate for age attention/concentration, Follows commands Perception: Appears intact Perseveration: No perseveration noted Safety/Judgement: Awareness of environment, Fall prevention Physical Skills Involved: 1. None Cognitive Skills Affected (resulting in the inability to perform in a timely and safe manner): 1. None Psychosocial Skills Affected: 1. Habits/Routines 2. Self-Awareness Number of elements that affect the Plan of Care: 1-3:  LOW COMPLEXITY CLINICAL DECISION MAKING:  
 Danielito Daily Activity Inpatient Short Form How much help from another person does the patient currently need. .. Total A Lot A Little None 1. Putting on and taking off regular lower body clothing? [] 1   [] 2   [] 3   [x] 4  
2. Bathing (including washing, rinsing, drying)? [] 1   [] 2   [] 3   [x] 4  
3. Toileting, which includes using toilet, bedpan or urinal?   [] 1   [] 2   [] 3   [x] 4  
4. Putting on and taking off regular upper body clothing? [] 1   [] 2   [] 3   [x] 4  
5. Taking care of personal grooming such as brushing teeth? [] 1   [] 2   [] 3   [x] 4  
6. Eating meals? [] 1   [] 2   [] 3   [x] 4  
© 2007, Trustees of [facility] [employer], under license to [employer]. All rights reserved Score:  Initial: 24  1/1/2019  Most Recent: X (Date: -- ) Interpretation of Tool:  Represents activities that are increasingly more difficult (i.e. Bed mobility, Transfers, Gait). Score 24 23 22-20 19-15 14-10 9-7 6 Modifier CH CI CJ CK CL CM CN   
 
? Self Care:  
  - CURRENT STATUS: CH - 0% impaired, limited or restricted  - GOAL STATUS: CH - 0% impaired, limited or restricted  - D/C STATUS:  CH - 0% impaired, limited or restricted Payor: [facility] [place] MEDICARE / Plan[place] [employer] [place] / Product Type: Managed Care Medicare /   
 
Medical Necessity:    
· NA. Reason for Services/Other Comments: 
· NA. Use of outcome tool(s) and clinical judgement create a POC that gives a: LOW COMPLEXITY  
 
 
 
TREATMENT:  
(In addition to Assessment/Re-Assessment sessions the following treatments were rendered) Pre-treatment Symptoms/Complaints:   
Pain: Initial:  
Pain Intensity 1: 7  Post Session:  same Assessment/Reassessment only, no treatment provided today Braces/Orthotics/Lines/Etc:  
· IV 
· O2 Device: Room air Treatment/Session Assessment:   
· Response to Treatment:  Assessment only · Interdisciplinary Collaboration:  
o Occupational Therapist 
o Registered Nurse · After treatment position/precautions:  
o Supine in bed 
o Bed/Chair-wheels locked 
o Bed in low position 
o Call light within reach · Compliance with Program/Exercises: NA. 
· Recommendations/Intent for next treatment session: NA Total Treatment Duration: OT Patient Time In/Time Out Time In: 0916 Time Out: 9670 Alison Taveras OTR/L

## 2019-01-01 NOTE — PROGRESS NOTES
Progress Note 2019 Admit Date: 2018 12:43 PM  
NAME: Ernesto Monahan :  1955 MRN:  [id number] Attending: Jennifer Carey NP 
PCP:  None Treatment Team: Attending Provider: Jennifer Carey NP; Consulting Provider: Shruthi Murillo MD; Care Manager: Mark Garcia LMSW Full Code SUBJECTIVE:  
Ms. Darius Hatch is a 60 yo female with PMH of alcohol abuse, asthma, seizure disorder, ADHD  presented after being found by her friends in her apartment. Her friends called EMS when they could not get in touch with her by phone. She was found with multiple bruises consistent with fall, xrays bilateral shoulder, knees, hips neg. She was confused upon arrival however did state she had been binge drinking the past week. She was found to have heme + stools in ED, reported on admission dark stools for several days, today reported to me dark stools for \"years\". UA consistent with UTI, started on rocephin. Urine cx NGTD. CT head without acute findings. CT abd showed likely gastritis, 10 mm left adrenal nodule incompletely characterized. GI consulted. Plans for EGD and colonoscopy Wednesday. Hgb stable. Has been hyopkalemic, replaced. Hypernatremic today. Started on banana bag on admission, alcohol withdrawal protocol. Denies abd pain, CP, SOB, bright red rectal bleeding. Past Medical History:  
Diagnosis Date  Alcohol abuse 10/31/2015  Alcohol withdrawal ([facility] [address] 10/31/2015  Asthma  Other ill-defined conditions(799.89)   
 lumbar and thoracic scoliosis  Other ill-defined conditions(799.89) irregular heart rate  Psychiatric disorder ADHD  Seizures ([facility] [address] Recent Results (from the past 24 hour(s)) POTASSIUM Collection Time: 18 11:25 AM  
Result Value Ref Range Potassium 2.7 (LL) 3.5 - 5.1 mmol/L  
POTASSIUM Collection Time: 18  5:28 PM  
Result Value Ref Range Potassium 2.7 (LL) 3.5 - 5.1 mmol/L MAGNESIUM  
 Collection Time: 12/31/18  5:28 PM  
Result Value Ref Range Magnesium 1.8 1.8 - 2.4 mg/dL METABOLIC PANEL, COMPREHENSIVE Collection Time: 01/01/19  5:59 AM  
Result Value Ref Range Sodium 146 (H) 136 - 145 mmol/L Potassium 3.0 (L) 3.5 - 5.1 mmol/L Chloride 110 (H) 98 - 107 mmol/L  
 CO2 29 21 - 32 mmol/L Anion gap 7 7 - 16 mmol/L Glucose 91 65 - 100 mg/dL BUN 7 (L) 8 - 23 MG/DL Creatinine 0.50 (L) 0.6 - 1.0 MG/DL  
 GFR est AA >60 >60 ml/min/1.73m2 GFR est non-AA >60 >60 ml/min/1.73m2 Calcium 8.1 (L) 8.3 - 10.4 MG/DL Bilirubin, total 0.8 0.2 - 1.1 MG/DL  
 ALT (SGPT) 16 12 - 65 U/L  
 AST (SGOT) 24 15 - 37 U/L Alk. phosphatase 84 50 - 136 U/L Protein, total 5.5 (L) 6.3 - 8.2 g/dL Albumin 2.9 (L) 3.2 - 4.6 g/dL Globulin 2.6 2.3 - 3.5 g/dL A-G Ratio 1.1 (L) 1.2 - 3.5    
CBC WITH AUTOMATED DIFF Collection Time: 01/01/19  5:59 AM  
Result Value Ref Range WBC 5.5 4.3 - 11.1 K/uL  
 RBC 3.75 (L) 4.05 - 5.2 M/uL  
 HGB 11.8 11.7 - 15.4 g/dL HCT 35.1 (L) 35.8 - 46.3 % MCV 93.6 79.6 - 97.8 FL  
 MCH 31.5 26.1 - 32.9 PG  
 MCHC 33.6 31.4 - 35.0 g/dL  
 RDW 12.2 11.9 - 14.6 % PLATELET 264 253 - 451 K/uL MPV 10.6 9.4 - 12.3 FL ABSOLUTE NRBC 0.00 0.0 - 0.2 K/uL  
 DF AUTOMATED NEUTROPHILS 44 43 - 78 % LYMPHOCYTES 44 13 - 44 % MONOCYTES 8 4.0 - 12.0 % EOSINOPHILS 4 0.5 - 7.8 % BASOPHILS 0 0.0 - 2.0 % IMMATURE GRANULOCYTES 0 0.0 - 5.0 %  
 ABS. NEUTROPHILS 2.4 1.7 - 8.2 K/UL  
 ABS. LYMPHOCYTES 2.4 0.5 - 4.6 K/UL  
 ABS. MONOCYTES 0.4 0.1 - 1.3 K/UL  
 ABS. EOSINOPHILS 0.2 0.0 - 0.8 K/UL  
 ABS. BASOPHILS 0.0 0.0 - 0.2 K/UL  
 ABS. IMM. GRANS. 0.0 0.0 - 0.5 K/UL PROTHROMBIN TIME + INR Collection Time: 01/01/19  5:59 AM  
Result Value Ref Range Prothrombin time 13.2 11.7 - 14.5 sec INR 1.0 MAGNESIUM Collection Time: 01/01/19  5:59 AM  
Result Value Ref Range Magnesium 2.0 1.8 - 2.4 mg/dL No Known Allergies Current Facility-Administered Medications Medication Dose Route Frequency Provider Last Rate Last Dose  potassium chloride 20 mEq in 100 ml IVPB  20 mEq IntraVENous Q2H Lennox CALDWELL NP      
 potassium chloride (K-DUR, KLOR-CON) SR tablet 40 mEq  40 mEq Oral DAILY Winferd Bloch, NP      
 dextrose 5% infusion  75 mL/hr IntraVENous CONTINUOUS Winferd Bloch, NP      
 pantoprazole (PROTONIX) 40 mg in sodium chloride 0.9% 10 mL injection  40 mg IntraVENous Q12H Jenniefr Cherry, NP   40 mg at 12/31/18 2107  polyethylene glycol (MIRALAX) powder 238 g  238 g Oral ONCE Zygmunt Carbo, NP      
 bisacodyl (DULCOLAX) tablet 10 mg  10 mg Oral NOW Zygmunt Carbo, NP      
 sodium chloride (NS) flush 5-10 mL  5-10 mL IntraVENous Vonnie Bake, NP   10 mL at 01/01/19 3704  sodium chloride (NS) flush 5-10 mL  5-10 mL IntraVENous PRN Jennifer Carey, NP      
 morphine injection 2 mg  2 mg IntraVENous Q4H PRN Jennifer Samsonot, NP   2 mg at 01/01/19 5189  naloxone (NARCAN) injection 0.4 mg  0.4 mg IntraVENous PRN Jennifer Samsonot, NP      
 ondansetron [facility]) injection 4 mg  4 mg IntraVENous Q4H PRN Jennifer Samsonot, NP      
 LORazepam (ATIVAN) 2 mg/mL injection 1 mg  1 mg IntraVENous Q4H PRN Jennifer Hoot, NP   1 mg at 01/01/19 0605  
 0.9% sodium chloride 1,000 mL with mvi, adult no. 4 with vit K 10 mL, thiamine 964 mg, folic acid 1 mg infusion   IntraVENous Q24H Jennifer Cherry,  mL/hr at 12/31/18 1719  cefTRIAXone (ROCEPHIN) 1 g in 0.9% sodium chloride (MBP/ADV) 50 mL  1 g IntraVENous Q24H Jennifer Hoot,  mL/hr at 12/31/18 1724 1 g at 12/31/18 1724 Review of Systems negative with exception of pertinent positives noted above PHYSICAL EXAM  
 
Visit Vitals /76 (BP 1 Location: Left arm, BP Patient Position: At rest) Pulse 100 Temp 98.5 °F (36.9 °C) Resp 17 Ht 5' 5\" (1.651 m) Wt 51.4 kg (113 lb 6.4 oz) SpO2 95% BMI 18.87 kg/m² Temp (24hrs), Av.4 °F (36.9 °C), Min:98.1 °F (36.7 °C), Max:99.1 °F (37.3 °C) Oxygen Therapy O2 Sat (%): 95 % (19 08) Pulse via Oximetry: 100 beats per minute (18 1631) O2 Device: Room air (19 0809) Intake/Output Summary (Last 24 hours) at 2019 0950 Last data filed at 2019 1893 Gross per 24 hour Intake  Output 1250 ml Net -1250 ml General:       No acute distress, confused at times   
Lungs:          CTA bilaterally. Resp even and nonlabored Heart:            S1S2 present without murmurs rubs gallops. RRR. No edema Abdomen:    Soft, nontender. BS present. No distention. Extremities: Moves ext spontaneously. No cyanosis. Neurologic:  Alert/oriented X4  Follows commands. Results summary of Diagnostic Studies/Procedures copied from within [facility] EMR:  
 
 
ASSESSMENT Active Hospital Problems Diagnosis Date Noted  Acute metabolic encephalopathy   Acute encephalopathy 2018 Plan: 
Acute metabolic/toxic encephalopathy Likely secondary to UTI, ?alcohol withdrawal 
CT head negative for acute findings Continue alcohol withdrawal protocols Continue rocephin for UTI Urine cx NGTD 
  
UTI Follow urine cx, NGTD Continue rocephin 
  
Alcohol abuse UDS neg Alcohol <3 Continue withdrawal protocol, ativan prn Banana bag X3 doses, started on admission 
  
Upper GI bleed GI consulted Plans for EGD/colonoscopy  Continue PPI Clear liquid diet IVF Follow H/H, transfuse prn Hgb stable 
  
Hypokalemia Replace Check Mag 
BMP in AM 
Start daily supplementation Hypernatremia Stop NS Start D5 BMP in AM 
  
  
Notes, labs, VS, diagnostic testing reviewed Time spent with pt 20 min DVT Prophylaxis: SCDs Plan of Care Discussed with: Supervising MD Dr. Kevin Hall, care team, pt Lebron Mc NP

## 2019-01-02 ENCOUNTER — ANESTHESIA (OUTPATIENT)
Dept: ENDOSCOPY | Age: 64
DRG: 071 | End: 2019-01-02
Payer: MEDICARE

## 2019-01-02 ENCOUNTER — ANESTHESIA EVENT (OUTPATIENT)
Dept: ENDOSCOPY | Age: 64
DRG: 071 | End: 2019-01-02
Payer: MEDICARE

## 2019-01-02 VITALS
HEART RATE: 88 BPM | OXYGEN SATURATION: 99 % | RESPIRATION RATE: 18 BRPM | BODY MASS INDEX: 18.84 KG/M2 | HEIGHT: 65 IN | TEMPERATURE: 98.2 F | WEIGHT: 113.1 LBS | SYSTOLIC BLOOD PRESSURE: 130 MMHG | DIASTOLIC BLOOD PRESSURE: 79 MMHG

## 2019-01-02 LAB
ALBUMIN SERPL-MCNC: 3.3 G/DL (ref 3.2–4.6)
ALBUMIN/GLOB SERPL: 1 {RATIO} (ref 1.2–3.5)
ALP SERPL-CCNC: 101 U/L (ref 50–136)
ALT SERPL-CCNC: 14 U/L (ref 12–65)
ANION GAP SERPL CALC-SCNC: 10 MMOL/L (ref 7–16)
AST SERPL-CCNC: 16 U/L (ref 15–37)
BACTERIA SPEC CULT: NORMAL
BASOPHILS # BLD: 0 K/UL (ref 0–0.2)
BASOPHILS NFR BLD: 0 % (ref 0–2)
BILIRUB SERPL-MCNC: 0.4 MG/DL (ref 0.2–1.1)
BUN SERPL-MCNC: 3 MG/DL (ref 8–23)
CALCIUM SERPL-MCNC: 8.3 MG/DL (ref 8.3–10.4)
CHLORIDE SERPL-SCNC: 108 MMOL/L (ref 98–107)
CO2 SERPL-SCNC: 25 MMOL/L (ref 21–32)
CREAT SERPL-MCNC: 0.64 MG/DL (ref 0.6–1)
DIFFERENTIAL METHOD BLD: NORMAL
EOSINOPHIL # BLD: 0.3 K/UL (ref 0–0.8)
EOSINOPHIL NFR BLD: 4 % (ref 0.5–7.8)
ERYTHROCYTE [DISTWIDTH] IN BLOOD BY AUTOMATED COUNT: 11.9 % (ref 11.9–14.6)
FOLATE SERPL-MCNC: 18.1 NG/ML (ref 3.1–17.5)
GLOBULIN SER CALC-MCNC: 3.2 G/DL (ref 2.3–3.5)
GLUCOSE SERPL-MCNC: 130 MG/DL (ref 65–100)
HCT VFR BLD AUTO: 38.9 % (ref 35.8–46.3)
HGB BLD-MCNC: 12.9 G/DL (ref 11.7–15.4)
IMM GRANULOCYTES # BLD: 0 K/UL (ref 0–0.5)
IMM GRANULOCYTES NFR BLD AUTO: 0 % (ref 0–5)
LYMPHOCYTES # BLD: 2.1 K/UL (ref 0.5–4.6)
LYMPHOCYTES NFR BLD: 31 % (ref 13–44)
MCH RBC QN AUTO: 31.1 PG (ref 26.1–32.9)
MCHC RBC AUTO-ENTMCNC: 33.2 G/DL (ref 31.4–35)
MCV RBC AUTO: 93.7 FL (ref 79.6–97.8)
MM INDURATION POC: 0 MM (ref 0–5)
MONOCYTES # BLD: 0.5 K/UL (ref 0.1–1.3)
MONOCYTES NFR BLD: 7 % (ref 4–12)
NEUTS SEG # BLD: 3.9 K/UL (ref 1.7–8.2)
NEUTS SEG NFR BLD: 58 % (ref 43–78)
NRBC # BLD: 0 K/UL (ref 0–0.2)
PLATELET # BLD AUTO: 259 K/UL (ref 150–450)
PMV BLD AUTO: 10.1 FL (ref 9.4–12.3)
POTASSIUM SERPL-SCNC: 3.1 MMOL/L (ref 3.5–5.1)
PPD POC: NORMAL NEGATIVE
PROT SERPL-MCNC: 6.5 G/DL (ref 6.3–8.2)
RBC # BLD AUTO: 4.15 M/UL (ref 4.05–5.2)
SERVICE CMNT-IMP: NORMAL
SODIUM SERPL-SCNC: 143 MMOL/L (ref 136–145)
TSH SERPL DL<=0.005 MIU/L-ACNC: 1.97 UIU/ML (ref 0.36–3.74)
VIT B12 SERPL-MCNC: 280 PG/ML (ref 193–986)
WBC # BLD AUTO: 6.7 K/UL (ref 4.3–11.1)

## 2019-01-02 PROCEDURE — 36415 COLL VENOUS BLD VENIPUNCTURE: CPT

## 2019-01-02 PROCEDURE — 0DBP8ZX EXCISION OF RECTUM, VIA NATURAL OR ARTIFICIAL OPENING ENDOSCOPIC, DIAGNOSTIC: ICD-10-PCS | Performed by: INTERNAL MEDICINE

## 2019-01-02 PROCEDURE — 74011000250 HC RX REV CODE- 250: Performed by: NURSE PRACTITIONER

## 2019-01-02 PROCEDURE — 74011250636 HC RX REV CODE- 250/636: Performed by: ANESTHESIOLOGY

## 2019-01-02 PROCEDURE — 74011250636 HC RX REV CODE- 250/636: Performed by: NURSE PRACTITIONER

## 2019-01-02 PROCEDURE — 74011250637 HC RX REV CODE- 250/637: Performed by: FAMILY MEDICINE

## 2019-01-02 PROCEDURE — 88312 SPECIAL STAINS GROUP 1: CPT

## 2019-01-02 PROCEDURE — 0DBK8ZZ EXCISION OF ASCENDING COLON, VIA NATURAL OR ARTIFICIAL OPENING ENDOSCOPIC: ICD-10-PCS | Performed by: INTERNAL MEDICINE

## 2019-01-02 PROCEDURE — 74011250637 HC RX REV CODE- 250/637: Performed by: ANESTHESIOLOGY

## 2019-01-02 PROCEDURE — 85025 COMPLETE CBC W/AUTO DIFF WBC: CPT

## 2019-01-02 PROCEDURE — 84443 ASSAY THYROID STIM HORMONE: CPT

## 2019-01-02 PROCEDURE — 77030013996 HC SNR POLYP ENDOSC OCOA -B: Performed by: INTERNAL MEDICINE

## 2019-01-02 PROCEDURE — 88305 TISSUE EXAM BY PATHOLOGIST: CPT

## 2019-01-02 PROCEDURE — 80053 COMPREHEN METABOLIC PANEL: CPT

## 2019-01-02 PROCEDURE — 77030009426 HC FCPS BIOP ENDOSC BSC -B: Performed by: INTERNAL MEDICINE

## 2019-01-02 PROCEDURE — 82607 VITAMIN B-12: CPT

## 2019-01-02 PROCEDURE — 76060000032 HC ANESTHESIA 0.5 TO 1 HR: Performed by: INTERNAL MEDICINE

## 2019-01-02 PROCEDURE — 74011250636 HC RX REV CODE- 250/636

## 2019-01-02 PROCEDURE — C9113 INJ PANTOPRAZOLE SODIUM, VIA: HCPCS | Performed by: NURSE PRACTITIONER

## 2019-01-02 PROCEDURE — 74011250637 HC RX REV CODE- 250/637: Performed by: NURSE PRACTITIONER

## 2019-01-02 PROCEDURE — 76040000026: Performed by: INTERNAL MEDICINE

## 2019-01-02 PROCEDURE — 0DB68ZX EXCISION OF STOMACH, VIA NATURAL OR ARTIFICIAL OPENING ENDOSCOPIC, DIAGNOSTIC: ICD-10-PCS | Performed by: INTERNAL MEDICINE

## 2019-01-02 PROCEDURE — 82746 ASSAY OF FOLIC ACID SERUM: CPT

## 2019-01-02 RX ORDER — POTASSIUM CHLORIDE 20 MEQ/1
40 TABLET, EXTENDED RELEASE ORAL DAILY
Qty: 7 TAB | Refills: 0 | Status: ON HOLD | OUTPATIENT
Start: 2019-01-03 | End: 2019-07-11

## 2019-01-02 RX ORDER — POTASSIUM CHLORIDE 14.9 MG/ML
20 INJECTION INTRAVENOUS
Status: DISCONTINUED | OUTPATIENT
Start: 2019-01-02 | End: 2019-01-02

## 2019-01-02 RX ORDER — PROPOFOL 10 MG/ML
INJECTION, EMULSION INTRAVENOUS AS NEEDED
Status: DISCONTINUED | OUTPATIENT
Start: 2019-01-02 | End: 2019-01-02 | Stop reason: HOSPADM

## 2019-01-02 RX ORDER — PROPOFOL 10 MG/ML
INJECTION, EMULSION INTRAVENOUS
Status: DISCONTINUED | OUTPATIENT
Start: 2019-01-02 | End: 2019-01-02 | Stop reason: HOSPADM

## 2019-01-02 RX ORDER — FAMOTIDINE 20 MG/1
20 TABLET, FILM COATED ORAL AS NEEDED
Status: DISCONTINUED | OUTPATIENT
Start: 2019-01-02 | End: 2019-01-02 | Stop reason: HOSPADM

## 2019-01-02 RX ORDER — LORAZEPAM 1 MG/1
1 TABLET ORAL
Status: DISCONTINUED | OUTPATIENT
Start: 2019-01-02 | End: 2019-01-02 | Stop reason: HOSPADM

## 2019-01-02 RX ORDER — SODIUM CHLORIDE, SODIUM LACTATE, POTASSIUM CHLORIDE, CALCIUM CHLORIDE 600; 310; 30; 20 MG/100ML; MG/100ML; MG/100ML; MG/100ML
100 INJECTION, SOLUTION INTRAVENOUS CONTINUOUS
Status: DISCONTINUED | OUTPATIENT
Start: 2019-01-02 | End: 2019-01-02

## 2019-01-02 RX ORDER — PANTOPRAZOLE SODIUM 40 MG/1
40 TABLET, DELAYED RELEASE ORAL
Status: DISCONTINUED | OUTPATIENT
Start: 2019-01-03 | End: 2019-01-02 | Stop reason: HOSPADM

## 2019-01-02 RX ORDER — GABAPENTIN 300 MG/1
300 CAPSULE ORAL
Status: COMPLETED | OUTPATIENT
Start: 2019-01-02 | End: 2019-01-02

## 2019-01-02 RX ADMIN — PROPOFOL 200 MCG/KG/MIN: 10 INJECTION, EMULSION INTRAVENOUS at 10:05

## 2019-01-02 RX ADMIN — POTASSIUM CHLORIDE 40 MEQ: 20 TABLET, EXTENDED RELEASE ORAL at 08:41

## 2019-01-02 RX ADMIN — Medication 10 ML: at 13:49

## 2019-01-02 RX ADMIN — SODIUM CHLORIDE, SODIUM LACTATE, POTASSIUM CHLORIDE, AND CALCIUM CHLORIDE: 600; 310; 30; 20 INJECTION, SOLUTION INTRAVENOUS at 09:47

## 2019-01-02 RX ADMIN — MORPHINE SULFATE 2 MG: 2 INJECTION, SOLUTION INTRAMUSCULAR; INTRAVENOUS at 11:54

## 2019-01-02 RX ADMIN — PROPOFOL 50 MG: 10 INJECTION, EMULSION INTRAVENOUS at 10:05

## 2019-01-02 RX ADMIN — MORPHINE SULFATE 2 MG: 2 INJECTION, SOLUTION INTRAMUSCULAR; INTRAVENOUS at 05:30

## 2019-01-02 RX ADMIN — GABAPENTIN 300 MG: 300 CAPSULE ORAL at 09:53

## 2019-01-02 RX ADMIN — LORAZEPAM 1 MG: 1 TABLET ORAL at 15:16

## 2019-01-02 RX ADMIN — LORAZEPAM 1 MG: 2 INJECTION, SOLUTION INTRAMUSCULAR; INTRAVENOUS at 11:54

## 2019-01-02 RX ADMIN — SODIUM CHLORIDE, SODIUM LACTATE, POTASSIUM CHLORIDE, AND CALCIUM CHLORIDE 100 ML/HR: 600; 310; 30; 20 INJECTION, SOLUTION INTRAVENOUS at 09:54

## 2019-01-02 RX ADMIN — LORAZEPAM 1 MG: 2 INJECTION, SOLUTION INTRAMUSCULAR; INTRAVENOUS at 05:29

## 2019-01-02 RX ADMIN — Medication 10 ML: at 05:31

## 2019-01-02 RX ADMIN — SODIUM CHLORIDE 40 MG: 9 INJECTION, SOLUTION INTRAMUSCULAR; INTRAVENOUS; SUBCUTANEOUS at 08:47

## 2019-01-02 NOTE — PROGRESS NOTES
Interdisciplinary Rounds completed 01/02/19. Nursing, Case Management, Physician and PT present. Plan of care reviewed and updated. Probable d/c tomorrow.

## 2019-01-02 NOTE — ANESTHESIA POSTPROCEDURE EVALUATION
Procedure(s): ESOPHAGOGASTRODUODENOSCOPY (EGD) COLONOSCOPY  BMI 20/ROOM 607 ESOPHAGEAL DILATION 
ESOPHAGOGASTRODUODENAL (EGD) BIOPSY ENDOSCOPIC POLYPECTOMY. Anesthesia Post Evaluation Multimodal analgesia: multimodal analgesia not used between 6 hours prior to anesthesia start to PACU discharge Patient location during evaluation: bedside Patient participation: complete - patient participated Level of consciousness: awake and alert Pain management: adequate Airway patency: patent Anesthetic complications: no 
Cardiovascular status: hemodynamically stable Respiratory status: spontaneous ventilation Hydration status: euvolemic Comments: Patient stable and may discharge at this time. Visit Vitals /87 Pulse 83 Temp 36.1 °C (97 °F) Resp 16 Ht 5' 5\" (1.651 m) Wt 51.3 kg (113 lb 1.6 oz) SpO2 99% BMI 18.82 kg/m²

## 2019-01-02 NOTE — PROGRESS NOTES
Per NP, patient is requesting a bedside commode and rolling walker.  Referral sent to [facility].

## 2019-01-02 NOTE — OP NOTES
Gastroenterology Procedure Note           Procedure:  Colonoscopy    Date of Procedure:  1/2/2019    Patient:  Petr Singleton     1955    Indication:  Rectal bleeding     Sedation:  MAC    Pre-Procedure Exam:    Mental status:  alert and oriented  Airway:  normal oropharyngeal airway and neck mobility  CV:  regular rate and rhythm   Respiratory:  clear to auscultation    Procedure:  A History and Physical has been performed, and patient medication allergies have been  reviewed. Risks of perforation, hemorrhage, adverse drug reaction, and aspiration were discussed. Informed consent was obtained for the procedure, including sedation. The patient was placed in the left lateral decubitus position. The heart rate, oxygen saturations, blood pressure, and response to care were monitored throughout the procedure. After performing a rectal exam, the colonoscope was passed through the anus and advanced under direct vision to the cecum, identified by appendiceal orifice and ileocecal valve. The quality of prep was adequate. A careful inspection was made as the colonoscope was withdrawn, including a retroflexed view of the rectum. The patient tolerated the procedure well. Findings:     ANUS:  Anal exam reveals no masses or hemorrhoids. RECTUM:  Internal hemorrhoids were seen in the rectum. One 6 mm sessile polyp was removed using a cold snare. SIGMOID COLON:  Multiple large and small-mouthed diverticula were seen. DESCENDING COLON:  Multiple large and small-mouthed diverticula were seen. SPLENIC FLEXURE:  The splenic flexure is normal.   TRANSVERSE COLON:  The mucosa is normal with good vascular pattern and without ulcers, diverticula, and polyps. HEPATIC FLEXURE:  The hepatic flexure is normal.   ASCENDING COLON:  One 10 mm flat polyp was removed using a hot snare.    CECUM:  The appendiceal orifice appears normal. The ileocecal valve appears normal.   TERMINAL ILEUM:  The terminal ileum was not entered. Specimen:  Yes    Estimated Blood Loss:  Minimal    Implant:  None           Impression:    Colon polyps. Diverticulosis. Internal hemorrhoids. This is most likely source of patient's chronic intermittent rectal bleeding. Plan:  1. Follow up pathology results. 2. Repeat colonoscopy for surveillance based on pathology results. 3. High fiber diet indefinitely. 4. We will sign off, but do not hesitate to contact us for any additional assistance. We will arrange for a GI follow-up office visit in 3-4 weeks. Patient will be contacted by our office.      Signed:  Anselmo Zelaya MD  1/2/2019  10:39 AM
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
## 2019-01-02 NOTE — PROGRESS NOTES
TRANSFER - IN REPORT: 
 
Verbal report received from Fara Craig RN(name) on Rudi Dias  being received from gi lab (unit) for routine progression of care Report consisted of patients Situation, Background, Assessment and  
Recommendations(SBAR). Information from the following report(s) SBAR was reviewed with the receiving nurse. Opportunity for questions and clarification was provided. Assessment completed upon patients arrival to unit and care assumed.
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
## 2019-01-02 NOTE — PROGRESS NOTES
PT note: 
 
Therapist observed pt walking with walker out in the hallway. She states she is going home AMA today. Nursing staff talking pt into staying and security called. Pt escorted back to room by security. Will hold off on PT at this time due to pt being anxious. Observed her walking 100 feet with RW and no assist.  Will check back on pt later as schedule allows.  
 
Aryan Da Silva, PTA

## 2019-01-02 NOTE — PROGRESS NOTES
Progress Note 2019 Admit Date: 2018 12:43 PM  
NAME: Catherine Pacheco :  1955 MRN:  [id number] Attending: Raghav Tripp NP 
PCP:  None Treatment Team: Attending Provider: Raghav Tripp NP; Care Manager: Joan Robison LMSW Full Code SUBJECTIVE:  
 
Ms. Adryan Sanderson is a 58 yo female with PMH of alcohol abuse, asthma, seizure disorder, ADHD  presented after being found by her friends in her apartment. Ples Postin friends called EMS when they could not get in touch with her by phone. She was found with multiple bruises consistent with fall, xrays bilateral shoulder, knees, hips neg.   She was confused upon arrival however did state she had been binge drinking the past week. Christian Ponce was found to have heme + stools in ED, reported on admission dark stools for several days, reported to me dark stools for \"years\".  UA consistent with UTI, started on rocephin.  Urine cx NGTD. CT head without acute findings. CT abd showed likely gastritis, 10 mm left adrenal nodule incompletely characterized. GI consulted. EGD today showed moderate gastritis, empiric esophageal dilation was performed. Colonoscopy today showed internal hemorrhoids, colon polyps, diverticulosis. Hgb stable. Has been hyopkalemic, replaced. Hypernatremic resolved. Started on banana bag on admission, alcohol withdrawal protocol. Today pt very agitated after procedure, wanting to leave AMA, she is  having paranoia, delusions. States her apartment roof is falling in and she needs to get home. Explained to pt she has had sedation and is unsafe to leave. She wishes to sign out AMA. Explained to pt she could not sign AMA paperwork with sedation medications in her system. She is found on exam with medical gloves on. She has taken out her own IV. She does not have any family to come and get her. She states she can call a cab to come get her.   She is writing letters to her ex- during our conversation, paranoid I may see what is on them. She then gets up and takes her bedside commode and walker and starts walking down the mackey in her hospital gown and with medical gloves on, very agitated trying to get on the elevator, does not appear to have sound judgement. Pt becomes aggressive, security called. Pt eventually is escorted back to her room. She picks up the phone call \"call her \" and starts pressing all 9s, is confused on how to use the phone. PT continues to be paranoid, at times rambles in conversation without making sense. Past Medical History:  
Diagnosis Date  Alcohol abuse 10/31/2015  Alcohol withdrawal ([facility] [address] 10/31/2015  Asthma  Other ill-defined conditions(799.89)   
 lumbar and thoracic scoliosis  Other ill-defined conditions(799.89) irregular heart rate  Psychiatric disorder ADHD  Seizures ([facility] [address] Recent Results (from the past 24 hour(s)) PLEASE READ & DOCUMENT PPD TEST IN 48 HRS Collection Time: 01/02/19  8:48 AM  
Result Value Ref Range PPD  Negative  
 mm Induration 0 mm CBC WITH AUTOMATED DIFF Collection Time: 01/02/19  1:35 PM  
Result Value Ref Range WBC 6.7 4.3 - 11.1 K/uL  
 RBC 4.15 4.05 - 5.2 M/uL  
 HGB 12.9 11.7 - 15.4 g/dL HCT 38.9 35.8 - 46.3 % MCV 93.7 79.6 - 97.8 FL  
 MCH 31.1 26.1 - 32.9 PG  
 MCHC 33.2 31.4 - 35.0 g/dL  
 RDW 11.9 11.9 - 14.6 % PLATELET 156 986 - 669 K/uL MPV 10.1 9.4 - 12.3 FL ABSOLUTE NRBC 0.00 0.0 - 0.2 K/uL  
 DF AUTOMATED NEUTROPHILS 58 43 - 78 % LYMPHOCYTES 31 13 - 44 % MONOCYTES 7 4.0 - 12.0 % EOSINOPHILS 4 0.5 - 7.8 % BASOPHILS 0 0.0 - 2.0 % IMMATURE GRANULOCYTES 0 0.0 - 5.0 %  
 ABS. NEUTROPHILS 3.9 1.7 - 8.2 K/UL  
 ABS. LYMPHOCYTES 2.1 0.5 - 4.6 K/UL  
 ABS. MONOCYTES 0.5 0.1 - 1.3 K/UL  
 ABS. EOSINOPHILS 0.3 0.0 - 0.8 K/UL  
 ABS. BASOPHILS 0.0 0.0 - 0.2 K/UL  
 ABS. IMM. GRANS. 0.0 0.0 - 0.5 K/UL METABOLIC PANEL, COMPREHENSIVE  
 Collection Time: 01/02/19  1:35 PM  
Result Value Ref Range Sodium 143 136 - 145 mmol/L Potassium 3.1 (L) 3.5 - 5.1 mmol/L Chloride 108 (H) 98 - 107 mmol/L  
 CO2 25 21 - 32 mmol/L Anion gap 10 7 - 16 mmol/L Glucose 130 (H) 65 - 100 mg/dL BUN 3 (L) 8 - 23 MG/DL Creatinine 0.64 0.6 - 1.0 MG/DL  
 GFR est AA >60 >60 ml/min/1.73m2 GFR est non-AA >60 >60 ml/min/1.73m2 Calcium 8.3 8.3 - 10.4 MG/DL Bilirubin, total 0.4 0.2 - 1.1 MG/DL  
 ALT (SGPT) 14 12 - 65 U/L  
 AST (SGOT) 16 15 - 37 U/L Alk. phosphatase 101 50 - 136 U/L Protein, total 6.5 6.3 - 8.2 g/dL Albumin 3.3 3.2 - 4.6 g/dL Globulin 3.2 2.3 - 3.5 g/dL A-G Ratio 1.0 (L) 1.2 - 3.5 No Known Allergies Current Facility-Administered Medications Medication Dose Route Frequency Provider Last Rate Last Dose  famotidine (PF) (PEPCID) 20 mg in sodium chloride 0.9% 10 mL injection  20 mg IntraVENous ONCE PRN Milly Barrera MD      
 famotidine (PEPCID) tablet 20 mg  20 mg Oral PRN Milly Barrera MD      
 [START ON 1/3/2019] pantoprazole (PROTONIX) tablet 40 mg  40 mg Oral ACB Moira Escobar MD      
 LORazepam (ATIVAN) tablet 1 mg  1 mg Oral Q6H PRN Kim Bruce NP      
 potassium chloride (K-DUR, KLOR-CON) SR tablet 40 mEq  40 mEq Oral DAILY Lizzie CALDWELL NP   40 mEq at 01/02/19 0841  
 nicotine (NICODERM CQ) 21 mg/24 hr patch 1 Patch  1 Patch TransDERmal DAILY Roosvelt Damon, DO   1 Patch at 01/02/19 0900  
 sodium chloride (NS) flush 5-10 mL  5-10 mL IntraVENous Q8H Chen Morales NP   10 mL at 01/02/19 1349  
 sodium chloride (NS) flush 5-10 mL  5-10 mL IntraVENous PRN Chen Morales, NP      
 naloxone [facility]) injection 0.4 mg  0.4 mg IntraVENous PRN Chen Morales, NP      
 ondansetron [facility]) injection 4 mg  4 mg IntraVENous Q4H PRN Chen Morales, NP Review of Systems negative with exception of pertinent positives noted above PHYSICAL EXAM  
 
Visit Vitals /79 (BP 1 Location: Right arm, BP Patient Position: At rest;Head of bed elevated (Comment degrees)) Pulse 88 Temp 98.2 °F (36.8 °C) Resp 18 Ht 5' 5\" (1.651 m) Wt 51.3 kg (113 lb 1.6 oz) SpO2 99% BMI 18.82 kg/m² Temp (24hrs), Av.4 °F (36.9 °C), Min:97 °F (36.1 °C), Max:99 °F (37.2 °C) Oxygen Therapy O2 Sat (%): 99 % (19 1218) Pulse via Oximetry: 83 beats per minute (19 1122) O2 Device: Room air (19 112) O2 Flow Rate (L/min): 2 l/min (19 1043) Intake/Output Summary (Last 24 hours) at 2019 1432 Last data filed at 2019 1037 Gross per 24 hour Intake 972 ml Output 1100 ml Net -128 ml General:      Agitated, paranoid   
Lungs:          refuses for me to assess,  resp even and nonlabored DLDAK:            RRSJDGK for me to assess Abdomen:    refuses for me to assess Extremities: Moves ext spontaneously.  No cyanosis. Neurologic:  Alert/oriented to person, place, not time. Psych: Agitated, paranoid Results summary of Diagnostic Studies/Procedures copied from within [facility] EMR: 
 
ASSESSMENT Active Hospital Problems Diagnosis Date Noted  Acute metabolic encephalopathy   Acute encephalopathy 2018 Plan: 
Acute metabolic/toxic encephalopathy CT head negative for acute findings Continue alcohol withdrawal protocols Urine cx NGTD Stop Rocephin, received 3 days 
  
UTI Follow urine cx, NGTD Stop rocephin, received 3 days 
  
Alcohol abuse UDS neg Alcohol <3 Continue withdrawal protocol, ativan prn Banana bag X3 doses, started on admission, completed 
  
Upper GI bleed GI consulted EGD and colonoscopy 1/2 with findings of gastritis on EGD, polyps on colonoscopy Continue PPI Hgb stable 
  
Hypokalemia Replace BMP in AM 
Continue daily supplementation 
  
Hypernatremia Resolved BMP in AM 
  
Agitation/aggression/paranoia I have consulted with Dr. Eneida Handley and we do not feel this patient is safe to leave on her own free will. Consult [facility] One on one sitter Check B12, folate, TSH 
 
 
   
Notes, labs, VS, diagnostic testing reviewed Time spent with pt 20 min 
  
  
DVT Prophylaxis: SCDs Plan of Care Discussed with: Supervising MD Dr. Eneida Handley, care team, pt 
  
  
João Hayes, TANYA

## 2019-01-02 NOTE — PROGRESS NOTES
TRANSFER - IN REPORT: 
 
Verbal report received from unit nurse on Graciela Mems  being received from 607(unit) for ordered procedure Report consisted of patients Situation, Background, Assessment and  
Recommendations(SBAR). Information from the following report(s) SBAR was reviewed with the receiving nurse. Opportunity for questions and clarification was provided. Assessment completed upon patients arrival to unit and care assumed.

## 2019-01-02 NOTE — ROUTINE PROCESS
TRANSFER - OUT REPORT: 
 
Verbal report given to Alicia Burris RN(name) on Joeseph Libman  being transferred to [facility](unit) for routine progression of care Report consisted of patients Situation, Background, Assessment and  
Recommendations(SBAR). Information from the following report(s) Procedure Summary was reviewed with the receiving nurse. Lines:  
Peripheral IV 01/02/19 Right Hand (Active) Site Assessment Clean, dry, & intact 1/2/2019  9:28 AM  
Phlebitis Assessment 0 1/2/2019  9:28 AM  
Infiltration Assessment 0 1/2/2019  9:28 AM  
Dressing Status Clean, dry, & intact 1/2/2019  9:28 AM  
Dressing Type Transparent;Tape 1/2/2019  9:28 AM  
Hub Color/Line Status Blue; Infusing 1/2/2019  9:28 AM  
  
 
Opportunity for questions and clarification was provided. Patient transported with: 
 Registered Nurse

## 2019-01-02 NOTE — OP NOTES
Gastroenterology Procedure Note           Procedure:  Esophagogastroduodenoscopy    Date of Procedure:  1/2/2019    Patient:  Cassandra Tamayo     1955    Indication:  Epigastric pain, dysphagia     Sedation:  MAC    Pre-Procedure Physical Exam:    Mental status:  alert and oriented  Airway:  normal oropharyngeal airway and neck mobility  CV:  regular rate and rhythm  Respiratory:  clear to auscultation    Procedure:  A History and Physical has been performed, and patient medication allergies have been  reviewed. Risks of perforation, hemorrhage, adverse drug reaction, and aspiration were discussed. Informed consent was obtained for the procedure, including sedation. The patient was placed in the left lateral decubitus position. The heart rate, oxygen saturations, blood pressure, and response to care were monitored throughout the procedure. The gastroscope was passed through the mouth and advanced under direct vision to the second portion of the duodenum. A careful inspection was made as the gastroscope was withdrawn, including a retroflexed view of the proximal stomach. The patient tolerated the procedure well. Findings:     OROPHARYNX: Cords and pyriform recesses appear normal.   ESOPHAGUS: No overt stenosis was seen in the esophagus. Empiric dilation was performed using a 54 Fr Miller dilator. STOMACH: Moderate gastritis was seen in the body and antrum. This was characterized by erythematous mucosa and a few dispersed erosions. Biopsies of the body and antrum were obtained for H pylori. DUODENUM: The bulb and second portions are normal.    Specimen:  Yes    Estimated Blood Loss:  Minimal    Implant:  None           Impression:    Empiric esophageal dilation. Moderate gastritis. Plan:  1. Follow up pathology results. Treat H pylori if positive. 2. Avoid NSAIDs such as Ibuprofen and Naproxen. 3. Protonix 40 mg once daily.     Signed:  Domi Farris MD  1/2/2019  10:13 AM

## 2019-01-02 NOTE — ANESTHESIA PREPROCEDURE EVALUATION
Anesthetic History Review of Systems / Medical History Patient summary reviewed and pertinent labs reviewed Pulmonary Smoker Neuro/Psych  
 
seizures (last one a year and half ago, on neurotin.): well controlled Psychiatric history Cardiovascular Exercise tolerance: >4 METS 
  
GI/Hepatic/Renal 
  
GERD Comments: Gi bleed. Endo/Other Within defined limits Other Findings Physical Exam 
 
Airway Mallampati: II 
TM Distance: 4 - 6 cm Neck ROM: normal range of motion Mouth opening: Normal 
 
 Cardiovascular Regular rate and rhythm,  S1 and S2 normal,  no murmur, click, rub, or gallop Rhythm: regular Rate: normal 
 
 
 
 Dental 
 
Dentition: Caps/crowns Pulmonary Breath sounds clear to auscultation Abdominal 
 
 
 
 Other Findings Anesthetic Plan ASA: 3 Anesthesia type: total IV anesthesia Induction: Intravenous Anesthetic plan and risks discussed with: Patient

## 2019-01-02 NOTE — PROGRESS NOTES
Pt said she has an emergency at home, that \"her roof caved in\" and she needs to get home ASAP. DR Solo Resendez NP said it was okay for the pt to leave AMA. Paper signed by pt and placed in chart, patient transported self out.

## 2019-01-02 NOTE — PROGRESS NOTES
TRANSFER - OUT REPORT: 
 
Verbal report given to Chel Reza RN(name) on Cyrus  being transferred to GI Lab(unit) for routine progression of care Report consisted of patients Situation, Background, Assessment and  
Recommendations(SBAR). Information from the following report(s) SBAR was reviewed with the receiving nurse. Lines:  
Peripheral IV 12/30/18 Anterior; Left Antecubital (Active) Site Assessment Clean, dry, & intact 1/2/2019  7:00 AM  
Phlebitis Assessment 0 1/2/2019  7:00 AM  
Infiltration Assessment 0 1/2/2019  7:00 AM  
Dressing Status Clean, dry, & intact 1/2/2019  7:00 AM  
Dressing Type Transparent;Tape 1/2/2019  7:00 AM  
Hub Color/Line Status Infusing 1/2/2019  7:00 AM  
  
 
Opportunity for questions and clarification was provided. Patient transported with: 
 Neul

## 2019-01-02 NOTE — PROGRESS NOTES
END OF SHIFT NOTE: 
 
INTAKE/OUTPUT 
12/31 0701 - 01/01 0700 In: -  
Out: 450 [Urine:450] Voiding: YES Catheter: NO 
Color: [employer] Drain: DIET 
clear Flatus: Patient does have flatus present. Stool:  0 occurrences. Characteristics: 
Stool Assessment Stool Appearance: Bloody, Loose(per pt) Ambulating Yes Emesis: 0 occurrences. Characteristics: VITAL SIGNS Patient Vitals for the past 12 hrs: 
 Temp Pulse Resp BP SpO2  
01/01/19 1920 98.8 °F (37.1 °C) 92 18 123/81 96 % 01/01/19 1538 98.7 °F (37.1 °C) 79 17 115/69 95 % 01/01/19 1206 98.4 °F (36.9 °C) 88 17 124/79 95 % 01/01/19 0809 98.5 °F (36.9 °C) 100 17 114/76 95 % Pain Assessment Pain Intensity 1: 8 (01/01/19 1801) Pain Location 1: Abdomen Pain Intervention(s) 1: Medication (see MAR) Patient Stated Pain Goal: 5 Ambulated to BR and back. Appears steady. Became tearful and reported feeling overwhelmed with social situation regarding financial stressors including feeling excluded from her former spouses family. Lives in subsidized apartment where 'they gossip too much' and this is a stressor. She does smoke and was not open to a quit date at this time. nicoderm patch in LUE. She is also concerned regarding broken front teeth. She asked for consult to a dentist or oral surgeon in hopes of repairing them. She said that they majority are artificial but not implants and 'not screwed in' and is concerned that she will not be able to afford repair if not done as an IP. MD was updated. She requested a patient relations consult. She is not unpleasant and attempts to cope and be uplifting. Uncertain of any solid social support system.  
 
 
 
Hiral Garcia RN

## 2019-01-02 NOTE — INTERVAL H&P NOTE
H&P Update: 
Effie Stevenson was seen and examined. History and physical has been reviewed. Significant clinical changes have occurred as noted:  none Signed By: Anselmo Zelaya MD   
 January 2, 2019 7:59 AM

## 2019-01-02 NOTE — PROGRESS NOTES
Hourly rounding completed. Patient rested throughout the night. Consents for EGD and Colonoscopy are signed and in patient's chart. Patient has had 2 CHG baths. All needs mets at this time.

## 2019-03-17 ENCOUNTER — APPOINTMENT (OUTPATIENT)
Dept: GENERAL RADIOLOGY | Age: 64
End: 2019-03-17
Attending: FAMILY MEDICINE
Payer: MEDICARE

## 2019-03-17 ENCOUNTER — APPOINTMENT (OUTPATIENT)
Dept: CT IMAGING | Age: 64
End: 2019-03-17
Attending: EMERGENCY MEDICINE
Payer: MEDICARE

## 2019-03-17 ENCOUNTER — HOSPITAL ENCOUNTER (OUTPATIENT)
Age: 64
Setting detail: OBSERVATION
Discharge: HOME | End: 2019-03-18
Attending: EMERGENCY MEDICINE | Admitting: FAMILY MEDICINE
Payer: MEDICARE

## 2019-03-17 DIAGNOSIS — N17.9 AKI (ACUTE KIDNEY INJURY) (HCC): Primary | ICD-10-CM

## 2019-03-17 DIAGNOSIS — K92.0 GASTROINTESTINAL HEMORRHAGE WITH HEMATEMESIS: ICD-10-CM

## 2019-03-17 PROBLEM — S22.49XA FRACTURE OF MULTIPLE RIBS: Status: RESOLVED | Noted: 2017-09-09 | Resolved: 2019-03-17

## 2019-03-17 PROBLEM — S22.49XA RIB FRACTURES: Status: RESOLVED | Noted: 2017-09-09 | Resolved: 2019-03-17

## 2019-03-17 PROBLEM — G93.41 ACUTE METABOLIC ENCEPHALOPATHY: Status: RESOLVED | Noted: 2018-12-31 | Resolved: 2019-03-17

## 2019-03-17 PROBLEM — E86.0 DEHYDRATION: Status: ACTIVE | Noted: 2019-03-17

## 2019-03-17 PROBLEM — G93.40 ACUTE ENCEPHALOPATHY: Status: RESOLVED | Noted: 2018-12-30 | Resolved: 2019-03-17

## 2019-03-17 PROBLEM — S22.49XA FRACTURE OF RIBS, MULTIPLE, CLOSED: Status: RESOLVED | Noted: 2017-09-09 | Resolved: 2019-03-17

## 2019-03-17 PROBLEM — J93.9 PNEUMOTHORAX: Status: RESOLVED | Noted: 2017-09-09 | Resolved: 2019-03-17

## 2019-03-17 PROBLEM — R11.2 NAUSEA & VOMITING: Status: ACTIVE | Noted: 2019-03-17

## 2019-03-17 LAB
ALBUMIN SERPL-MCNC: 3.8 G/DL (ref 3.2–4.6)
ALBUMIN/GLOB SERPL: 1.1 {RATIO} (ref 1.2–3.5)
ALP SERPL-CCNC: 133 U/L (ref 50–136)
ALT SERPL-CCNC: 10 U/L (ref 12–65)
ANION GAP SERPL CALC-SCNC: 10 MMOL/L (ref 7–16)
ANION GAP SERPL CALC-SCNC: 9 MMOL/L (ref 7–16)
AST SERPL-CCNC: 20 U/L (ref 15–37)
ATRIAL RATE: 99 BPM
BACTERIA URNS QL MICRO: 0 /HPF
BASOPHILS # BLD: 0 K/UL (ref 0–0.2)
BASOPHILS # BLD: 0 K/UL (ref 0–0.2)
BASOPHILS NFR BLD: 0 % (ref 0–2)
BASOPHILS NFR BLD: 0 % (ref 0–2)
BILIRUB SERPL-MCNC: 0.4 MG/DL (ref 0.2–1.1)
BUN SERPL-MCNC: 10 MG/DL (ref 8–23)
BUN SERPL-MCNC: 7 MG/DL (ref 8–23)
CALCIUM SERPL-MCNC: 8.2 MG/DL (ref 8.3–10.4)
CALCIUM SERPL-MCNC: 8.4 MG/DL (ref 8.3–10.4)
CALCULATED P AXIS, ECG09: 74 DEGREES
CALCULATED R AXIS, ECG10: 84 DEGREES
CALCULATED T AXIS, ECG11: 66 DEGREES
CASTS URNS QL MICRO: 0 /LPF
CHLORIDE SERPL-SCNC: 102 MMOL/L (ref 98–107)
CHLORIDE SERPL-SCNC: 96 MMOL/L (ref 98–107)
CK SERPL-CCNC: 90 U/L (ref 21–215)
CO2 SERPL-SCNC: 24 MMOL/L (ref 21–32)
CO2 SERPL-SCNC: 25 MMOL/L (ref 21–32)
CREAT SERPL-MCNC: 1.41 MG/DL (ref 0.6–1)
CREAT SERPL-MCNC: 1.56 MG/DL (ref 0.6–1)
CRYSTALS URNS QL MICRO: 0 /LPF
DIAGNOSIS, 93000: NORMAL
DIFFERENTIAL METHOD BLD: ABNORMAL
DIFFERENTIAL METHOD BLD: ABNORMAL
EOSINOPHIL # BLD: 0 K/UL (ref 0–0.8)
EOSINOPHIL # BLD: 0 K/UL (ref 0–0.8)
EOSINOPHIL NFR BLD: 0 % (ref 0.5–7.8)
EOSINOPHIL NFR BLD: 0 % (ref 0.5–7.8)
EPI CELLS #/AREA URNS HPF: NORMAL /HPF
ERYTHROCYTE [DISTWIDTH] IN BLOOD BY AUTOMATED COUNT: 13.3 % (ref 11.9–14.6)
ERYTHROCYTE [DISTWIDTH] IN BLOOD BY AUTOMATED COUNT: 13.3 % (ref 11.9–14.6)
ETHANOL SERPL-MCNC: <3 MG/DL
GLOBULIN SER CALC-MCNC: 3.6 G/DL (ref 2.3–3.5)
GLUCOSE SERPL-MCNC: 110 MG/DL (ref 65–100)
GLUCOSE SERPL-MCNC: 146 MG/DL (ref 65–100)
HCT VFR BLD AUTO: 38.6 % (ref 35.8–46.3)
HCT VFR BLD AUTO: 41.3 % (ref 35.8–46.3)
HCT VFR BLD AUTO: 42.2 % (ref 35.8–46.3)
HGB BLD-MCNC: 13.1 G/DL (ref 11.7–15.4)
HGB BLD-MCNC: 14.3 G/DL (ref 11.7–15.4)
HGB BLD-MCNC: 14.6 G/DL (ref 11.7–15.4)
IMM GRANULOCYTES # BLD AUTO: 0 K/UL (ref 0–0.5)
IMM GRANULOCYTES # BLD AUTO: 0.1 K/UL (ref 0–0.5)
IMM GRANULOCYTES NFR BLD AUTO: 0 % (ref 0–5)
IMM GRANULOCYTES NFR BLD AUTO: 0 % (ref 0–5)
LIPASE SERPL-CCNC: 139 U/L (ref 73–393)
LYMPHOCYTES # BLD: 0.8 K/UL (ref 0.5–4.6)
LYMPHOCYTES # BLD: 1.1 K/UL (ref 0.5–4.6)
LYMPHOCYTES NFR BLD: 6 % (ref 13–44)
LYMPHOCYTES NFR BLD: 9 % (ref 13–44)
MCH RBC QN AUTO: 30 PG (ref 26.1–32.9)
MCH RBC QN AUTO: 30.4 PG (ref 26.1–32.9)
MCHC RBC AUTO-ENTMCNC: 34.6 G/DL (ref 31.4–35)
MCHC RBC AUTO-ENTMCNC: 34.6 G/DL (ref 31.4–35)
MCV RBC AUTO: 86.8 FL (ref 79.6–97.8)
MCV RBC AUTO: 87.7 FL (ref 79.6–97.8)
MONOCYTES # BLD: 0.7 K/UL (ref 0.1–1.3)
MONOCYTES # BLD: 1.2 K/UL (ref 0.1–1.3)
MONOCYTES NFR BLD: 10 % (ref 4–12)
MONOCYTES NFR BLD: 5 % (ref 4–12)
MUCOUS THREADS URNS QL MICRO: 0 /LPF
NEUTS SEG # BLD: 10.6 K/UL (ref 1.7–8.2)
NEUTS SEG # BLD: 11.2 K/UL (ref 1.7–8.2)
NEUTS SEG NFR BLD: 81 % (ref 43–78)
NEUTS SEG NFR BLD: 88 % (ref 43–78)
NRBC # BLD: 0 K/UL (ref 0–0.2)
NRBC # BLD: 0 K/UL (ref 0–0.2)
OTHER OBSERVATIONS,UCOM: NORMAL
P-R INTERVAL, ECG05: 140 MS
PLATELET # BLD AUTO: 445 K/UL (ref 150–450)
PLATELET # BLD AUTO: 501 K/UL (ref 150–450)
PMV BLD AUTO: 9.1 FL (ref 9.4–12.3)
PMV BLD AUTO: 9.4 FL (ref 9.4–12.3)
POTASSIUM SERPL-SCNC: 3 MMOL/L (ref 3.5–5.1)
POTASSIUM SERPL-SCNC: 3.5 MMOL/L (ref 3.5–5.1)
PROT SERPL-MCNC: 7.4 G/DL (ref 6.3–8.2)
Q-T INTERVAL, ECG07: 354 MS
QRS DURATION, ECG06: 80 MS
QTC CALCULATION (BEZET), ECG08: 454 MS
RBC # BLD AUTO: 4.71 M/UL (ref 4.05–5.2)
RBC # BLD AUTO: 4.86 M/UL (ref 4.05–5.2)
RBC #/AREA URNS HPF: NORMAL /HPF
SODIUM SERPL-SCNC: 130 MMOL/L (ref 136–145)
SODIUM SERPL-SCNC: 136 MMOL/L (ref 136–145)
VENTRICULAR RATE, ECG03: 99 BPM
WBC # BLD AUTO: 12.7 K/UL (ref 4.3–11.1)
WBC # BLD AUTO: 13 K/UL (ref 4.3–11.1)
WBC URNS QL MICRO: NORMAL /HPF

## 2019-03-17 PROCEDURE — 80307 DRUG TEST PRSMV CHEM ANLYZR: CPT

## 2019-03-17 PROCEDURE — 96376 TX/PRO/DX INJ SAME DRUG ADON: CPT | Performed by: EMERGENCY MEDICINE

## 2019-03-17 PROCEDURE — 74011250637 HC RX REV CODE- 250/637: Performed by: FAMILY MEDICINE

## 2019-03-17 PROCEDURE — 74011000250 HC RX REV CODE- 250: Performed by: EMERGENCY MEDICINE

## 2019-03-17 PROCEDURE — 96361 HYDRATE IV INFUSION ADD-ON: CPT | Performed by: EMERGENCY MEDICINE

## 2019-03-17 PROCEDURE — 71046 X-RAY EXAM CHEST 2 VIEWS: CPT

## 2019-03-17 PROCEDURE — 96361 HYDRATE IV INFUSION ADD-ON: CPT

## 2019-03-17 PROCEDURE — 85018 HEMOGLOBIN: CPT

## 2019-03-17 PROCEDURE — 81015 MICROSCOPIC EXAM OF URINE: CPT

## 2019-03-17 PROCEDURE — 70450 CT HEAD/BRAIN W/O DYE: CPT

## 2019-03-17 PROCEDURE — 96375 TX/PRO/DX INJ NEW DRUG ADDON: CPT | Performed by: EMERGENCY MEDICINE

## 2019-03-17 PROCEDURE — C9113 INJ PANTOPRAZOLE SODIUM, VIA: HCPCS | Performed by: EMERGENCY MEDICINE

## 2019-03-17 PROCEDURE — 93005 ELECTROCARDIOGRAM TRACING: CPT | Performed by: FAMILY MEDICINE

## 2019-03-17 PROCEDURE — 96374 THER/PROPH/DIAG INJ IV PUSH: CPT | Performed by: EMERGENCY MEDICINE

## 2019-03-17 PROCEDURE — 99285 EMERGENCY DEPT VISIT HI MDM: CPT | Performed by: EMERGENCY MEDICINE

## 2019-03-17 PROCEDURE — 83690 ASSAY OF LIPASE: CPT

## 2019-03-17 PROCEDURE — 36415 COLL VENOUS BLD VENIPUNCTURE: CPT

## 2019-03-17 PROCEDURE — 74011000250 HC RX REV CODE- 250: Performed by: FAMILY MEDICINE

## 2019-03-17 PROCEDURE — 74011250637 HC RX REV CODE- 250/637: Performed by: EMERGENCY MEDICINE

## 2019-03-17 PROCEDURE — 99218 HC RM OBSERVATION: CPT

## 2019-03-17 PROCEDURE — 97165 OT EVAL LOW COMPLEX 30 MIN: CPT

## 2019-03-17 PROCEDURE — 74011250637 HC RX REV CODE- 250/637: Performed by: INTERNAL MEDICINE

## 2019-03-17 PROCEDURE — 96365 THER/PROPH/DIAG IV INF INIT: CPT | Performed by: EMERGENCY MEDICINE

## 2019-03-17 PROCEDURE — 80053 COMPREHEN METABOLIC PANEL: CPT

## 2019-03-17 PROCEDURE — 82550 ASSAY OF CK (CPK): CPT

## 2019-03-17 PROCEDURE — 74011250636 HC RX REV CODE- 250/636: Performed by: EMERGENCY MEDICINE

## 2019-03-17 PROCEDURE — 96366 THER/PROPH/DIAG IV INF ADDON: CPT

## 2019-03-17 PROCEDURE — 85025 COMPLETE CBC W/AUTO DIFF WBC: CPT

## 2019-03-17 PROCEDURE — 87040 BLOOD CULTURE FOR BACTERIA: CPT

## 2019-03-17 PROCEDURE — 74011250636 HC RX REV CODE- 250/636: Performed by: FAMILY MEDICINE

## 2019-03-17 RX ORDER — OXYCODONE HYDROCHLORIDE 5 MG/1
5 TABLET ORAL
Status: DISCONTINUED | OUTPATIENT
Start: 2019-03-17 | End: 2019-03-18 | Stop reason: HOSPADM

## 2019-03-17 RX ORDER — ONDANSETRON 2 MG/ML
4 INJECTION INTRAMUSCULAR; INTRAVENOUS
Status: COMPLETED | OUTPATIENT
Start: 2019-03-17 | End: 2019-03-17

## 2019-03-17 RX ORDER — LIDOCAINE HYDROCHLORIDE 20 MG/ML
15 SOLUTION OROPHARYNGEAL
Status: COMPLETED | OUTPATIENT
Start: 2019-03-17 | End: 2019-03-17

## 2019-03-17 RX ORDER — ACETAMINOPHEN 325 MG/1
650 TABLET ORAL
Status: DISCONTINUED | OUTPATIENT
Start: 2019-03-17 | End: 2019-03-18 | Stop reason: HOSPADM

## 2019-03-17 RX ORDER — LORAZEPAM 1 MG/1
1 TABLET ORAL
Status: DISCONTINUED | OUTPATIENT
Start: 2019-03-17 | End: 2019-03-17

## 2019-03-17 RX ORDER — TRAMADOL HYDROCHLORIDE 50 MG/1
50 TABLET ORAL
Status: DISCONTINUED | OUTPATIENT
Start: 2019-03-17 | End: 2019-03-18 | Stop reason: HOSPADM

## 2019-03-17 RX ORDER — LORAZEPAM 1 MG/1
2 TABLET ORAL
Status: DISCONTINUED | OUTPATIENT
Start: 2019-03-17 | End: 2019-03-18

## 2019-03-17 RX ORDER — ONDANSETRON 2 MG/ML
4 INJECTION INTRAMUSCULAR; INTRAVENOUS
Status: DISCONTINUED | OUTPATIENT
Start: 2019-03-17 | End: 2019-03-18 | Stop reason: HOSPADM

## 2019-03-17 RX ORDER — LORAZEPAM 1 MG/1
2 TABLET ORAL
Status: DISCONTINUED | OUTPATIENT
Start: 2019-03-17 | End: 2019-03-17

## 2019-03-17 RX ORDER — PANTOPRAZOLE SODIUM 40 MG/1
40 TABLET, DELAYED RELEASE ORAL
Status: DISCONTINUED | OUTPATIENT
Start: 2019-03-17 | End: 2019-03-17

## 2019-03-17 RX ORDER — SODIUM CHLORIDE 9 MG/ML
150 INJECTION, SOLUTION INTRAVENOUS CONTINUOUS
Status: DISPENSED | OUTPATIENT
Start: 2019-03-17 | End: 2019-03-18

## 2019-03-17 RX ORDER — SODIUM CHLORIDE 0.9 % (FLUSH) 0.9 %
5-40 SYRINGE (ML) INJECTION EVERY 8 HOURS
Status: DISCONTINUED | OUTPATIENT
Start: 2019-03-17 | End: 2019-03-17 | Stop reason: SDUPTHER

## 2019-03-17 RX ORDER — ADHESIVE BANDAGE
30 BANDAGE TOPICAL DAILY PRN
Status: DISCONTINUED | OUTPATIENT
Start: 2019-03-17 | End: 2019-03-18 | Stop reason: HOSPADM

## 2019-03-17 RX ORDER — POTASSIUM CHLORIDE 20 MEQ/1
40 TABLET, EXTENDED RELEASE ORAL DAILY
Status: DISCONTINUED | OUTPATIENT
Start: 2019-03-17 | End: 2019-03-18 | Stop reason: HOSPADM

## 2019-03-17 RX ORDER — LORAZEPAM 1 MG/1
1 TABLET ORAL
Status: DISCONTINUED | OUTPATIENT
Start: 2019-03-17 | End: 2019-03-18 | Stop reason: HOSPADM

## 2019-03-17 RX ORDER — SODIUM CHLORIDE 0.9 % (FLUSH) 0.9 %
5-40 SYRINGE (ML) INJECTION AS NEEDED
Status: DISCONTINUED | OUTPATIENT
Start: 2019-03-17 | End: 2019-03-18 | Stop reason: HOSPADM

## 2019-03-17 RX ORDER — LORAZEPAM 2 MG/ML
2 INJECTION INTRAMUSCULAR
Status: ACTIVE | OUTPATIENT
Start: 2019-03-17 | End: 2019-03-17

## 2019-03-17 RX ORDER — MAG HYDROX/ALUMINUM HYD/SIMETH 200-200-20
30 SUSPENSION, ORAL (FINAL DOSE FORM) ORAL
Status: COMPLETED | OUTPATIENT
Start: 2019-03-17 | End: 2019-03-17

## 2019-03-17 RX ADMIN — OXYCODONE HYDROCHLORIDE 5 MG: 5 TABLET ORAL at 16:41

## 2019-03-17 RX ADMIN — ACETAMINOPHEN 650 MG: 325 TABLET, FILM COATED ORAL at 06:54

## 2019-03-17 RX ADMIN — SODIUM CHLORIDE 150 ML/HR: 900 INJECTION, SOLUTION INTRAVENOUS at 07:06

## 2019-03-17 RX ADMIN — LORAZEPAM 1 MG: 1 TABLET ORAL at 17:56

## 2019-03-17 RX ADMIN — SODIUM CHLORIDE 150 ML/HR: 900 INJECTION, SOLUTION INTRAVENOUS at 19:54

## 2019-03-17 RX ADMIN — TRAMADOL HYDROCHLORIDE 50 MG: 50 TABLET, COATED ORAL at 11:49

## 2019-03-17 RX ADMIN — SODIUM CHLORIDE 80 MG: 9 INJECTION, SOLUTION INTRAMUSCULAR; INTRAVENOUS; SUBCUTANEOUS at 04:36

## 2019-03-17 RX ADMIN — LIDOCAINE HYDROCHLORIDE 15 ML: 20 SOLUTION ORAL; TOPICAL at 01:47

## 2019-03-17 RX ADMIN — LORAZEPAM 1 MG: 1 TABLET ORAL at 11:41

## 2019-03-17 RX ADMIN — FOLIC ACID: 5 INJECTION, SOLUTION INTRAMUSCULAR; INTRAVENOUS; SUBCUTANEOUS at 09:00

## 2019-03-17 RX ADMIN — ONDANSETRON 4 MG: 2 INJECTION INTRAMUSCULAR; INTRAVENOUS at 09:37

## 2019-03-17 RX ADMIN — ONDANSETRON 4 MG: 2 INJECTION INTRAMUSCULAR; INTRAVENOUS at 01:48

## 2019-03-17 RX ADMIN — OXYCODONE HYDROCHLORIDE 5 MG: 5 TABLET ORAL at 22:40

## 2019-03-17 RX ADMIN — TRAMADOL HYDROCHLORIDE 50 MG: 50 TABLET, COATED ORAL at 20:02

## 2019-03-17 RX ADMIN — SODIUM CHLORIDE 1000 ML: 900 INJECTION, SOLUTION INTRAVENOUS at 01:48

## 2019-03-17 RX ADMIN — ALUMINUM HYDROXIDE, MAGNESIUM HYDROXIDE, AND SIMETHICONE 30 ML: 200; 200; 20 SUSPENSION ORAL at 01:47

## 2019-03-17 RX ADMIN — POTASSIUM CHLORIDE 40 MEQ: 20 TABLET, EXTENDED RELEASE ORAL at 11:40

## 2019-03-17 NOTE — ED TRIAGE NOTES
Pt arrives from home via [facility] for alcohol intoxication. Reports drinking approx 1 liter vodka. Reports n/v/d. States blood noted to vomit. Swelling noted to upper lip, reports hitting face on door and falling down. Denies loss of consciousness. bgl 126 with ems. Denies daily etoh however seen here in past for same. Lives alone. Per ems pt reports SI, denies making suicidal remarks.  Denies SI/HI

## 2019-03-17 NOTE — PROGRESS NOTES
Visit with patient to build rapport with . Patient is calm. Receptive to  presence. Encouraged and assured patient of our continued care.     Araceli Mullins,  Staff   C: [phone]  /  [email or web address]

## 2019-03-17 NOTE — PROGRESS NOTES
Problem: Self Care Deficits Care Plan (Adult)  Goal: *Acute Goals and Plan of Care (Insert Text)  1. Pt will toilet independently   2. Pt will complete functional mobility for ADLs with mod I  3. Pt will complete lower body dressing with mod I using AE as needed  4. Pt will complete grooming and hygiene at sink with independence  5. Pt will demonstrate independence with HEP to promote increased BUE strength and functional use for ADLs  6. Pt will demonstrate improved safety, problem solving, and attention to complete functional tasks w/o cues     Timeframe: 7 days      OCCUPATIONAL THERAPY: Initial Assessment 3/17/2019  OBSERVATION:    Payor[place] Talia Wu OF [place] / Plan: [place] [employer] [place] MED[place]O / Product Type: Managed Care Medicare /      NAME/AGE/GENDER: Freda Genao is a 59 y.o. female   PRIMARY DIAGNOSIS:  PASQUALE (acute kidney injury) ([facility] [address] [N17.9] PASQUALE (acute kidney injury) ([facility] [address] PASQUALE (acute kidney injury) ([facility] [address]       ICD-10: Treatment Diagnosis:    · Generalized Muscle Weakness (M62.81)   Precautions/Allergies:    falls Patient has no known allergies. ASSESSMENT:     Ms. Shirley Duffy was admitted with PASQUALE, N&V and hematemesis after drinking 1L of vodka. Per ER notes, pt verbalized suicidal ideation. Pt reports that she does not normally drink but her ex- passed away the day before. Pt lives alone in a 7th floor apartment with an elevator, is independent with ADLs at baseline, uses a cane for mobility. Pt endorses 2 recent falls. Pt does not have any family or support systems in the area. This session, pt presented generally weak with deficits in balance, mobility, activity tolerance, and cognition impacting ADLs. Pt A&O X4, however demonstrated decreased attention, memory, and problem solving. Pt completed bed mobility with SBA, stood and completed mobility around room and bathroom with CGA, slightly unsteady and reported feeling \"shaky. \" Pt toileted with SBA-CGA, washed hands at sink with SBA with cues to wash hands after having diarrhea. Pt wearing soiled gown, required cues to recognize and address. Pt changed gown with set up, attempted to lie down long term through and half naked, required cues and redirection to complete task. Pt is below her function baseline and would benefit from skilled OT services to address deficits. This section established at most recent assessment   PROBLEM LIST (Impairments causing functional limitations):  1. Decreased ADL/Functional Activities  2. Decreased Transfer Abilities  3. Decreased Balance  4. Decreased Activity Tolerance  5. Increased Fatigue  6. Decreased Cognition   INTERVENTIONS PLANNED: (Benefits and precautions of occupational therapy have been discussed with the patient.)  1. Activities of daily living training  2. Adaptive equipment training  3. Balance training  4. Therapeutic activity  5. Therapeutic exercise     TREATMENT PLAN: Frequency/Duration: Follow patient 2 times/ week to address above goals. Rehabilitation Potential For Stated Goals: Good     RECOMMENDED REHABILITATION/EQUIPMENT: (at time of discharge pending progress): Due to the probability of continued deficits (see above) this patient will likely need continued skilled occupational therapy after discharge. Equipment:    None at this time              OCCUPATIONAL PROFILE AND HISTORY:   History of Present Injury/Illness (Reason for Referral):  See H&P  Past Medical History/Comorbidities:   Ms. Fani Contreras  has a past medical history of Alcohol abuse (10/31/2015), Alcohol withdrawal ([address] (10/31/2015), Asthma, Other ill-defined conditions(799.89), Other ill-defined conditions(799.89), Psychiatric disorder, and Seizures ([address]  She also has no past medical history of Arthritis, Autoimmune disease ([address] CAD (coronary artery disease), Cancer ([address] Chronic kidney disease, COPD, Dementia, Diabetes ([address] Endocrine disease, Heart failure ([address] Hypertension, Infectious disease, Liver disease, PUD (peptic ulcer disease), Sleep disorder, Stroke ([facility] [address] or Thromboembolus ([facility] [address] Ms. Ashlee Vargas  has a past surgical history that includes hx gyn; hx orthopaedic; hx heent; and colonoscopy (N/A, 1/2/2019). Social History/Living Environment:   Home Environment: Apartment  # Steps to Enter: 0  One/Two Story Residence: One story  Living Alone: Yes  Support Systems: None  Patient Expects to be Discharged to[de-identified] Unknown  Current DME Used/Available at Home: Cane, straight, Shower chair, Walker, rolling, Raised toilet seat, Grab bars  Tub or Shower Type: Shower  Prior Level of Function/Work/Activity:  Independent, lives alone, cane for mobility     Number of Personal Factors/Comorbidities that affect the Plan of Care: Brief history (0):  LOW COMPLEXITY   ASSESSMENT OF OCCUPATIONAL PERFORMANCE[de-identified]   Activities of Daily Living:   Basic ADLs (From Assessment) Complex ADLs (From Assessment)         Grooming/Bathing/Dressing Activities of Daily Living     Cognitive Retraining  Safety/Judgement: Awareness of environment                               Most Recent Physical Functioning:   Gross Assessment:  AROM: Within functional limits  Strength:  Within functional limits  Coordination: Within functional limits               Posture:     Balance:    Bed Mobility:     Wheelchair Mobility:     Transfers:               Patient Vitals for the past 6 hrs:   BP BP Patient Position SpO2 Pulse   03/17/19 0639 152/89      03/17/19 0647 159/84 At rest 94 % 93   03/17/19 0720   95 %    03/17/19 0801 130/69  93 %    03/17/19 0813   95 %    03/17/19 0814   95 %    03/17/19 0832 148/74  94 %    03/17/19 0840   94 %    03/17/19 0841   95 %    03/17/19 1005 128/80 At rest 97 % 66       Mental Status  Neurologic State: Alert  Orientation Level: Oriented X4  Cognition: Decreased attention/concentration, Follows commands  Perception: Appears intact  Perseveration: No perseveration noted  Safety/Judgement: Awareness of environment                          Physical Skills Involved:  1. Balance  2. Strength  3. Activity Tolerance Cognitive Skills Affected (resulting in the inability to perform in a timely and safe manner):  1. Executive Function  2. Short Term Recall  3. Sustained Attention  4. Divided Attention Psychosocial Skills Affected:  1. Habits/Routines  2. Social Roles   Number of elements that affect the Plan of Care: 3-5:  MODERATE COMPLEXITY   CLINICAL DECISION MAKING:   [employer] AM-PAC 6 Clicks   Daily Activity Inpatient Short Form  How much help from another person does the patient currently need. .. Total A Lot A Little None   1. Putting on and taking off regular lower body clothing? [] 1   [] 2   [x] 3   [] 4   2. Bathing (including washing, rinsing, drying)? [] 1   [] 2   [x] 3   [] 4   3. Toileting, which includes using toilet, bedpan or urinal?   [] 1   [] 2   [x] 3   [] 4   4. Putting on and taking off regular upper body clothing? [] 1   [] 2   [x] 3   [] 4   5. Taking care of personal grooming such as brushing teeth? [] 1   [] 2   [] 3   [x] 4   6. Eating meals? [] 1   [] 2   [] 3   [x] 4   © 2007, Trustees of [facility] [employer], under license to [employer]. All rights reserved      Score:  Initial: 20 Most Recent: X (Date: -- )    Interpretation of Tool:  Represents activities that are increasingly more difficult (i.e. Bed mobility, Transfers, Gait). Medical Necessity:     · Patient demonstrates good rehab potential due to higher previous functional level. Reason for Services/Other Comments:  · Patient continues to require skilled intervention due to decreased ADLs and functional performance from baseline.    Use of outcome tool(s) and clinical judgement create a POC that gives a: LOW COMPLEXITY         TREATMENT:   (In addition to Assessment/Re-Assessment sessions the following treatments were rendered)     Pre-treatment Symptoms/Complaints:    Pain: Initial:   Pain Intensity 1: 5  Pain Location 1: Generalized  Pain Intervention(s) 1: Repositioned  Post Session:  same     Assessment/Reassessment only, no treatment provided today    Braces/Orthotics/Lines/Etc:   · IV  · O2 Device: Room air  Treatment/Session Assessment:    · Response to Treatment:  No adverse reaction   · Interdisciplinary Collaboration:   o Occupational Therapist  o Registered Nurse  o Certified Nursing Assistant/Patient Care Technician  · After treatment position/precautions:   o Supine in bed  o Bed/Chair-wheels locked  o Bed in low position  o Call light within reach  o RN notified   · Compliance with Program/Exercises: Will assess as treatment progresses. · Recommendations/Intent for next treatment session: \"Next visit will focus on advancements to more challenging activities and reduction in assistance provided\".   Total Treatment Duration:  OT Patient Time In/Time Out  Time In: 1101  [facility] Rachid Foreman

## 2019-03-17 NOTE — PROGRESS NOTES
Admission skin assessment completed with second RN and reveals the following: sacrum and heels intact gluteal folds are reddened from loose stools. Upper lip is swollen from fall at home.

## 2019-03-17 NOTE — PROGRESS NOTES
TRANSFER - IN REPORT:    Verbal report received from [facility] on Rudi Dias being received from ER for routine progression of care      Report consisted of patients Situation, Background, Assessment and Recommendations(SBAR). Information from the following report(s) SBAR, Kardex, ED Summary, Intake/Output, MAR, Med Rec Status and Cardiac Rhythm SR was reviewed with the receiving nurse. Opportunity for questions and clarification was provided. Assessment completed upon patients arrival to unit and care assumed.

## 2019-03-17 NOTE — ED NOTES
Pt called nursing station to notify that she needed to use the restroom again. Pt had black liquid stool on her bed and nothing on but a shirt. Bedside commode was prepared and pt had small amount of black liquid stool. I tested hemoccult and result was positive. Dr. Margaret Madden notified and orders were verbalized to continue to watch the patient.

## 2019-03-17 NOTE — ED PROVIDER NOTES
Presents with complaint of drinking a liter of vodka today and then having multiple episodes of vomiting with streaks of blood. She reports generalized abdominal pain. She states she only drinks twice a year. She smells strongly of urine. The history is provided by the patient. The history is limited by the condition of the patient. Alcohol intoxication   Primary symptoms include: intoxication. This is a new problem. The current episode started 12 to 24 hours ago. The problem has been gradually worsening. Suspected agents include alcohol. Past Medical History:   Diagnosis Date    Alcohol abuse 10/31/2015    Alcohol withdrawal ([address] 10/31/2015    Asthma     Other ill-defined conditions(799.89)     lumbar and thoracic scoliosis    Other ill-defined conditions(799.89)     irregular heart rate    Psychiatric disorder     ADHD    Seizures ([facility] [address]        Past Surgical History:   Procedure Laterality Date    COLONOSCOPY N/A 1/2/2019    COLONOSCOPY  BMI 20/ROOM 607 performed by Darleen Templeton MD at [facility] HX GYN      hysterectomy    HX HEENT      sinus surgery. deviated septum.  HX ORTHOPAEDIC      right knee         No family history on file.     Social History     Socioeconomic History    Marital status:      Spouse name: Not on file    Number of children: Not on file    Years of education: Not on file    Highest education level: Not on file   Social Needs    Financial resource strain: Not on file    Food insecurity - worry: Not on file    Food insecurity - inability: Not on file    Transportation needs - medical: Not on file   [employer] needs - non-medical: Not on file   Occupational History    Not on file   Tobacco Use    Smoking status: Current Every Day Smoker     Packs/day: 0.25    Smokeless tobacco: Never Used   Substance and Sexual Activity    Alcohol use: No     Alcohol/week: 0.0 oz    Drug use: No    Sexual activity: Not on file   Other Topics Concern    Not on file   Social History Narrative    Not on file         ALLERGIES: Patient has no known allergies. Review of Systems   All other systems reviewed and are negative. Vitals:    03/17/19 0113   BP: (!) 147/98   Pulse: 91   Resp: 20   Temp: 98.3 °F (36.8 °C)   SpO2: 99%   Weight: 54.4 kg (120 lb)   Height: 5' 5\" (1.651 m)            Physical Exam   Constitutional: She is oriented to person, place, and time. She appears well-developed and well-nourished. No distress. HENT:   Head: Normocephalic and atraumatic. Eyes: Conjunctivae are normal. Right eye exhibits no discharge. Left eye exhibits no discharge. Neck: Normal range of motion. Neck supple. Cardiovascular: Normal rate and regular rhythm. Pulmonary/Chest: Effort normal and breath sounds normal. No respiratory distress. Abdominal: Soft. She exhibits no distension. There is tenderness (diffusely). Musculoskeletal: Normal range of motion. She exhibits no edema. Neurological: She is alert and oriented to person, place, and time. No cranial nerve deficit. Skin: Skin is warm and dry. Capillary refill takes less than 2 seconds. She is not diaphoretic. Psychiatric: She has a normal mood and affect. Her behavior is normal.   Nursing note and vitals reviewed. MDM  Number of Diagnoses or Management Options  Diagnosis management comments: Patient was covered in black stool that was heme positive. Given Protonix.   She has acute kidney injury and will be admitted to the hospitalist.  She has been seen and admitted several times in the past for alcohol abuse       Amount and/or Complexity of Data Reviewed  Clinical lab tests: ordered and reviewed  Review and summarize past medical records: yes  Discuss the patient with other providers: yes  Independent visualization of images, tracings, or specimens: yes (SR no ST elevation)    Risk of Complications, Morbidity, and/or Mortality  Presenting problems: high  Diagnostic procedures: moderate  Management options: high    Patient Progress  Patient progress: improved         Procedures

## 2019-03-17 NOTE — ROUTINE PROCESS
TRANSFER - OUT REPORT:    Verbal report given to Crystal Parveen on  Rohm and Tiffany  being transferred to [phone] for routine progression of care       Report consisted of patients Situation, Background, Assessment and   Recommendations(SBAR). Information from the following report(s) SBAR was reviewed with the receiving nurse. Lines:   Peripheral IV 03/17/19 Right Forearm (Active)   Site Assessment Clean, dry, & intact 3/17/2019  9:21 AM   Phlebitis Assessment 0 3/17/2019  9:21 AM   Infiltration Assessment 0 3/17/2019  9:21 AM   Dressing Status Clean, dry, & intact 3/17/2019  9:21 AM   Dressing Type Transparent 3/17/2019  9:21 AM   Hub Color/Line Status Pink 3/17/2019  9:21 AM        Opportunity for questions and clarification was provided.       Patient transported with:   [employer]

## 2019-03-17 NOTE — PROGRESS NOTES
Bedside and Verbal shift change report given to self (oncoming nurse) by Russell Martinez RN (offgoing nurse). Report included the following information SBAR, Kardex, MAR and Recent Results.

## 2019-03-17 NOTE — ED NOTES
Report received from [facility], [address] [place] [address]. Pt resting on stretcher with eyes closed.

## 2019-03-17 NOTE — PROGRESS NOTES
Patient admitted overnight due to alcohol abuse/ possible GI bleed. Drug seeking behavior for ativan/narcotics. H&H stable. Will start her on a GI soft diet. Monitor VS and Hgb level.

## 2019-03-17 NOTE — H&P
HOSPITALIST INITIAL HISTORY AND PHYSICAL    NAME:  Essence Luther   Age:  59 y.o.  :   1955   MRN:   [id number]  PCP: None  Consulting MD:  Treatment Team: Attending Provider: Melisa Ryan MD; Primary Nurse: Slim Mckinley    CHIEF COMPLAINT: nausea/vomiting    HISTORY OF PRESENT ILLNESS:   Essence Luther is a 59 y.o. female with a past medical history of alcohol dependence who presents to the ER with complaint of nausea and vomiting with streaks of blood after drinking a liter of vodka yesterday. The patient is currently very somnolent and uncooperative with exam and interview. REVIEW OF SYSTEMS: Comprehensive ROS unable to be performed given poor cooperativity. Past Medical History:   Diagnosis Date    Alcohol abuse 10/31/2015    Alcohol withdrawal ([facility] [address] 10/31/2015    Asthma     Other ill-defined conditions(799.89)     lumbar and thoracic scoliosis    Other ill-defined conditions(799.89)     irregular heart rate    Psychiatric disorder     ADHD    Seizures ([facility] [address]         Past Surgical History:   Procedure Laterality Date    COLONOSCOPY N/A 2019    COLONOSCOPY  BMI 20/ROOM 607 performed by Ayah Gutierrez MD at [address] [facility] HX GYN      hysterectomy    HX HEENT      sinus surgery. deviated septum.  HX ORTHOPAEDIC      right knee       Prior to Admission Medications   Prescriptions Last Dose Informant Patient Reported? Taking? LORazepam (ATIVAN) 1 mg tablet   No No   Sig: Take 1 Tab by mouth every six (6) hours as needed for Anxiety. Max Daily Amount: 4 mg. pantoprazole (PROTONIX) 40 mg tablet   No No   Sig: Take 1 Tab by mouth Daily (before breakfast). Indications: while taking high dose ibuprofen for rib fractures   potassium chloride (K-DUR, KLOR-CON) 20 mEq tablet   No No   Sig: Take 2 Tabs by mouth daily. senna-docusate (PERICOLACE) 8.6-50 mg per tablet   No No   Sig: Take 1 Tab by mouth two (2) times a day.       Facility-Administered Medications: None No Known Allergies    FAMILY HISTORY: Reviewed. Negative except No family history on file. Social History     Tobacco Use    Smoking status: Current Every Day Smoker     Packs/day: 0.25    Smokeless tobacco: Never Used   Substance Use Topics    Alcohol use: No     Alcohol/week: 0.0 oz         Objective:     Visit Vitals  BP (!) 147/98 (BP 1 Location: Right arm, BP Patient Position: At rest)   Pulse 91   Temp 98.3 °F (36.8 °C)   Resp 20   Ht 5' 5\" (1.651 m)   Wt 54.4 kg (120 lb)   SpO2 99%   BMI 19.97 kg/m²      Temp (24hrs), Av.3 °F (36.8 °C), Min:98.3 °F (36.8 °C), Max:98.3 °F (36.8 °C)    Oxygen Therapy  O2 Sat (%): 99 % (19 0113)  O2 Device: Room air (19 0113)  Physical Exam:  General:    The patient is a middle aged female, smelling of urine in no acute distress. Head:   Normocephalic/atraumatic. Eyes:  No palpebral pallor or scleral icterus. ENT:  External auricular and nasal exam within normal limits. Mucous membranes are moist.  Neck:  Supple, non-tender, no JVD. Lungs:   Clear to auscultation bilaterally without wheezes or crackles. No respiratory distress or accessory muscle use. Heart:   Regular rate and rhythm, without murmurs, rubs, or gallops. Abdomen:   Soft, non-tender, non-distended with normoactive bowel sounds. Genitourinary: No tenderness over the bladder or bilateral CVAs. Extremities: Without clubbing, cyanosis, or edema. Skin:     Normal color, texture, and turgor. No rashes, lesions, or jaundice. Pulses: Radial and dorsalis pedis pulses present 2+ bilaterally. Capillary refill <2s. Neurologic: Somnolent, but arousable to voice. CN II-XII grossly intact and symmetrical.     Moving all four extremities well with no focal deficits.   Psychiatric: Somnolent, uncooperative    Data Review:   Recent Results (from the past 24 hour(s))   CBC WITH AUTOMATED DIFF    Collection Time: 19  1:17 AM   Result Value Ref Range    WBC 12.7 (H) 4.3 - 11.1 K/uL    RBC 4.86 4.05 - 5.2 M/uL    HGB 14.6 11.7 - 15.4 g/dL    HCT 42.2 35.8 - 46.3 %    MCV 86.8 79.6 - 97.8 FL    MCH 30.0 26.1 - 32.9 PG    MCHC 34.6 31.4 - 35.0 g/dL    RDW 13.3 11.9 - 14.6 %    PLATELET 549 (H) 942 - 450 K/uL    MPV 9.1 (L) 9.4 - 12.3 FL    ABSOLUTE NRBC 0.00 0.0 - 0.2 K/uL    DF AUTOMATED      NEUTROPHILS 88 (H) 43 - 78 %    LYMPHOCYTES 6 (L) 13 - 44 %    MONOCYTES 5 4.0 - 12.0 %    EOSINOPHILS 0 (L) 0.5 - 7.8 %    BASOPHILS 0 0.0 - 2.0 %    IMMATURE GRANULOCYTES 0 0.0 - 5.0 %    ABS. NEUTROPHILS 11.2 (H) 1.7 - 8.2 K/UL    ABS. LYMPHOCYTES 0.8 0.5 - 4.6 K/UL    ABS. MONOCYTES 0.7 0.1 - 1.3 K/UL    ABS. EOSINOPHILS 0.0 0.0 - 0.8 K/UL    ABS. BASOPHILS 0.0 0.0 - 0.2 K/UL    ABS. IMM. GRANS. 0.1 0.0 - 0.5 K/UL   METABOLIC PANEL, COMPREHENSIVE    Collection Time: 03/17/19  1:17 AM   Result Value Ref Range    Sodium 130 (L) 136 - 145 mmol/L    Potassium 3.0 (L) 3.5 - 5.1 mmol/L    Chloride 96 (L) 98 - 107 mmol/L    CO2 24 21 - 32 mmol/L    Anion gap 10 7 - 16 mmol/L    Glucose 146 (H) 65 - 100 mg/dL    BUN 10 8 - 23 MG/DL    Creatinine 1.56 (H) 0.6 - 1.0 MG/DL    GFR est AA 43 (L) >60 ml/min/1.73m2    GFR est non-AA 36 (L) >60 ml/min/1.73m2    Calcium 8.4 8.3 - 10.4 MG/DL    Bilirubin, total 0.4 0.2 - 1.1 MG/DL    ALT (SGPT) 10 (L) 12 - 65 U/L    AST (SGOT) 20 15 - 37 U/L    Alk.  phosphatase 133 50 - 136 U/L    Protein, total 7.4 6.3 - 8.2 g/dL    Albumin 3.8 3.2 - 4.6 g/dL    Globulin 3.6 (H) 2.3 - 3.5 g/dL    A-G Ratio 1.1 (L) 1.2 - 3.5     LIPASE    Collection Time: 03/17/19  1:17 AM   Result Value Ref Range    Lipase 139 73 - 393 U/L   ETHYL ALCOHOL    Collection Time: 03/17/19  1:17 AM   Result Value Ref Range    ALCOHOL(ETHYL),SERUM <3 MG/DL   CK    Collection Time: 03/17/19  1:17 AM   Result Value Ref Range    CK 90 21 - 215 U/L   URINE MICROSCOPIC    Collection Time: 03/17/19  2:16 AM   Result Value Ref Range    WBC 0-3 0 /hpf    RBC 5-10 0 /hpf    Epithelial cells 0-3 0 /hpf    Bacteria 0 0 /hpf    Casts 0 0 /lpf    Crystals, urine 0 0 /LPF    Mucus 0 0 /lpf    Other observations RESULTS VERIFIED MANUALLY         Imaging /Procedures /Studies:  Ct Head Wo Cont    Result Date: 3/17/2019  IMPRESSION: No acute process. Assessment and Plan:     Principal Problem:    PASQUALE (acute kidney injury) ([facility] [address] (3/17/2019)    IVF, follow BMP. Active Problems:    Fall (9/9/2017)    PT/OT consults. Melena/hematemesis. Likely related to alcohol consumption. FOllow hemoglobin. If hemoglobin drops, consider GI consult      Dehydration (3/17/2019)    IVF. Nausea & vomiting (3/17/2019)    Secondary to alcohol intoxication. IV Zofran/Phenergan PRN. Alcohol abuse (10/31/2015)    [facility] protocol. DVT Prophylaxis: SCDs given melena      Code Status: FULL CODE      Disposition: Observe on med/surg for evaluation and treatment as per above.       Anticipated discharge: < 2 midnights     Signed By: Kendall Arvizu MD     March 17, 2019

## 2019-03-18 VITALS
SYSTOLIC BLOOD PRESSURE: 137 MMHG | DIASTOLIC BLOOD PRESSURE: 89 MMHG | OXYGEN SATURATION: 97 % | HEART RATE: 77 BPM | RESPIRATION RATE: 20 BRPM | TEMPERATURE: 99.2 F | WEIGHT: 107.1 LBS | HEIGHT: 65 IN | BODY MASS INDEX: 17.84 KG/M2

## 2019-03-18 PROBLEM — N17.9 AKI (ACUTE KIDNEY INJURY) (HCC): Status: RESOLVED | Noted: 2019-03-17 | Resolved: 2019-03-18
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
## 2019-03-18 PROBLEM — E86.0 DEHYDRATION: Status: RESOLVED | Noted: 2019-03-17 | Resolved: 2019-03-18

## 2019-03-18 PROBLEM — R11.2 NAUSEA & VOMITING: Status: RESOLVED | Noted: 2019-03-17 | Resolved: 2019-03-18

## 2019-03-18 LAB
ANION GAP SERPL CALC-SCNC: 8 MMOL/L (ref 7–16)
BASOPHILS # BLD: 0 K/UL (ref 0–0.2)
BASOPHILS NFR BLD: 0 % (ref 0–2)
BUN SERPL-MCNC: 2 MG/DL (ref 8–23)
CALCIUM SERPL-MCNC: 8.2 MG/DL (ref 8.3–10.4)
CHLORIDE SERPL-SCNC: 104 MMOL/L (ref 98–107)
CO2 SERPL-SCNC: 27 MMOL/L (ref 21–32)
CREAT SERPL-MCNC: 0.76 MG/DL (ref 0.6–1)
DIFFERENTIAL METHOD BLD: ABNORMAL
EOSINOPHIL # BLD: 0 K/UL (ref 0–0.8)
EOSINOPHIL NFR BLD: 0 % (ref 0.5–7.8)
ERYTHROCYTE [DISTWIDTH] IN BLOOD BY AUTOMATED COUNT: 13.4 % (ref 11.9–14.6)
GLUCOSE SERPL-MCNC: 94 MG/DL (ref 65–100)
HCT VFR BLD AUTO: 35.8 % (ref 35.8–46.3)
HGB BLD-MCNC: 11.9 G/DL (ref 11.7–15.4)
IMM GRANULOCYTES # BLD AUTO: 0 K/UL (ref 0–0.5)
IMM GRANULOCYTES NFR BLD AUTO: 0 % (ref 0–5)
LYMPHOCYTES # BLD: 1.7 K/UL (ref 0.5–4.6)
LYMPHOCYTES NFR BLD: 21 % (ref 13–44)
MCH RBC QN AUTO: 30.1 PG (ref 26.1–32.9)
MCHC RBC AUTO-ENTMCNC: 33.2 G/DL (ref 31.4–35)
MCV RBC AUTO: 90.6 FL (ref 79.6–97.8)
MONOCYTES # BLD: 0.8 K/UL (ref 0.1–1.3)
MONOCYTES NFR BLD: 11 % (ref 4–12)
NEUTS SEG # BLD: 5.3 K/UL (ref 1.7–8.2)
NEUTS SEG NFR BLD: 68 % (ref 43–78)
NRBC # BLD: 0 K/UL (ref 0–0.2)
PLATELET # BLD AUTO: 344 K/UL (ref 150–450)
PMV BLD AUTO: 9.8 FL (ref 9.4–12.3)
POTASSIUM SERPL-SCNC: 2.9 MMOL/L (ref 3.5–5.1)
RBC # BLD AUTO: 3.95 M/UL (ref 4.05–5.2)
SODIUM SERPL-SCNC: 139 MMOL/L (ref 136–145)
WBC # BLD AUTO: 7.8 K/UL (ref 4.3–11.1)

## 2019-03-18 PROCEDURE — 74011250637 HC RX REV CODE- 250/637: Performed by: FAMILY MEDICINE

## 2019-03-18 PROCEDURE — 96361 HYDRATE IV INFUSION ADD-ON: CPT

## 2019-03-18 PROCEDURE — 74011250637 HC RX REV CODE- 250/637: Performed by: INTERNAL MEDICINE

## 2019-03-18 PROCEDURE — 80048 BASIC METABOLIC PNL TOTAL CA: CPT

## 2019-03-18 PROCEDURE — 74011000250 HC RX REV CODE- 250: Performed by: INTERNAL MEDICINE

## 2019-03-18 PROCEDURE — 74011250636 HC RX REV CODE- 250/636: Performed by: FAMILY MEDICINE

## 2019-03-18 PROCEDURE — 96376 TX/PRO/DX INJ SAME DRUG ADON: CPT

## 2019-03-18 PROCEDURE — 85025 COMPLETE CBC W/AUTO DIFF WBC: CPT

## 2019-03-18 PROCEDURE — 36415 COLL VENOUS BLD VENIPUNCTURE: CPT

## 2019-03-18 PROCEDURE — 99218 HC RM OBSERVATION: CPT

## 2019-03-18 PROCEDURE — C9113 INJ PANTOPRAZOLE SODIUM, VIA: HCPCS | Performed by: INTERNAL MEDICINE

## 2019-03-18 PROCEDURE — 74011250636 HC RX REV CODE- 250/636: Performed by: INTERNAL MEDICINE

## 2019-03-18 RX ADMIN — LORAZEPAM 1 MG: 1 TABLET ORAL at 00:10

## 2019-03-18 RX ADMIN — POTASSIUM CHLORIDE 40 MEQ: 20 TABLET, EXTENDED RELEASE ORAL at 09:19

## 2019-03-18 RX ADMIN — OXYCODONE HYDROCHLORIDE 5 MG: 5 TABLET ORAL at 05:33

## 2019-03-18 RX ADMIN — SODIUM CHLORIDE 150 ML/HR: 900 INJECTION, SOLUTION INTRAVENOUS at 00:12

## 2019-03-18 RX ADMIN — SODIUM CHLORIDE 40 MG: 9 INJECTION, SOLUTION INTRAMUSCULAR; INTRAVENOUS; SUBCUTANEOUS at 09:19

## 2019-03-18 RX ADMIN — LORAZEPAM 1 MG: 1 TABLET ORAL at 09:19

## 2019-03-18 NOTE — DISCHARGE INSTRUCTIONS
Patient Education        Acute Alcohol Intoxication: Care Instructions  Your Care Instructions    You have had treatment to help your body rid itself of alcohol. Too much alcohol upsets the body's fluid balance. Your doctor may have given you fluids and vitamins. For some people, drinking too much alcohol is a one-time event. For others, it is an ongoing problem. In either case, it is serious. It can be life-threatening. Follow-up care is a key part of your treatment and safety. Be sure to make and go to all appointments, and call your doctor if you are having problems. It's also a good idea to know your test results and keep a list of the medicines you take. How can you care for yourself at home? · Do not drink and drive. · Be safe with medicines. Take your medicines exactly as prescribed. Call your doctor if you think you are having a problem with your medicine. · Your doctor may have prescribed disulfiram (Antabuse). Do not drink any alcohol while you are taking this medicine. You may have severe or even life-threatening side effects from even small amounts of alcohol. · If you were given medicine to prevent nausea, be sure to take it exactly as prescribed. · Before you take any medicine, tell your doctor if:  ? You have had a bad reaction to any medicines in the past.  ? You are taking other medicines, including over-the-counter ones, or have other health problems. ? You are or could be pregnant. · Be prepared to have some symptoms of withdrawal in the next few days. · Drink plenty of liquids in the next few days. · Seek help if you need it to stop drinking. Getting counseling and joining a support group can help you stay sober. Try a support group such as Alcoholics Anonymous. · Avoid alcohol when you take medicines. It can react with many medicines and cause serious problems. When should you call for help? Call 911 anytime you think you may need emergency care.  For example, call if:    · You feel confused and are seeing things that are not there.     · You are thinking about killing yourself or hurting others.     · You have a seizure.     · You vomit blood or what looks like coffee grounds.    Call your doctor now or seek immediate medical care if:    · You have trembling, restlessness, sweating, and other withdrawal symptoms that are new or that get worse.     · Your withdrawal symptoms come back after not bothering you for days or weeks.     · You can't stop vomiting.    Watch closely for changes in your health, and be sure to contact your doctor if:    · You need help to stop drinking. Where can you learn more? Go to http://sanchez-mila.info/. Enter T102 in the search box to learn more about \"Acute Alcohol Intoxication: Care Instructions. \"  Current as of: May 7, 2018  Content Version: 11.9  © 4596-3218 [employer]. Care instructions adapted under license by [employer] (which disclaims liability or warranty for this information). If you have questions about a medical condition or this instruction, always ask your healthcare professional. [address] any warranty or liability for your use of this information. Patient Education        Learning About Alcohol Misuse  What is alcohol misuse? Alcohol misuse means drinking so much that it causes problems for you or others. Early problems with alcohol can start at home. You may argue with loved ones about how much you're drinking. Your job may be affected because of drinking. You may drink when it's dangerous or illegal, such as when you drive. Drinking too much for a long time can lead to health conditions like high blood pressure and liver problems. What are the symptoms? Symptoms of alcohol misuse may include:  · Drinking much more than you planned. · Drinking even though it's causing problems for you or others.   · Putting yourself in situations where you might get hurt.  · Wanting to cut down or stop drinking, but not being able to. · Feeling guilty about how much you're drinking. How is alcohol misuse treated? Getting help for problems with alcohol is up to you. But you don't have to do it alone. There are many people and kinds of treatments to help with alcohol problems. Talking to your doctor is the first step. When you get a doctor's help, treatment for alcohol problems can be safer and quicker. Treatment options can include:  · Treatment programs. Examples are group therapy, one or more types of counseling, and alcohol education. · Medicines. A doctor or counselor can help you know what kinds of medicines might help with cravings. · Free social support groups. These groups include AA (Alcoholics Anonymous) and SMART (Self-Management and Recovery Training). Your doctor can help you decide which type of program is best for you. Follow-up care is a key part of your treatment and safety. Be sure to make and go to all appointments, and call your doctor if you are having problems. It's also a good idea to know your test results and keep a list of the medicines you take. Where can you learn more? Go to http://sanchez-mila.info/. Enter [id number] in the search box to learn more about \"Learning About Alcohol Misuse. \"  Current as of: May 7, 2018  Content Version: 11.9  © 8861-9366 [employer]. Care instructions adapted under license by [employer] (which disclaims liability or warranty for this information). If you have questions about a medical condition or this instruction, always ask your healthcare professional. [place] [address] any warranty or liability for your use of this information.

## 2019-03-18 NOTE — PROGRESS NOTES
Bedside and Verbal shift change report given to Octavia Rubin RN (oncoming nurse) by self Rocio Clark nurse). Report included the following information SBAR, Kardex, MAR, Recent Results and Med Rec Status.

## 2019-03-18 NOTE — DISCHARGE SUMMARY
Hospitalist Discharge Summary     Admit Date:  3/17/2019  1:16 AM   Name:  Joshua Avila   Age:  59 y.o.  :  1955   MRN:  [id number]   PCP:  None  Treatment Team: Attending Provider: Marko Stewart MD; Physical Therapist: Stephanie Pereira PT, DPT    Problem List for this Hospitalization:  Hospital Problems as of 3/18/2019 Date Reviewed: 2019          Codes Class Noted - Resolved POA    Alcohol abuse ICD-10-CM: F10.10  ICD-9-CM: 305.00  10/31/2015 - Present Yes        * (Principal) RESOLVED: [facility]acute [facility]) [facility] ICD-10-CM: N17.9  ICD-9-CM: 584.9  3/17/2019 - 3/18/2019 Yes        RESOLVED: Dehydration ICD-10-CM: E86.0  ICD-9-CM: 276.51  3/17/2019 - 3/18/2019 Yes        RESOLVED: Nausea & vomiting ICD-10-CM: R11.2  ICD-9-CM: 787.01  3/17/2019 - 3/18/2019 Yes        RESOLVED: Fall ICD-10-CM: W19. Mikeean Carbon  ICD-9-CM: E888.9  2017 - 3/18/2019 Yes          Hospital Course:  60 y/o WF with alcohol abuse and esophagitis/gastritis history presented with N/V after binge drinking vodka one day PTA. PASQUALE was noted. Hb was 14 on admission. She was admitted and given IVFs and nausea/pain control. PASQUALE resolved and Hb is at baseline. She is tolerating PO. There has been suspicion of secondary gain during her current and prior hospitalizations. Per nursing, she has been ambulating without assistance but when told she is being discharged she claimed she is too weak. Nursing will walk patient. Abstinence from alcohol is strongly encouraged. Hospital course otherwise unremarkable. Disposition: Home or Self Care  Activity: Activity as tolerated  Diet: DIET GI SOFT No options chosen    Follow up instructions, current diet orders, and discharge meds at bottom of this note. Plan was discussed with patient, nursing. All questions answered. Patient was stable at time of discharge.     Diagnostic Imaging/Tests:   Xr Chest Pa Lat    Result Date: 3/17/2019  Chest x-ray PA and lateral 3/17/2019 INDICATION: Vomiting blood Comparison 12/30/2018 Left lower lobe nipple shadow is noted. There is thoracic scoliosis. Lung fields, cardiac silhouette, and soft tissues are intact. IMPRESSION: Thoracic scoliosis, clear lung fields    Ct Head Wo Cont    Result Date: 3/17/2019  EXAM: Noncontrast CT head. INDICATION: Pain, trauma. COMPARISON: December 30, 2018. TECHNIQUE: Noncontrast CT images of the head were obtained. Radiation dose reduction techniques were used for this study. Our CT scanners use one or all of the following:  Automated exposure control, adjustment of the mA or kV according to patient size, iterative reconstruction. FINDINGS: Brain volume is appropriate for age. No acute infarct, hemorrhage or mass is identified. There is no mass effect, midline shift or depressed fracture. The visualized paranasal sinuses and mastoid air cells are clear. IMPRESSION: No acute process. Echocardiogram results:  No results found for this visit on 03/17/19.     Procedures done this admission:  * No surgery found *    All Micro Results     Procedure Component Value Units Date/Time    CULTURE, BLOOD [[id number]] Collected:  03/17/19 0646    Order Status:  Completed Specimen:  Blood Updated:  03/17/19 0833    CULTURE, BLOOD [[id number]] Collected:  03/17/19 0646    Order Status:  Completed Specimen:  Blood Updated:  03/17/19 0833          Labs: Results:       BMP, Mg, Phos Recent Labs     03/18/19  0456 03/17/19 0627 03/17/19  0117    136 130*   K 2.9* 3.5 3.0*    102 96*   CO2 27 25 24   AGAP 8 9 10   BUN 2* 7* 10   CREA 0.76 1.41* 1.56*   CA 8.2* 8.2* 8.4   GLU 94 110* 146*      CBC Recent Labs     03/18/19  0456 03/17/19  1449 03/17/19 0627 03/17/19  0117   WBC 7.8  --  13.0* 12.7*   RBC 3.95*  --  4.71 4.86   HGB 11.9 13.1 14.3 14.6   HCT 35.8 38.6 41.3 42.2     --  445 501*   GRANS 68  --  81* 88*   LYMPH 21  --  9* 6*   EOS 0*  --  0* 0*   MONOS 11  --  10 5   BASOS 0  --  0 0   IG 0  --  0 0 ANEU 5.3  --  10.6* 11.2*   ABL 1.7  --  1.1 0.8   JN 0.0  --  0.0 0.0   ABM 0.8  --  1.2 0.7   ABB 0.0  --  0.0 0.0   AIG 0.0  --  0.0 0.1      LFT Recent Labs     03/17/19  0117   SGOT 20   ALT 10*      TP 7.4   ALB 3.8   GLOB 3.6*   AGRAT 1.1*      Cardiac Testing Lab Results   Component Value Date/Time    CK 90 03/17/2019 01:17 AM      Coagulation Tests Lab Results   Component Value Date/Time    Prothrombin time 13.2 01/01/2019 05:59 AM    Prothrombin time 11.2 05/29/2017 07:00 AM    INR 1.0 01/01/2019 05:59 AM    INR 1.0 05/29/2017 07:00 AM    aPTT 28.3 05/29/2017 07:00 AM      A1c No results found for: HBA1C, HGBE8, [id number]   Lipid Panel No results found for: CHOL, CHOLPOCT, CHOLX, CHLST, CHOLV, 490961, HDL, LDL, LDLC, DLDLP, 684325, VLDLC, VLDL, TGLX, TRIGL, TRIGP, TGLPOCT, CHHD, [facility]   Thyroid Panel Lab Results   Component Value Date/Time    TSH 1.970 01/02/2019 01:35 PM        Most Recent UA Lab Results   Component Value Date/Time    Color SNEHAL 12/30/2018 02:23 PM    Appearance TURBID 12/30/2018 02:23 PM    Specific gravity 1.026 (H) 12/30/2018 02:23 PM    pH (UA) 6.0 12/30/2018 02:23 PM    Protein 100 (A) 12/30/2018 02:23 PM    Glucose NEGATIVE  12/30/2018 02:23 PM    Ketone 40 (A) 12/30/2018 02:23 PM    Bilirubin SMALL (A) 12/30/2018 02:23 PM    Blood MODERATE (A) 12/30/2018 02:23 PM    Urobilinogen 0.2 12/30/2018 02:23 PM    Nitrites NEGATIVE  12/30/2018 02:23 PM    Leukocyte Esterase MODERATE (A) 12/30/2018 02:23 PM    WBC 0-3 03/17/2019 02:16 AM    RBC 5-10 03/17/2019 02:16 AM    Epithelial cells 0-3 03/17/2019 02:16 AM    Bacteria 0 03/17/2019 02:16 AM    Casts 0 03/17/2019 02:16 AM    Crystals, urine 0 03/17/2019 02:16 AM    Mucus 0 03/17/2019 02:16 AM    Other observations RESULTS VERIFIED MANUALLY 03/17/2019 02:16 AM        No Known Allergies  Immunization History   Administered Date(s) Administered    Influenza Vaccine 10/23/2013, 07/01/2014, 10/06/2017    Influenza Vaccine Whole 02/06/2007    TB Skin Test (PPD) Intradermal 10/31/2015, 09/10/2017, 12/31/2018    TD Vaccine 02/06/2004    ZZZ-RETIRED (DO NOT USE) Pneumococcal Vaccine (Unspecified Type) 02/06/2007       All Labs from Last 24 Hrs:  Recent Results (from the past 24 hour(s))   HGB & HCT    Collection Time: 03/17/19  2:49 PM   Result Value Ref Range    HGB 13.1 11.7 - 15.4 g/dL    HCT 38.6 35.8 - 46.3 %   CBC WITH AUTOMATED DIFF    Collection Time: 03/18/19  4:56 AM   Result Value Ref Range    WBC 7.8 4.3 - 11.1 K/uL    RBC 3.95 (L) 4.05 - 5.2 M/uL    HGB 11.9 11.7 - 15.4 g/dL    HCT 35.8 35.8 - 46.3 %    MCV 90.6 79.6 - 97.8 FL    MCH 30.1 26.1 - 32.9 PG    MCHC 33.2 31.4 - 35.0 g/dL    RDW 13.4 11.9 - 14.6 %    PLATELET 689 740 - 315 K/uL    MPV 9.8 9.4 - 12.3 FL    ABSOLUTE NRBC 0.00 0.0 - 0.2 K/uL    DF AUTOMATED      NEUTROPHILS 68 43 - 78 %    LYMPHOCYTES 21 13 - 44 %    MONOCYTES 11 4.0 - 12.0 %    EOSINOPHILS 0 (L) 0.5 - 7.8 %    BASOPHILS 0 0.0 - 2.0 %    IMMATURE GRANULOCYTES 0 0.0 - 5.0 %    ABS. NEUTROPHILS 5.3 1.7 - 8.2 K/UL    ABS. LYMPHOCYTES 1.7 0.5 - 4.6 K/UL    ABS. MONOCYTES 0.8 0.1 - 1.3 K/UL    ABS. EOSINOPHILS 0.0 0.0 - 0.8 K/UL    ABS. BASOPHILS 0.0 0.0 - 0.2 K/UL    ABS. IMM.  GRANS. 0.0 0.0 - 0.5 K/UL   METABOLIC PANEL, BASIC    Collection Time: 03/18/19  4:56 AM   Result Value Ref Range    Sodium 139 136 - 145 mmol/L    Potassium 2.9 (LL) 3.5 - 5.1 mmol/L    Chloride 104 98 - 107 mmol/L    CO2 27 21 - 32 mmol/L    Anion gap 8 7 - 16 mmol/L    Glucose 94 65 - 100 mg/dL    BUN 2 (L) 8 - 23 MG/DL    Creatinine 0.76 0.6 - 1.0 MG/DL    GFR est AA >60 >60 ml/min/1.73m2    GFR est non-AA >60 >60 ml/min/1.73m2    Calcium 8.2 (L) 8.3 - 10.4 MG/DL       Current Med List in Hospital:   Current Facility-Administered Medications   Medication Dose Route Frequency    potassium chloride (K-DUR, KLOR-CON) SR tablet 40 mEq  40 mEq Oral DAILY    sodium chloride (NS) flush 5-40 mL  5-40 mL IntraVENous PRN    promethazine (PHENERGAN) with saline injection 12.5 mg  12.5 mg IntraVENous Q4H PRN    sodium chloride (NS) flush 5-40 mL  5-40 mL IntraVENous PRN    acetaminophen (TYLENOL) tablet 650 mg  650 mg Oral Q4H PRN    ondansetron (ZOFRAN) injection 4 mg  4 mg IntraVENous Q4H PRN    magnesium hydroxide (MILK OF MAGNESIA) 400 mg/5 mL oral suspension 30 mL  30 mL Oral DAILY PRN    pantoprazole (PROTONIX) 40 mg in sodium chloride 0.9% 10 mL injection  40 mg IntraVENous DAILY    oxyCODONE IR (ROXICODONE) tablet 5 mg  5 mg Oral Q6H PRN    traMADol (ULTRAM) tablet 50 mg  50 mg Oral U6P PRN    folic acid (FOLVITE) 1 mg, thiamine (B-1) 100 mg in 0.9% sodium chloride 50 mL ivpb   IntraVENous DAILY    LORazepam (ATIVAN) tablet 1 mg  1 mg Oral Q6H PRN       Discharge Exam:  Patient Vitals for the past 24 hrs:   Temp Pulse Resp BP SpO2   03/18/19 0518 98.9 °F (37.2 °C) 80 16 (!) 160/95 96 %   03/18/19 0116 99.6 °F (37.6 °C) 91 18 151/86 95 %   03/17/19 2043 98.6 °F (37 °C) 75 18 144/88 97 %   03/17/19 1730 99.3 °F (37.4 °C) 81 18 159/86 99 %   03/17/19 1311 98.5 °F (36.9 °C) 79 19 132/87 97 %   03/17/19 1005 97.5 °F (36.4 °C) 78 18 128/80 97 %   03/17/19 0923 98.9 °F (37.2 °C)       03/17/19 0841     95 %   03/17/19 0840     94 %   03/17/19 0832    148/74 94 %     Oxygen Therapy  O2 Sat (%): 96 % (03/18/19 0518)  Pulse via Oximetry: 77 beats per minute (03/17/19 0841)  O2 Device: Room air (03/18/19 0406)    Intake/Output Summary (Last 24 hours) at 3/18/2019 0826  Last data filed at 3/18/2019 0521  Gross per 24 hour   Intake 2820 ml   Output 1850 ml   Net 970 ml       *Note that automatically entered I/Os may not be accurate; dependent on patient compliance with collection and accurate  by assistants. General:    Thin. Alert. Eyes:   Normal sclera. Extraocular movements intact. ENT:  Normocephalic, atraumatic. Moist mucous membranes  CV:   Regular rate and rhythm. No murmur, rub, or gallop.     Lungs: Clear to auscultation bilaterally. No wheezing, rhonchi, or rales. Abdomen: Soft, nontender, nondistended. Bowel sounds normal.   Extremities: Warm and dry. No cyanosis or edema. Neurologic: CN II-XII grossly intact. Sensation intact. Skin:     No rashes or jaundice. Psych:  Normal mood and affect. Discharge Info:   Current Discharge Medication List      CONTINUE these medications which have NOT CHANGED    Details   potassium chloride (K-DUR, KLOR-CON) 20 mEq tablet Take 2 Tabs by mouth daily. Qty: 7 Tab, Refills: 0      pantoprazole (PROTONIX) 40 mg tablet Take 1 Tab by mouth Daily (before breakfast). Indications: while taking high dose ibuprofen for rib fractures  Qty: 30 Tab, Refills: 1      senna-docusate (PERICOLACE) 8.6-50 mg per tablet Take 1 Tab by mouth two (2) times a day. Qty: 60 Tab, Refills: -      LORazepam (ATIVAN) 1 mg tablet Take 1 Tab by mouth every six (6) hours as needed for Anxiety. Max Daily Amount: 4 mg. Qty: 20 Tab, Refills: 0             Follow Up Orders: Follow-up Appointments   Procedures    FOLLOW UP VISIT Appointment in: Other (Specify) PCP -- 1 week     PCP -- 1 week     Standing Status:   Standing     Number of Occurrences:   1     Order Specific Question:   Appointment in     Answer: Other (Specify)       Follow-up Information     Follow up With Specialties Details Why Contact Info    None    None ([phone]) Patient stated that they have no PCP            Time spent in patient discharge planning and coordination 35 minutes.     Signed:  Villa Langford MD

## 2019-03-18 NOTE — PROGRESS NOTES
Patient is calm  Visited previously to build relationship with patient  Encouraged her today  Will follow thru her admission    China Zeng, staff [address], [phone]   /   [email or web address]

## 2019-03-18 NOTE — PROGRESS NOTES
Bedside and Verbal shift change report given to self (oncoming nurse) by Varun Mccabe RN (offgoing nurse). Report included the following information SBAR, Kardex, MAR and Recent Results.

## 2019-03-22 LAB
BACTERIA SPEC CULT: NORMAL
BACTERIA SPEC CULT: NORMAL
SERVICE CMNT-IMP: NORMAL
SERVICE CMNT-IMP: NORMAL

## 2019-07-08 ENCOUNTER — HOSPITAL ENCOUNTER (EMERGENCY)
Age: 64
Discharge: HOME | End: 2019-07-08
Attending: EMERGENCY MEDICINE
Payer: MEDICARE

## 2019-07-08 VITALS
DIASTOLIC BLOOD PRESSURE: 78 MMHG | TEMPERATURE: 98.2 F | RESPIRATION RATE: 16 BRPM | BODY MASS INDEX: 22.5 KG/M2 | OXYGEN SATURATION: 100 % | SYSTOLIC BLOOD PRESSURE: 124 MMHG | WEIGHT: 140 LBS | HEART RATE: 107 BPM | HEIGHT: 66 IN

## 2019-07-08 DIAGNOSIS — F10.20 ALCOHOLISM (HCC): Primary | ICD-10-CM

## 2019-07-08 LAB
ALBUMIN SERPL-MCNC: 4 G/DL (ref 3.2–4.6)
ALBUMIN/GLOB SERPL: 1 {RATIO} (ref 1.2–3.5)
ALP SERPL-CCNC: 180 U/L (ref 50–136)
ALT SERPL-CCNC: 15 U/L (ref 12–65)
AMPHET UR QL SCN: NEGATIVE
ANION GAP SERPL CALC-SCNC: 11 MMOL/L (ref 7–16)
AST SERPL-CCNC: 24 U/L (ref 15–37)
BARBITURATES UR QL SCN: NEGATIVE
BASOPHILS # BLD: 0.1 K/UL (ref 0–0.2)
BASOPHILS NFR BLD: 0 % (ref 0–2)
BENZODIAZ UR QL: NEGATIVE
BILIRUB SERPL-MCNC: 0.6 MG/DL (ref 0.2–1.1)
BUN SERPL-MCNC: 22 MG/DL (ref 8–23)
CALCIUM SERPL-MCNC: 9.5 MG/DL (ref 8.3–10.4)
CANNABINOIDS UR QL SCN: NEGATIVE
CHLORIDE SERPL-SCNC: 99 MMOL/L (ref 98–107)
CO2 SERPL-SCNC: 27 MMOL/L (ref 21–32)
COCAINE UR QL SCN: NEGATIVE
CREAT SERPL-MCNC: 1.92 MG/DL (ref 0.6–1)
DIFFERENTIAL METHOD BLD: ABNORMAL
EOSINOPHIL # BLD: 0 K/UL (ref 0–0.8)
EOSINOPHIL NFR BLD: 0 % (ref 0.5–7.8)
ERYTHROCYTE [DISTWIDTH] IN BLOOD BY AUTOMATED COUNT: 13.7 % (ref 11.9–14.6)
ETHANOL SERPL-MCNC: <3 MG/DL
GLOBULIN SER CALC-MCNC: 4 G/DL (ref 2.3–3.5)
GLUCOSE SERPL-MCNC: 109 MG/DL (ref 65–100)
HCT VFR BLD AUTO: 49.3 % (ref 35.8–46.3)
HGB BLD-MCNC: 17.1 G/DL (ref 11.7–15.4)
IMM GRANULOCYTES # BLD AUTO: 0.1 K/UL (ref 0–0.5)
IMM GRANULOCYTES NFR BLD AUTO: 1 % (ref 0–5)
LIPASE SERPL-CCNC: 150 U/L (ref 73–393)
LYMPHOCYTES # BLD: 1.1 K/UL (ref 0.5–4.6)
LYMPHOCYTES NFR BLD: 9 % (ref 13–44)
MCH RBC QN AUTO: 30 PG (ref 26.1–32.9)
MCHC RBC AUTO-ENTMCNC: 34.7 G/DL (ref 31.4–35)
MCV RBC AUTO: 86.5 FL (ref 79.6–97.8)
METHADONE UR QL: NEGATIVE
MONOCYTES # BLD: 0.8 K/UL (ref 0.1–1.3)
MONOCYTES NFR BLD: 7 % (ref 4–12)
NEUTS SEG # BLD: 10 K/UL (ref 1.7–8.2)
NEUTS SEG NFR BLD: 83 % (ref 43–78)
NRBC # BLD: 0 K/UL (ref 0–0.2)
OPIATES UR QL: NEGATIVE
PCP UR QL: NEGATIVE
PLATELET # BLD AUTO: 501 K/UL (ref 150–450)
PMV BLD AUTO: 9.1 FL (ref 9.4–12.3)
POTASSIUM SERPL-SCNC: 3.9 MMOL/L (ref 3.5–5.1)
PROT SERPL-MCNC: 8 G/DL (ref 6.3–8.2)
RBC # BLD AUTO: 5.7 M/UL (ref 4.05–5.2)
SODIUM SERPL-SCNC: 137 MMOL/L (ref 136–145)
WBC # BLD AUTO: 12 K/UL (ref 4.3–11.1)

## 2019-07-08 PROCEDURE — 85025 COMPLETE CBC W/AUTO DIFF WBC: CPT

## 2019-07-08 PROCEDURE — 99285 EMERGENCY DEPT VISIT HI MDM: CPT | Performed by: EMERGENCY MEDICINE

## 2019-07-08 PROCEDURE — 83690 ASSAY OF LIPASE: CPT

## 2019-07-08 PROCEDURE — 74011250636 HC RX REV CODE- 250/636: Performed by: EMERGENCY MEDICINE

## 2019-07-08 PROCEDURE — 80307 DRUG TEST PRSMV CHEM ANLYZR: CPT

## 2019-07-08 PROCEDURE — 81003 URINALYSIS AUTO W/O SCOPE: CPT | Performed by: EMERGENCY MEDICINE

## 2019-07-08 PROCEDURE — 80053 COMPREHEN METABOLIC PANEL: CPT

## 2019-07-08 PROCEDURE — 96365 THER/PROPH/DIAG IV INF INIT: CPT | Performed by: EMERGENCY MEDICINE

## 2019-07-08 PROCEDURE — 74011000250 HC RX REV CODE- 250: Performed by: EMERGENCY MEDICINE

## 2019-07-08 RX ADMIN — THIAMINE HYDROCHLORIDE: 100 INJECTION, SOLUTION INTRAMUSCULAR; INTRAVENOUS at 12:14

## 2019-07-08 RX ADMIN — SODIUM CHLORIDE 1000 ML: 900 INJECTION, SOLUTION INTRAVENOUS at 11:54

## 2019-07-08 NOTE — ED NOTES
Pt sleeping on stretcher. No signs of distress. Breathing even and unlabored. Sitter at bedside. Safety precautions in place.

## 2019-07-08 NOTE — ED TRIAGE NOTES
Patient arrives via EMS from [facility]. States patient claims SI x4 days. Mode by alcohol. Has had \"a couple of gallons\" of vodka over past 4 days, per patient. Patient alert and oriented in triage. VSS.

## 2019-07-08 NOTE — ED NOTES
Pt to go to [facility] on [address] at [facility] (07/08/19). Charge nurse and Dr. Иван Garcia informed.

## 2019-07-08 NOTE — ED PROVIDER NOTES
72-year-old female presents by EMS with complaints of alcohol intoxication  Reports to me that she wishes to detox and stop drinking  She estimates she's been a heavy drinker for the last 40 years. She denies suicidal or homicidal ideations at present    She states that she ran out of food and money and that is what prompted her to call EMS and seek help for her chronic alcoholism. The history is provided by the patient and the EMS personnel. Suicidal   Pertinent negatives include no shortness of breath, no chest pain, no vomiting, no headaches, no bowel incontinence and no bladder incontinence. Alcohol intoxication   Primary symptoms include: intoxication. There areno somnolence, no weakness and no self-injury present at this time. This is a chronic problem. The current episode started more than 1 week ago. The problem has been gradually worsening. Suspected agents include alcohol. Pertinent negatives include no fever, no vomiting, no bladder incontinence and no bowel incontinence. Associated medical issues include addiction treatment. Associated medical issues do not include suicidal ideas. Past Medical History:   Diagnosis Date    Alcohol abuse 10/31/2015    Alcohol withdrawal ([address] 10/31/2015    Asthma     Other ill-defined conditions(799.89)     lumbar and thoracic scoliosis    Other ill-defined conditions(799.89)     irregular heart rate    Psychiatric disorder     ADHD    Seizures ([address]        Past Surgical History:   Procedure Laterality Date    COLONOSCOPY N/A 1/2/2019    COLONOSCOPY  BMI 20/ROOM 607 performed by Pattie Mcrae MD at [address] [facility] HX GYN      hysterectomy    HX HEENT      sinus surgery. deviated septum.  HX ORTHOPAEDIC      right knee         No family history on file.     Social History     Socioeconomic History    Marital status:      Spouse name: Not on file    Number of children: Not on file    Years of education: Not on file    Highest education level: Not on file   Occupational History    Not on file   Social Needs    Financial resource strain: Not on file    Food insecurity:     Worry: Not on file     Inability: Not on file    Transportation needs:     Medical: Not on file     Non-medical: Not on file   Tobacco Use    Smoking status: Current Every Day Smoker     Packs/day: 0.25    Smokeless tobacco: Never Used   Substance and Sexual Activity    Alcohol use: No     Alcohol/week: 0.0 oz    Drug use: No    Sexual activity: Not on file   Lifestyle    Physical activity:     Days per week: Not on file     Minutes per session: Not on file    Stress: Not on file   Relationships    Social connections:     Talks on phone: Not on file     Gets together: Not on file     Attends Zoroastrianism service: Not on file     Active member of club or organization: Not on file     Attends meetings of clubs or organizations: Not on file     Relationship status: Not on file    Intimate partner violence:     Fear of current or ex partner: Not on file     Emotionally abused: Not on file     Physically abused: Not on file     Forced sexual activity: Not on file   Other Topics Concern    Not on file   Social History Narrative    Not on file         ALLERGIES: Patient has no known allergies. Review of Systems   Constitutional: Negative for chills and fever. HENT: Negative for congestion, ear pain and rhinorrhea. Eyes: Negative for photophobia and discharge. Respiratory: Negative for cough and shortness of breath. Cardiovascular: Negative for chest pain and palpitations. Gastrointestinal: Negative for abdominal pain, bowel incontinence, constipation, diarrhea and vomiting. Endocrine: Negative for cold intolerance and heat intolerance. Genitourinary: Negative for bladder incontinence, dysuria and flank pain. Musculoskeletal: Negative for arthralgias, myalgias and neck pain. Skin: Negative for rash and wound.    Allergic/Immunologic: Negative for environmental allergies and food allergies. Neurological: Negative for syncope, weakness and headaches. Hematological: Negative for adenopathy. Does not bruise/bleed easily. Psychiatric/Behavioral: Positive for dysphoric mood. Negative for self-injury and suicidal ideas. The patient is not nervous/anxious. All other systems reviewed and are negative. Vitals:    07/08/19 1118   BP: 133/78   Pulse: (!) 134   Resp: 18   Temp: 98.2 °F (36.8 °C)   SpO2: 99%   Weight: 63.5 kg (140 lb)   Height: 5' 6\" (1.676 m)            Physical Exam   Constitutional: She is oriented to person, place, and time. She appears well-developed and well-nourished. She appears distressed. HENT:   Head: Normocephalic and atraumatic. Right Ear: External ear normal.   Left Ear: External ear normal.   Mouth/Throat: Oropharynx is clear and moist. No oropharyngeal exudate. Eyes: Pupils are equal, round, and reactive to light. Conjunctivae and EOM are normal.   Neck: Normal range of motion. Neck supple. No JVD present. Cardiovascular: Normal rate, regular rhythm, normal heart sounds and intact distal pulses. Exam reveals no gallop and no friction rub. No murmur heard. Pulmonary/Chest: Effort normal and breath sounds normal.   Abdominal: Soft. Normal appearance and bowel sounds are normal. She exhibits no distension and no mass. There is no hepatosplenomegaly. There is no tenderness. Musculoskeletal: Normal range of motion. She exhibits no edema or deformity. Neurological: She is alert and oriented to person, place, and time. She has normal strength. No cranial nerve deficit or sensory deficit. She displays a negative Romberg sign. Gait normal.   Skin: Skin is warm and dry. Capillary refill takes less than 2 seconds. No rash noted. Psychiatric: Her speech is normal and behavior is normal. Thought content normal. Her affect is blunt. Cognition and memory are normal. She expresses impulsivity.    Nursing note and vitals reviewed. MDM  Number of Diagnoses or Management Options  Alcoholism ([facility]): established and worsening  Diagnosis management comments: 62-year-old female with chronic alcohol abuse. No suicidal or homicidal ideations today    Will complete medical workup. FAVOR consulted    No evidence of withdrawal symptoms currently. 4:55 PM  Alcohol and UDS are negative  Awaiting response from the detox center    If patient has a bed available tomorrow, we will hold the patient overnight and discharged. Patient directly to the [facility] in the morning       Amount and/or Complexity of Data Reviewed  Clinical lab tests: ordered and reviewed  Review and summarize past medical records: yes    Risk of Complications, Morbidity, and/or Mortality  Presenting problems: moderate  Diagnostic procedures: moderate  Management options: low  General comments: Elements of this note have been dictated via voice recognition software. Text and phrases may be limited by the accuracy of the software. The chart has been reviewed, but errors may still be present.       Patient Progress  Patient progress: stable         Procedures

## 2019-07-09 ENCOUNTER — HOSPITAL ENCOUNTER (EMERGENCY)
Age: 64
Discharge: HOME | End: 2019-07-09
Attending: EMERGENCY MEDICINE
Payer: MEDICARE

## 2019-07-09 PROCEDURE — 75810000275 HC EMERGENCY DEPT VISIT NO LEVEL OF CARE: Performed by: EMERGENCY MEDICINE

## 2019-07-09 NOTE — ED NOTES
Patient awake, A&O x4. Patient denies SI/HI at this time. Denies auditory or visual hallucinations. Patient contracts for safety. Sitter at bedside.

## 2019-07-09 NOTE — ED NOTES
I have reviewed discharge instructions with the patient. The patient verbalized understanding. Patient left ED via Discharge Method: ambulatory to [facility] with self via cab    Opportunity for questions and clarification provided. Patient given 0 scripts. To continue your aftercare when you leave the hospital, you may receive an automated call from our care team to check in on how you are doing. This is a free service and part of our promise to provide the best care and service to meet your aftercare needs.  If you have questions, or wish to unsubscribe from this service please call [phone]. Thank you for Choosing our [facility].

## 2019-07-09 NOTE — ED TRIAGE NOTES
EMS called for left ankle pain. Sam Maria Luisa off of cabinet 2 weeks ago, increased pain last 3 days. Checked out of detox to come here.   VSS

## 2019-07-11 ENCOUNTER — APPOINTMENT (OUTPATIENT)
Dept: MRI IMAGING | Age: 64
DRG: 683 | End: 2019-07-11
Attending: HOSPITALIST
Payer: MEDICARE

## 2019-07-11 ENCOUNTER — HOSPITAL ENCOUNTER (OUTPATIENT)
Age: 64
Setting detail: OBSERVATION
Discharge: HOME | DRG: 683 | End: 2019-07-12
Attending: EMERGENCY MEDICINE | Admitting: HOSPITALIST
Payer: MEDICARE

## 2019-07-11 ENCOUNTER — APPOINTMENT (OUTPATIENT)
Dept: GENERAL RADIOLOGY | Age: 64
DRG: 683 | End: 2019-07-11
Attending: EMERGENCY MEDICINE
Payer: MEDICARE

## 2019-07-11 ENCOUNTER — APPOINTMENT (OUTPATIENT)
Dept: CT IMAGING | Age: 64
DRG: 683 | End: 2019-07-11
Attending: EMERGENCY MEDICINE
Payer: MEDICARE

## 2019-07-11 DIAGNOSIS — R65.10 SIRS (SYSTEMIC INFLAMMATORY RESPONSE SYNDROME) (HCC): ICD-10-CM

## 2019-07-11 DIAGNOSIS — F10.10 ALCOHOL ABUSE: ICD-10-CM

## 2019-07-11 DIAGNOSIS — F10.931 DELIRIUM TREMENS (HCC): Primary | ICD-10-CM

## 2019-07-11 DIAGNOSIS — R20.2 LEFT HAND PARESTHESIA: ICD-10-CM

## 2019-07-11 PROBLEM — N17.9 AKI (ACUTE KIDNEY INJURY) (HCC): Status: ACTIVE | Noted: 2019-07-11

## 2019-07-11 LAB
ALBUMIN SERPL-MCNC: 3.8 G/DL (ref 3.2–4.6)
ALBUMIN/GLOB SERPL: 1 {RATIO} (ref 1.2–3.5)
ALP SERPL-CCNC: 159 U/L (ref 50–136)
ALT SERPL-CCNC: 18 U/L (ref 12–65)
AMORPH CRY URNS QL MICRO: NORMAL
AMPHET UR QL SCN: NEGATIVE
ANION GAP SERPL CALC-SCNC: 11 MMOL/L (ref 7–16)
APPEARANCE UR: CLEAR
AST SERPL-CCNC: 93 U/L (ref 15–37)
ATRIAL RATE: 104 BPM
BACTERIA URNS QL MICRO: 0 /HPF
BACTERIA URNS QL MICRO: 0 /HPF
BARBITURATES UR QL SCN: NEGATIVE
BASOPHILS # BLD: 0.1 K/UL (ref 0–0.2)
BASOPHILS NFR BLD: 0 % (ref 0–2)
BENZODIAZ UR QL: NEGATIVE
BILIRUB SERPL-MCNC: 0.3 MG/DL (ref 0.2–1.1)
BILIRUB UR QL: NEGATIVE
BUN SERPL-MCNC: 28 MG/DL (ref 8–23)
CALCIUM SERPL-MCNC: 9.1 MG/DL (ref 8.3–10.4)
CALCULATED P AXIS, ECG09: 78 DEGREES
CALCULATED R AXIS, ECG10: 77 DEGREES
CALCULATED T AXIS, ECG11: 54 DEGREES
CANNABINOIDS UR QL SCN: NEGATIVE
CASTS URNS QL MICRO: NORMAL /LPF
CASTS URNS QL MICRO: NORMAL /LPF
CHLORIDE SERPL-SCNC: 98 MMOL/L (ref 98–107)
CK SERPL-CCNC: 1634 U/L (ref 21–215)
CK SERPL-CCNC: 2267 U/L (ref 21–215)
CO2 SERPL-SCNC: 27 MMOL/L (ref 21–32)
COCAINE UR QL SCN: NEGATIVE
COLOR UR: YELLOW
CREAT SERPL-MCNC: 2.02 MG/DL (ref 0.6–1)
CRYSTALS URNS QL MICRO: 0 /LPF
CRYSTALS URNS QL MICRO: 0 /LPF
DIAGNOSIS, 93000: NORMAL
DIFFERENTIAL METHOD BLD: ABNORMAL
EOSINOPHIL # BLD: 0 K/UL (ref 0–0.8)
EOSINOPHIL NFR BLD: 0 % (ref 0.5–7.8)
EPI CELLS #/AREA URNS HPF: 0 /HPF
EPI CELLS #/AREA URNS HPF: 0 /HPF
ERYTHROCYTE [DISTWIDTH] IN BLOOD BY AUTOMATED COUNT: 13.7 % (ref 11.9–14.6)
ETHANOL SERPL-MCNC: <3 MG/DL
ETHANOL SERPL-MCNC: <3 MG/DL
GLOBULIN SER CALC-MCNC: 3.7 G/DL (ref 2.3–3.5)
GLUCOSE BLD STRIP.AUTO-MCNC: 94 MG/DL (ref 65–100)
GLUCOSE SERPL-MCNC: 106 MG/DL (ref 65–100)
GLUCOSE UR STRIP.AUTO-MCNC: NEGATIVE MG/DL
HCT VFR BLD AUTO: 45.4 % (ref 35.8–46.3)
HGB BLD-MCNC: 15.7 G/DL (ref 11.7–15.4)
HGB UR QL STRIP: ABNORMAL
IMM GRANULOCYTES # BLD AUTO: 0.1 K/UL (ref 0–0.5)
IMM GRANULOCYTES NFR BLD AUTO: 1 % (ref 0–5)
INR BLD: 1.1 (ref 0.9–1.2)
KETONES UR QL STRIP.AUTO: NEGATIVE MG/DL
LACTATE BLD-SCNC: 1.26 MMOL/L (ref 0.5–1.9)
LEUKOCYTE ESTERASE UR QL STRIP.AUTO: NEGATIVE
LIPASE SERPL-CCNC: 180 U/L (ref 73–393)
LYMPHOCYTES # BLD: 0.9 K/UL (ref 0.5–4.6)
LYMPHOCYTES NFR BLD: 4 % (ref 13–44)
MAGNESIUM SERPL-MCNC: 2.7 MG/DL (ref 1.8–2.4)
MCH RBC QN AUTO: 29.8 PG (ref 26.1–32.9)
MCHC RBC AUTO-ENTMCNC: 34.6 G/DL (ref 31.4–35)
MCV RBC AUTO: 86.1 FL (ref 79.6–97.8)
METHADONE UR QL: NEGATIVE
MONOCYTES # BLD: 1.6 K/UL (ref 0.1–1.3)
MONOCYTES NFR BLD: 7 % (ref 4–12)
MUCOUS THREADS URNS QL MICRO: 0 /LPF
MUCOUS THREADS URNS QL MICRO: 0 /LPF
NEUTS SEG # BLD: 19.6 K/UL (ref 1.7–8.2)
NEUTS SEG NFR BLD: 88 % (ref 43–78)
NITRITE UR QL STRIP.AUTO: NEGATIVE
NRBC # BLD: 0 K/UL (ref 0–0.2)
OPIATES UR QL: NEGATIVE
P-R INTERVAL, ECG05: 134 MS
PCP UR QL: NEGATIVE
PH UR STRIP: 6 [PH] (ref 5–9)
PHOSPHATE SERPL-MCNC: 3.8 MG/DL (ref 2.3–3.7)
PLATELET # BLD AUTO: 456 K/UL (ref 150–450)
PMV BLD AUTO: 9.3 FL (ref 9.4–12.3)
POTASSIUM SERPL-SCNC: 3.1 MMOL/L (ref 3.5–5.1)
POTASSIUM SERPL-SCNC: 3.1 MMOL/L (ref 3.5–5.1)
PROCALCITONIN SERPL-MCNC: 0.1 NG/ML
PROT SERPL-MCNC: 7.5 G/DL (ref 6.3–8.2)
PROT UR STRIP-MCNC: NEGATIVE MG/DL
PT BLD: 13.5 SECS (ref 9.6–11.6)
Q-T INTERVAL, ECG07: 348 MS
QRS DURATION, ECG06: 88 MS
QTC CALCULATION (BEZET), ECG08: 457 MS
RBC # BLD AUTO: 5.27 M/UL (ref 4.05–5.2)
RBC #/AREA URNS HPF: 0 /HPF
RBC #/AREA URNS HPF: NORMAL /HPF
SODIUM SERPL-SCNC: 136 MMOL/L (ref 136–145)
SP GR UR REFRACTOMETRY: 1.01 (ref 1–1.02)
UROBILINOGEN UR QL STRIP.AUTO: 0.2 EU/DL (ref 0.2–1)
VENTRICULAR RATE, ECG03: 104 BPM
WBC # BLD AUTO: 22.3 K/UL (ref 4.3–11.1)
WBC URNS QL MICRO: 0 /HPF
WBC URNS QL MICRO: 0 /HPF

## 2019-07-11 PROCEDURE — 77030011943

## 2019-07-11 PROCEDURE — 65660000000 HC RM CCU STEPDOWN

## 2019-07-11 PROCEDURE — 82550 ASSAY OF CK (CPK): CPT

## 2019-07-11 PROCEDURE — 74011250636 HC RX REV CODE- 250/636: Performed by: HOSPITALIST

## 2019-07-11 PROCEDURE — 96375 TX/PRO/DX INJ NEW DRUG ADDON: CPT

## 2019-07-11 PROCEDURE — 99218 HC RM OBSERVATION: CPT

## 2019-07-11 PROCEDURE — 84145 PROCALCITONIN (PCT): CPT

## 2019-07-11 PROCEDURE — 84100 ASSAY OF PHOSPHORUS: CPT

## 2019-07-11 PROCEDURE — 85025 COMPLETE CBC W/AUTO DIFF WBC: CPT

## 2019-07-11 PROCEDURE — 84132 ASSAY OF SERUM POTASSIUM: CPT

## 2019-07-11 PROCEDURE — 82962 GLUCOSE BLOOD TEST: CPT

## 2019-07-11 PROCEDURE — 36415 COLL VENOUS BLD VENIPUNCTURE: CPT

## 2019-07-11 PROCEDURE — 74011250636 HC RX REV CODE- 250/636: Performed by: EMERGENCY MEDICINE

## 2019-07-11 PROCEDURE — 96361 HYDRATE IV INFUSION ADD-ON: CPT

## 2019-07-11 PROCEDURE — 87086 URINE CULTURE/COLONY COUNT: CPT

## 2019-07-11 PROCEDURE — 99285 EMERGENCY DEPT VISIT HI MDM: CPT | Performed by: EMERGENCY MEDICINE

## 2019-07-11 PROCEDURE — 96367 TX/PROPH/DG ADDL SEQ IV INF: CPT

## 2019-07-11 PROCEDURE — 81001 URINALYSIS AUTO W/SCOPE: CPT

## 2019-07-11 PROCEDURE — 83735 ASSAY OF MAGNESIUM: CPT

## 2019-07-11 PROCEDURE — 81015 MICROSCOPIC EXAM OF URINE: CPT

## 2019-07-11 PROCEDURE — 96366 THER/PROPH/DIAG IV INF ADDON: CPT

## 2019-07-11 PROCEDURE — 85610 PROTHROMBIN TIME: CPT

## 2019-07-11 PROCEDURE — 80307 DRUG TEST PRSMV CHEM ANLYZR: CPT

## 2019-07-11 PROCEDURE — 80053 COMPREHEN METABOLIC PANEL: CPT

## 2019-07-11 PROCEDURE — 74011000258 HC RX REV CODE- 258: Performed by: EMERGENCY MEDICINE

## 2019-07-11 PROCEDURE — 81003 URINALYSIS AUTO W/O SCOPE: CPT

## 2019-07-11 PROCEDURE — 51701 INSERT BLADDER CATHETER: CPT | Performed by: EMERGENCY MEDICINE

## 2019-07-11 PROCEDURE — 74011000250 HC RX REV CODE- 250: Performed by: HOSPITALIST

## 2019-07-11 PROCEDURE — 99222 1ST HOSP IP/OBS MODERATE 55: CPT | Performed by: PSYCHIATRY & NEUROLOGY

## 2019-07-11 PROCEDURE — 83605 ASSAY OF LACTIC ACID: CPT

## 2019-07-11 PROCEDURE — 74011250637 HC RX REV CODE- 250/637: Performed by: HOSPITALIST

## 2019-07-11 PROCEDURE — 96368 THER/DIAG CONCURRENT INF: CPT

## 2019-07-11 PROCEDURE — 83690 ASSAY OF LIPASE: CPT

## 2019-07-11 PROCEDURE — 77030032490 HC SLV COMPR SCD KNE COVD -B

## 2019-07-11 PROCEDURE — 70450 CT HEAD/BRAIN W/O DYE: CPT

## 2019-07-11 PROCEDURE — 93005 ELECTROCARDIOGRAM TRACING: CPT | Performed by: HOSPITALIST

## 2019-07-11 PROCEDURE — 71045 X-RAY EXAM CHEST 1 VIEW: CPT

## 2019-07-11 PROCEDURE — 70551 MRI BRAIN STEM W/O DYE: CPT

## 2019-07-11 PROCEDURE — 87040 BLOOD CULTURE FOR BACTERIA: CPT

## 2019-07-11 PROCEDURE — 96365 THER/PROPH/DIAG IV INF INIT: CPT

## 2019-07-11 RX ORDER — SODIUM CHLORIDE 0.9 % (FLUSH) 0.9 %
5-40 SYRINGE (ML) INJECTION EVERY 8 HOURS
Status: DISCONTINUED | OUTPATIENT
Start: 2019-07-11 | End: 2019-07-12 | Stop reason: HOSPADM

## 2019-07-11 RX ORDER — PANTOPRAZOLE SODIUM 40 MG/1
40 TABLET, DELAYED RELEASE ORAL
Status: DISCONTINUED | OUTPATIENT
Start: 2019-07-12 | End: 2019-07-12 | Stop reason: HOSPADM

## 2019-07-11 RX ORDER — ACETAMINOPHEN 325 MG/1
650 TABLET ORAL
Status: DISCONTINUED | OUTPATIENT
Start: 2019-07-11 | End: 2019-07-12 | Stop reason: HOSPADM

## 2019-07-11 RX ORDER — LORAZEPAM 2 MG/ML
1 INJECTION INTRAMUSCULAR
Status: COMPLETED | OUTPATIENT
Start: 2019-07-11 | End: 2019-07-11

## 2019-07-11 RX ORDER — LORAZEPAM 2 MG/ML
1 INJECTION INTRAMUSCULAR
Status: ACTIVE | OUTPATIENT
Start: 2019-07-11 | End: 2019-07-11

## 2019-07-11 RX ORDER — GABAPENTIN 300 MG/1
300 CAPSULE ORAL
Status: DISCONTINUED | OUTPATIENT
Start: 2019-07-11 | End: 2019-07-11

## 2019-07-11 RX ORDER — SODIUM CHLORIDE 0.9 % (FLUSH) 0.9 %
5-40 SYRINGE (ML) INJECTION EVERY 8 HOURS
Status: DISCONTINUED | OUTPATIENT
Start: 2019-07-11 | End: 2019-07-11 | Stop reason: SDUPTHER

## 2019-07-11 RX ORDER — GUAIFENESIN 100 MG/5ML
81 LIQUID (ML) ORAL DAILY
Status: DISCONTINUED | OUTPATIENT
Start: 2019-07-12 | End: 2019-07-11 | Stop reason: SDUPTHER

## 2019-07-11 RX ORDER — LORAZEPAM 1 MG/1
2 TABLET ORAL
Status: DISCONTINUED | OUTPATIENT
Start: 2019-07-11 | End: 2019-07-12 | Stop reason: HOSPADM

## 2019-07-11 RX ORDER — GUAIFENESIN 100 MG/5ML
81 LIQUID (ML) ORAL DAILY
Status: DISCONTINUED | OUTPATIENT
Start: 2019-07-11 | End: 2019-07-12 | Stop reason: HOSPADM

## 2019-07-11 RX ORDER — LORAZEPAM 1 MG/1
1 TABLET ORAL
Status: DISCONTINUED | OUTPATIENT
Start: 2019-07-11 | End: 2019-07-12 | Stop reason: HOSPADM

## 2019-07-11 RX ORDER — LORAZEPAM 1 MG/1
1 TABLET ORAL
Status: DISCONTINUED | OUTPATIENT
Start: 2019-07-11 | End: 2019-07-11

## 2019-07-11 RX ORDER — LEVOFLOXACIN 5 MG/ML
750 INJECTION, SOLUTION INTRAVENOUS
Status: DISCONTINUED | OUTPATIENT
Start: 2019-07-11 | End: 2019-07-12 | Stop reason: HOSPADM

## 2019-07-11 RX ORDER — SODIUM CHLORIDE 0.9 % (FLUSH) 0.9 %
5-40 SYRINGE (ML) INJECTION AS NEEDED
Status: DISCONTINUED | OUTPATIENT
Start: 2019-07-11 | End: 2019-07-12 | Stop reason: HOSPADM

## 2019-07-11 RX ORDER — NALOXONE HYDROCHLORIDE 0.4 MG/ML
0.4 INJECTION, SOLUTION INTRAMUSCULAR; INTRAVENOUS; SUBCUTANEOUS AS NEEDED
Status: DISCONTINUED | OUTPATIENT
Start: 2019-07-11 | End: 2019-07-12 | Stop reason: HOSPADM

## 2019-07-11 RX ORDER — FOLIC ACID 1 MG/1
1 TABLET ORAL DAILY
Status: DISCONTINUED | OUTPATIENT
Start: 2019-07-12 | End: 2019-07-12 | Stop reason: HOSPADM

## 2019-07-11 RX ORDER — SODIUM CHLORIDE 9 MG/ML
1000 INJECTION, SOLUTION INTRAVENOUS ONCE
Status: COMPLETED | OUTPATIENT
Start: 2019-07-11 | End: 2019-07-11

## 2019-07-11 RX ORDER — TRAMADOL HYDROCHLORIDE 50 MG/1
50 TABLET ORAL
Status: DISCONTINUED | OUTPATIENT
Start: 2019-07-11 | End: 2019-07-12 | Stop reason: HOSPADM

## 2019-07-11 RX ORDER — LANOLIN ALCOHOL/MO/W.PET/CERES
100 CREAM (GRAM) TOPICAL DAILY
Status: DISCONTINUED | OUTPATIENT
Start: 2019-07-12 | End: 2019-07-12

## 2019-07-11 RX ORDER — DIPHENHYDRAMINE HCL 25 MG
25 CAPSULE ORAL
Status: DISCONTINUED | OUTPATIENT
Start: 2019-07-11 | End: 2019-07-12 | Stop reason: HOSPADM

## 2019-07-11 RX ORDER — CHLORDIAZEPOXIDE HYDROCHLORIDE 25 MG/1
25 CAPSULE, GELATIN COATED ORAL 4 TIMES DAILY
Status: DISCONTINUED | OUTPATIENT
Start: 2019-07-11 | End: 2019-07-12 | Stop reason: HOSPADM

## 2019-07-11 RX ORDER — IBUPROFEN 200 MG
1 TABLET ORAL EVERY 24 HOURS
Status: DISCONTINUED | OUTPATIENT
Start: 2019-07-11 | End: 2019-07-12 | Stop reason: HOSPADM

## 2019-07-11 RX ORDER — ADHESIVE BANDAGE
30 BANDAGE TOPICAL DAILY PRN
Status: DISCONTINUED | OUTPATIENT
Start: 2019-07-11 | End: 2019-07-12 | Stop reason: HOSPADM

## 2019-07-11 RX ORDER — ATORVASTATIN CALCIUM 10 MG/1
20 TABLET, FILM COATED ORAL
Status: DISCONTINUED | OUTPATIENT
Start: 2019-07-11 | End: 2019-07-11

## 2019-07-11 RX ORDER — ONDANSETRON 2 MG/ML
4 INJECTION INTRAMUSCULAR; INTRAVENOUS
Status: DISCONTINUED | OUTPATIENT
Start: 2019-07-11 | End: 2019-07-12 | Stop reason: HOSPADM

## 2019-07-11 RX ORDER — SODIUM CHLORIDE 0.9 % (FLUSH) 0.9 %
5-40 SYRINGE (ML) INJECTION AS NEEDED
Status: DISCONTINUED | OUTPATIENT
Start: 2019-07-11 | End: 2019-07-11 | Stop reason: SDUPTHER

## 2019-07-11 RX ORDER — SODIUM CHLORIDE AND POTASSIUM CHLORIDE .9; .15 G/100ML; G/100ML
SOLUTION INTRAVENOUS CONTINUOUS
Status: DISCONTINUED | OUTPATIENT
Start: 2019-07-11 | End: 2019-07-12 | Stop reason: HOSPADM

## 2019-07-11 RX ORDER — LEVOFLOXACIN 5 MG/ML
750 INJECTION, SOLUTION INTRAVENOUS EVERY 24 HOURS
Status: DISCONTINUED | OUTPATIENT
Start: 2019-07-11 | End: 2019-07-11

## 2019-07-11 RX ORDER — POTASSIUM CHLORIDE 20 MEQ/1
40 TABLET, EXTENDED RELEASE ORAL
Status: DISCONTINUED | OUTPATIENT
Start: 2019-07-11 | End: 2019-07-11

## 2019-07-11 RX ADMIN — ASPIRIN 81 MG 81 MG: 81 TABLET ORAL at 15:45

## 2019-07-11 RX ADMIN — CHLORDIAZEPOXIDE HYDROCHLORIDE 25 MG: 25 CAPSULE ORAL at 13:29

## 2019-07-11 RX ADMIN — LEVOFLOXACIN 750 MG: 5 INJECTION, SOLUTION INTRAVENOUS at 20:21

## 2019-07-11 RX ADMIN — SODIUM CHLORIDE 1000 ML: 900 INJECTION, SOLUTION INTRAVENOUS at 13:28

## 2019-07-11 RX ADMIN — SODIUM CHLORIDE AND POTASSIUM CHLORIDE: .9; .15 SOLUTION INTRAVENOUS at 13:40

## 2019-07-11 RX ADMIN — ACETAMINOPHEN 650 MG: 325 TABLET, FILM COATED ORAL at 17:47

## 2019-07-11 RX ADMIN — CHLORDIAZEPOXIDE HYDROCHLORIDE 25 MG: 25 CAPSULE ORAL at 21:59

## 2019-07-11 RX ADMIN — Medication 5 ML: at 22:29

## 2019-07-11 RX ADMIN — LORAZEPAM 1 MG: 1 TABLET ORAL at 21:59

## 2019-07-11 RX ADMIN — LORAZEPAM 1 MG: 2 INJECTION INTRAMUSCULAR; INTRAVENOUS at 13:28

## 2019-07-11 RX ADMIN — CHLORDIAZEPOXIDE HYDROCHLORIDE 25 MG: 25 CAPSULE ORAL at 17:47

## 2019-07-11 RX ADMIN — THIAMINE HYDROCHLORIDE: 100 INJECTION, SOLUTION INTRAMUSCULAR; INTRAVENOUS at 17:36

## 2019-07-11 RX ADMIN — Medication 10 ML: at 15:47

## 2019-07-11 RX ADMIN — CEFTRIAXONE 1 G: 1 INJECTION, POWDER, FOR SOLUTION INTRAMUSCULAR; INTRAVENOUS at 13:28

## 2019-07-11 RX ADMIN — LORAZEPAM 1 MG: 1 TABLET ORAL at 17:47

## 2019-07-11 NOTE — ED NOTES
Verbal report given to Lady Martinez, RN on tele, pt will go up on monitor with this nurse extern and primary RN. Care transferred at this time.

## 2019-07-11 NOTE — ED PROVIDER NOTES
Patient reports she is here because she has been binge drinking and not eating for the last several days. Last alcohol consumption is uncertain by her report. She then complains of left arm and left lower leg numbness and weakness onset in the lobby just prior to being moved to room 17. Headache developed in the lobby as well. Patient unable to describe it due to alcohol withdrawal versus intoxication. The history is provided by the patient. Past Medical History:   Diagnosis Date    Alcohol abuse 10/31/2015    Alcohol withdrawal ([facility] 10/31/2015    Asthma     Other ill-defined conditions(799.89)     lumbar and thoracic scoliosis    Other ill-defined conditions(799.89)     irregular heart rate    Psychiatric disorder     ADHD    Seizures ([facility]        Past Surgical History:   Procedure Laterality Date    COLONOSCOPY N/A 1/2/2019    COLONOSCOPY  BMI 20/ROOM 607 performed by Maykel Alvarado MD at [address] [facility] HX GYN      hysterectomy    HX HEENT      sinus surgery. deviated septum.  HX ORTHOPAEDIC      right knee         History reviewed. No pertinent family history.     Social History     Socioeconomic History    Marital status:      Spouse name: Not on file    Number of children: Not on file    Years of education: Not on file    Highest education level: Not on file   Occupational History    Not on file   Social Needs    Financial resource strain: Not on file    Food insecurity:     Worry: Not on file     Inability: Not on file    Transportation needs:     Medical: Not on file     Non-medical: Not on file   Tobacco Use    Smoking status: Current Every Day Smoker     Packs/day: 0.25    Smokeless tobacco: Never Used   Substance and Sexual Activity    Alcohol use: No     Alcohol/week: 0.0 oz    Drug use: No    Sexual activity: Not on file   Lifestyle    Physical activity:     Days per week: Not on file     Minutes per session: Not on file    Stress: Not on file Relationships    Social connections:     Talks on phone: Not on file     Gets together: Not on file     Attends Hindu service: Not on file     Active member of club or organization: Not on file     Attends meetings of clubs or organizations: Not on file     Relationship status: Not on file    Intimate partner violence:     Fear of current or ex partner: Not on file     Emotionally abused: Not on file     Physically abused: Not on file     Forced sexual activity: Not on file   Other Topics Concern    Not on file   Social History Narrative    Not on file         ALLERGIES: Patient has no known allergies. Review of Systems   Unable to perform ROS: Other       Vitals:    07/11/19 0831   BP: (!) 132/93   Pulse: (!) 120   Resp: 16   Temp: 97.9 °F (36.6 °C)   SpO2: 97%   Weight: 54.4 kg (120 lb)   Height: 5' 6\" (1.676 m)            Physical Exam   Constitutional:   Covered in dirt and dust and disheveled   HENT:   Mouth/Throat: Oropharynx is clear and moist.   Eyes: Pupils are equal, round, and reactive to light. Conjunctivae are normal.   Neck: Normal range of motion. Cardiovascular: Normal heart sounds and normal pulses. Tachycardia present. Pulmonary/Chest: Effort normal and breath sounds normal.   Abdominal: Soft. She exhibits no distension. There is no tenderness. Lymphadenopathy:     She has no cervical adenopathy. Neurological: She is alert. Decreased sensation to pain in left arm and left leg. In left leg it is normal above the knee. In left arm it is normal above the elbow. Decreased  left side. No true pronator drift is present as both arms continually go up and down. Left leg can be raised off the bed but drifts down nearly immediately. Patient states it is too weak to elevate. Cerebellar exam abnormal bilaterally. patient not able to cooperate with visual fields. Nursing note and vitals reviewed.        MDM  Number of Diagnoses or Management Options  Diagnosis management comments: Stroke alert called and stroke team is here. I believe it would be difficult to consent to this patient for TPA administration. It is unclear if she is suffering from alcohol withdrawal or alcohol intoxication at this point. Blood sugar to be checked. 10:54 AM  No TPA per neurology for the same reasons I was concerned for as outlined above. Patient's mental status not appropriate for consent for TPA or even evaluation of metal in her body for MRI. No one else available for consent. Patient is currently homeless.  CT head is negative for acute abnormality       Amount and/or Complexity of Data Reviewed  Clinical lab tests: ordered and reviewed  Tests in the radiology section of CPT®: ordered and reviewed           Procedures

## 2019-07-11 NOTE — PROGRESS NOTES
Verbal bedside report given to [facility], oncoming RN. Patient's situation, background, assessment and recommendations provided. Kardex, Mar, and recent results also reviewed. Opportunity for questions provided. Oncoming RN assumed care of patient.

## 2019-07-11 NOTE — H&P
H&P      Patient: Elva Stevenson               Sex: female             MRN: [id number]      YOB: 1955      Age:  62 y.o. Chief Complaint:  Left arm and leg numbness and weakness. HPI     This is a 29-year-old lady with history of alcohol abuse, came to the emergency room with complaints of left arm and left leg weakness and numbness which started this morning. Patient states that symptoms of numbness and weakness started this morning, moderate in severity, constant, no specific aggravating or relieving factors. No associated fevers or headache. No chest pain or shortness of breath. No nausea or vomitings. No diarrhea. No burning urination. Patient has small amount of cough related to smoking. No recent travel or sick contacts. Patient drinks heavily and her last drink was 2 days ago. Code stroke was called in the emergency room and was seen by neurologist.  As history was unclear and patient seems to be in alcohol withdrawal, TPA was not recommended by neurologist.  Hospitalist service was consulted for further evaluation and treatment. Patient was also found to have leukocytosis with a white count of 22,000 and she was also in renal failure. Blood cultures, urine cultures were sent and she received 1 dose of IV ceftriaxone in the emergency room. Review of Systems  Comprehensive 10 point ROS is done, and pertinent positives & negatives per HPI, rest of them are negative.     Past Medical History:   Diagnosis Date    Alcohol abuse 10/31/2015    Alcohol withdrawal ([facility] [address] 10/31/2015    Asthma     Other ill-defined conditions(799.89)     lumbar and thoracic scoliosis    Other ill-defined conditions(799.89)     irregular heart rate    Psychiatric disorder     ADHD    Seizures ([address]        Past Surgical History:   Procedure Laterality Date    COLONOSCOPY N/A 1/2/2019    COLONOSCOPY  BMI 20/ROOM 607 performed by Saumya Araiza MD at [address] [place] [address] hysterectomy    HX HEENT      sinus surgery. deviated septum.  HX ORTHOPAEDIC      right knee       History reviewed. Family Hx reviewed and significant for Stroke in both parents. Social History     Socioeconomic History    Marital status:      Spouse name: Not on file    Number of children: Not on file    Years of education: Not on file    Highest education level: Not on file   Tobacco Use    Smoking status: Current Every Day Smoker     Packs/day: 0.25    Smokeless tobacco: Never Used   Substance and Sexual Activity    Alcohol use: No     Alcohol/week: 0.0 oz    Drug use: No       No Known Allergies    Prior to Admission medications    Medication Sig Start Date End Date Taking? Authorizing Provider   potassium chloride (K-DUR, KLOR-CON) 20 mEq tablet Take 2 Tabs by mouth daily. 1/3/19   Chris France NP   pantoprazole (PROTONIX) 40 mg tablet Take 1 Tab by mouth Daily (before breakfast). Indications: while taking high dose ibuprofen for rib fractures 17   Kenna Diaz MD   senna-docusate (PERICOLACE) 8.6-50 mg per tablet Take 1 Tab by mouth two (2) times a day. 17   Kenna Diaz MD   LORazepam (ATIVAN) 1 mg tablet Take 1 Tab by mouth every six (6) hours as needed for Anxiety. Max Daily Amount: 4 mg. 17   Stiven Hilton MD         Physical Exam     Visit Vitals  /87   Pulse 92   Temp 97.9 °F (36.6 °C)   Resp 16   Ht 5' 6\" (1.676 m)   Wt 54.4 kg (120 lb)   SpO2 97%   BMI 19.37 kg/m²      Temp (24hrs), Av.9 °F (36.6 °C), Min:97.9 °F (36.6 °C), Max:97.9 °F (36.6 °C)    Oxygen Therapy  O2 Sat (%): 97 % (19 1347)  Pulse via Oximetry: 92 beats per minute (19 1347)  O2 Device: Room air (19 0847)  No intake or output data in the 24 hours ending 19 1409     General: Conscious, No acute distress  Eyes:  TRAE, No pallor/icterus, No Nystagmus.     HENT:             Oral Mucosa is dry, No sinus tenderness  Neck:               Supple, No JVD  Lungs:  CTA Bilaterally, No significant wheezing  Heart:  S1 S2 regular  Abdomen: Soft, Positive bowel sounds, NTND, No guarding/rigidity/rebound tend. Extremities: No pedal edema    Neurologic:  AAOX3. Deep tendon reflexes, cranial nerves are normal.  Motor strength and sensations are normal in right upper and right lower extremity. Decreased sensation to touch in the left arm and left leg. Finger-to-nose test is normal.  Motor weakness in the left arm and left leg is inconsistent. When I asked her to hold my hand she states she could not with her left hand but she was able to drink water and make up with her left hand. Skin:                No acute rashes  Musculoskeletal: No Acute findings  Psych:             Anxious appearing. . Tremulous+    LAB  Recent Results (from the past 24 hour(s))   CBC WITH AUTOMATED DIFF    Collection Time: 07/11/19 10:34 AM   Result Value Ref Range    WBC 22.3 (H) 4.3 - 11.1 K/uL    RBC 5.27 (H) 4.05 - 5.2 M/uL    HGB 15.7 (H) 11.7 - 15.4 g/dL    HCT 45.4 35.8 - 46.3 %    MCV 86.1 79.6 - 97.8 FL    MCH 29.8 26.1 - 32.9 PG    MCHC 34.6 31.4 - 35.0 g/dL    RDW 13.7 11.9 - 14.6 %    PLATELET 888 (H) 034 - 450 K/uL    MPV 9.3 (L) 9.4 - 12.3 FL    ABSOLUTE NRBC 0.00 0.0 - 0.2 K/uL    DF AUTOMATED      NEUTROPHILS 88 (H) 43 - 78 %    LYMPHOCYTES 4 (L) 13 - 44 %    MONOCYTES 7 4.0 - 12.0 %    EOSINOPHILS 0 (L) 0.5 - 7.8 %    BASOPHILS 0 0.0 - 2.0 %    IMMATURE GRANULOCYTES 1 0.0 - 5.0 %    ABS. NEUTROPHILS 19.6 (H) 1.7 - 8.2 K/UL    ABS. LYMPHOCYTES 0.9 0.5 - 4.6 K/UL    ABS. MONOCYTES 1.6 (H) 0.1 - 1.3 K/UL    ABS. EOSINOPHILS 0.0 0.0 - 0.8 K/UL    ABS. BASOPHILS 0.1 0.0 - 0.2 K/UL    ABS. IMM.  GRANS. 0.1 0.0 - 0.5 K/UL   ETHYL ALCOHOL    Collection Time: 07/11/19 10:34 AM   Result Value Ref Range    ALCOHOL(ETHYL),SERUM <3 MG/DL   POC PT/INR    Collection Time: 07/11/19 10:34 AM   Result Value Ref Range    Prothrombin time (POC) 13.5 (H) 9.6 - 11.6 SECS    INR (POC) 1.1 0.9 - 1. 2     METABOLIC PANEL, COMPREHENSIVE    Collection Time: 07/11/19 11:32 AM   Result Value Ref Range    Sodium 136 136 - 145 mmol/L    Potassium 3.1 (L) 3.5 - 5.1 mmol/L    Chloride 98 98 - 107 mmol/L    CO2 27 21 - 32 mmol/L    Anion gap 11 7 - 16 mmol/L    Glucose 106 (H) 65 - 100 mg/dL    BUN 28 (H) 8 - 23 MG/DL    Creatinine 2.02 (H) 0.6 - 1.0 MG/DL    GFR est AA 32 (L) >60 ml/min/1.73m2    GFR est non-AA 26 (L) >60 ml/min/1.73m2    Calcium 9.1 8.3 - 10.4 MG/DL    Bilirubin, total 0.3 0.2 - 1.1 MG/DL    ALT (SGPT) 18 12 - 65 U/L    AST (SGOT) 93 (H) 15 - 37 U/L    Alk. phosphatase 159 (H) 50 - 136 U/L    Protein, total 7.5 6.3 - 8.2 g/dL    Albumin 3.8 3.2 - 4.6 g/dL    Globulin 3.7 (H) 2.3 - 3.5 g/dL    A-G Ratio 1.0 (L) 1.2 - 3.5     MAGNESIUM    Collection Time: 07/11/19 11:32 AM   Result Value Ref Range    Magnesium 2.7 (H) 1.8 - 2.4 mg/dL   ETHYL ALCOHOL    Collection Time: 07/11/19 11:32 AM   Result Value Ref Range    ALCOHOL(ETHYL),SERUM <3 MG/DL   DRUG SCREEN, URINE    Collection Time: 07/11/19  1:09 PM   Result Value Ref Range    PCP(PHENCYCLIDINE) NEGATIVE       BENZODIAZEPINES NEGATIVE       COCAINE NEGATIVE       AMPHETAMINES NEGATIVE       METHADONE NEGATIVE       THC (TH-CANNABINOL) NEGATIVE       OPIATES NEGATIVE       BARBITURATES NEGATIVE      URINE MICROSCOPIC    Collection Time: 07/11/19  1:09 PM   Result Value Ref Range    WBC 0 0 /hpf    RBC 0 0 /hpf    Epithelial cells 0 0 /hpf    Bacteria 0 0 /hpf    Casts HYALINE 0 /lpf    Crystals, urine 0 0 /LPF    Mucus 0 0 /lpf   GLUCOSE, POC    Collection Time: 07/11/19  1:11 PM   Result Value Ref Range    Glucose (POC) 94 65 - 100 mg/dL       IMAGING:     Ct Head Wo Cont    Result Date: 7/11/2019  IMPRESSION: No acute intracranial abnormality. Xr Chest Port    Result Date: 7/11/2019  IMPRESSION: No acute cardiopulmonary abnormality.       All Micro Results     Procedure Component Value Units Date/Time    CULTURE, URINE [[id number]]     Order Status:  Sent Specimen:  Cath Urine     CULTURE, BLOOD [[id number]] Collected:  07/11/19 1146    Order Status:  Completed Specimen:  Blood Updated:  07/11/19 1214    CULTURE, BLOOD [[id number]] Collected:  07/11/19 1132    Order Status:  Completed Specimen:  Blood Updated:  07/11/19 1154          EKG: Ordered and pending  CXR: Personally reviewed and shows no acute findings    Assessment/Plan     1. Left arm and left leg weakness: Could be secondary to acute CVA. Motor strength examination is somewhat inconsistent and there could be a functional component. We will place the patient on aspirin, statin, check lipid panel, TSH, B12.  Neurochecks and will get MRI of the brain. Get 2D echo to evaluate her heart function and look for any PFO. Consult PT and OT. 2.  PASQUALE: Likely secondary to dehydration and hypovolemia. Urinalysis and urine cultures are pending. Urine dipstick looks okay. Place her on IV fluids and monitor urine output, creatinine, electrolytes. 3.  Hypokalemia: Being replaced. 4.  Leukocytosis: Likely secondary to severe dehydration. No clear-cut signs of any infection. Chest x-ray and urine dipstick is normal.  Patient has no diarrhea or abdominal pain. Check lactic acid, procalcitonin, blood and urine cultures. Patient received 1 dose of IV ceftriaxone in the ER. Will hold off on any more antibiotics. Keep her on IV fluids and monitor WBC count. 5.  Alcohol abuse and early alcohol withdrawal symptoms: Started on alcohol withdrawal protocol with banana bag, scheduled Librium, PRN Ativan. She was counseled regarding cessation. 6.  Tobacco abuse: Patient was strongly advised to quit smoking, counseled for 3 minutes regarding tobacco cessation. She was given nicotine patch to help with the craving and advised outpatient follow-up.     Patient is being admitted to inpatient status, needs more than 2 midnights of hospital stay to treat her acute renal failure, alcohol withdrawal and further evaluation of CVA. All the pertinent investigative studies and treatment plan was discussed with the patient. Further recommendations as per the clinical course. DVT prophylaxis: SCDs  Code status: Full  Risk: High risk    Nicole Nicole MD  July 11, 2019    Pt has acute rhabdomyolysis likely from alcohol. Aggressive IV hydration. No statin. Monitor CK levels. Pt had a fever of 101F, could be secondary to URI. Will place her on Levaquin and monitor. F/U Urine and blood cultures.

## 2019-07-11 NOTE — CONSULTS
Consult    Patient: Paxton Mcpherson MRN: [id number]     YOB: 1955  Age: 62 y.o. Sex: female      Subjective:      Paxton Mcpherson is a 62 y.o. female who is being seen for code S. The patient initially presented to the ED with complaint of generalized body aches. The patient has a history of ETOH abuse and had been binge drinking and not eating for several days. While in the waiting room she reported that she was also experiencing left sided weakness and numbness for the last several days. The patient was disoriented and last known well was unclear. A code S was called at 1030. Neurology arrived to the bedside at 1032. Initial NIHSS was 2. A CT of the head was obtained and was negative for acute abnormality. Past Medical History:   Diagnosis Date    Alcohol abuse 10/31/2015    Alcohol withdrawal ([facility] [address] 10/31/2015    Asthma     Other ill-defined conditions(799.89)     lumbar and thoracic scoliosis    Other ill-defined conditions(799.89)     irregular heart rate    Psychiatric disorder     ADHD    Seizures ([facility] [address]      Past Surgical History:   Procedure Laterality Date    COLONOSCOPY N/A 1/2/2019    COLONOSCOPY  BMI 20/ROOM 607 performed by Susanna Box MD at [facility] HX GYN      hysterectomy    HX HEENT      sinus surgery. deviated septum.  HX ORTHOPAEDIC      right knee      History reviewed. No pertinent family history.   Social History     Tobacco Use    Smoking status: Current Every Day Smoker     Packs/day: 0.25    Smokeless tobacco: Never Used   Substance Use Topics    Alcohol use: No     Alcohol/week: 0.0 oz      Current Facility-Administered Medications   Medication Dose Route Frequency Provider Last Rate Last Dose    0.9% sodium chloride infusion 1,000 mL  1,000 mL IntraVENous ONCE Alva Davis MD        cefTRIAXone (ROCEPHIN) 1 g in 0.9% sodium chloride (MBP/ADV) 50 mL  1 g IntraVENous NOW Ritesh Bryant MD         Current Outpatient Medications   Medication Sig Dispense Refill    potassium chloride (K-DUR, KLOR-CON) 20 mEq tablet Take 2 Tabs by mouth daily. 7 Tab 0    pantoprazole (PROTONIX) 40 mg tablet Take 1 Tab by mouth Daily (before breakfast). Indications: while taking high dose ibuprofen for rib fractures 30 Tab 1    senna-docusate (PERICOLACE) 8.6-50 mg per tablet Take 1 Tab by mouth two (2) times a day. 60 Tab -    LORazepam (ATIVAN) 1 mg tablet Take 1 Tab by mouth every six (6) hours as needed for Anxiety. Max Daily Amount: 4 mg. 20 Tab 0        No Known Allergies    Review of Systems:  Not obtained due to emergent situation         Objective:     Vitals:    07/11/19 0831   BP: (!) 132/93   Pulse: (!) 120   Resp: 16   Temp: 97.9 °F (36.6 °C)   SpO2: 97%   Weight: 120 lb (54.4 kg)   Height: 5' 6\" (1.676 m)        Physical Exam:  General - Disheveled appearance. Confused. In NAD. HEENT - Normocephalic, atraumatic. Conjunctiva, tympanic membranes, and oropharynx are clear. Neck - Supple without masses, no bruits   Cardiovascular - Regular rate and rhythm. Normal S1, S2 without murmurs, rubs, or gallops. Lungs - Clear to auscultation. Abdomen - Soft, nontender with normal bowel sounds. Extremities - Peripheral pulses intact. No edema and no rashes.        NIHSS   NIHSS Score: 2  1a-Level of Consciousness 0  1b-What is Month/Age 0  1c-Open/Close Eyes&Hand 0  2 -Best Gaze 0  3 -Visual Fields 0  4 -Facial Palsy 0  5a-Motor-Left Arm 0  5b-Motor-Right Arm 0  6a-Motor-Left Leg 1  6b-Motor-Right Leg 0  7 -Limb Ataxia 0  8 -Sensory 1  9 -Best Language 0  10-Dysarthria 0  11-Extinction/Inattention 0    No results found for: CHOL, CHOLPOCT, CHOLX, CHLST, CHOLV, HDL, HDLPOC, LDL, LDLCPOC, LDLC, DLDLP, VLDLC, VLDL, TGLX, TRIGL, TRIGP, TGLPOCT, CHHD, CHHDX     No results found for: HBA1C, HGBE8, GWL8IOAY, ZDK6NIIQ     CT Results (most recent):  Results from Hospital Encounter encounter on 07/11/19   CT HEAD WO CONT    Narrative CT of the head without contrast.    CLINICAL INDICATION: Left-sided weakness that started one hour ago, code stroke    PROCEDURE: Serial thin section axial images are obtained from the cranial vertex  through the skull base without the administration of intravenous contrast.   Radiation dose reduction techniques were used for this study. Our CT scanners  use one or all of the following: Automated exposure control, adjusted of the mA  and/or kV according to patient size, iterative reconstruction    COMPARISON: Head CT dated 3/17/2019. FINDINGS: There is no acute intracranial hemorrhage, mass, or mass effect. No  abnormal extra-axial fluid collections identified. There is no hydrocephalus. The basilar cisterns are widely patent. The gray-white matter brain parenchymal  interface is well-maintained. No skull fracture or aggressive osseous lesion  noted. The mastoid air cells and included paranasal sinuses are clear. Impression IMPRESSION: No acute intracranial abnormality. Results for orders placed or performed during the hospital encounter of 03/17/19   EKG, 12 LEAD, INITIAL   Result Value Ref Range    Ventricular Rate 99 BPM    Atrial Rate 99 BPM    P-R Interval 140 ms    QRS Duration 80 ms    Q-T Interval 354 ms    QTC Calculation (Bezet) 454 ms    Calculated P Axis 74 degrees    Calculated R Axis 84 degrees    Calculated T Axis 66 degrees    Diagnosis       !! AGE AND GENDER SPECIFIC ECG ANALYSIS !! Normal sinus rhythm  ST abnormality, possible digitalis effect  Abnormal ECG  When compared with ECG of 31-DEC-2018 00:20,  ST less depressed in Anterior leads  Nonspecific T wave abnormality no longer evident in Lateral leads  Confirmed by Paty Diaz ([id number]) on 3/17/2019 11:21:22 AM            Most recent MRA:  No results found for this or any previous visit. Most recent Echo:  No results found for this visit on 07/11/19.         Assessment:     62year old female seen as a code S with left sided numbness. The patient initially presented with generalized body aches and weakness after binge drinking for several days. She then reported left sided numbness. The onset of these symptoms was unclear and a definitive last known normal was unable to be established. Initial NIHSS was 2. CT of the head was obtained and was negative for acute abnormality . CTA of head neck was not obtained due to low NIHSS. The patient was not a candidate for alteplase because a clear last known normal could not be established and low NIHSS. . The patient was not a candidate for mechanical thrombectomy due to low NIHSS. Plan:     MRI of brain   Neuro checks q4    Signed By: Karen Swift NP     July 11, 2019      I spent 50 minutes with the patient. Over 50% of that time was spent face-to-face with the patient and family.

## 2019-07-11 NOTE — PROGRESS NOTES
Called Dr. Sergio El. Patient with HR in 130s (Sinus tach) and now with fever of 101. No new orders received but MD to review chart. Tylenol given. MEWS score noted to be 5  Charge nurse, Magaly, RN informed and assessed patient. High MEWS score noted due to  and fever. Vitals signs to be completed every 2 hours. Will continue to monitor.
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
## 2019-07-11 NOTE — ED TRIAGE NOTES
Pt reports generalized body aches. Pt has a hx of ETOH abuse, has not drank in 12 hours. States has not eating in 12 hours as well. Temp 97.7, .

## 2019-07-11 NOTE — ED NOTES
Back from CT at this time. Stoke alert was called by Bambi Napoles when pt informed  during his assessment that her left leg and left arm were numb and it started while she was waiting in the waiting room. Initially pt unable to lift left leg. When in CT pt asked to moved to CT table and pt able to lift both legs and move herself to CT table with no assistance from staff.

## 2019-07-12 ENCOUNTER — APPOINTMENT (OUTPATIENT)
Dept: GENERAL RADIOLOGY | Age: 64
DRG: 683 | End: 2019-07-12
Attending: FAMILY MEDICINE
Payer: MEDICARE

## 2019-07-12 VITALS
DIASTOLIC BLOOD PRESSURE: 80 MMHG | HEART RATE: 102 BPM | HEIGHT: 65 IN | BODY MASS INDEX: 19.06 KG/M2 | OXYGEN SATURATION: 98 % | TEMPERATURE: 98.4 F | RESPIRATION RATE: 17 BRPM | SYSTOLIC BLOOD PRESSURE: 120 MMHG | WEIGHT: 114.4 LBS

## 2019-07-12 LAB
ALBUMIN SERPL-MCNC: 2.6 G/DL (ref 3.2–4.6)
ALBUMIN/GLOB SERPL: 0.9 {RATIO} (ref 1.2–3.5)
ALP SERPL-CCNC: 120 U/L (ref 50–136)
ALT SERPL-CCNC: 14 U/L (ref 12–65)
ANION GAP SERPL CALC-SCNC: 7 MMOL/L (ref 7–16)
AST SERPL-CCNC: 53 U/L (ref 15–37)
BASOPHILS # BLD: 0 K/UL (ref 0–0.2)
BASOPHILS NFR BLD: 0 % (ref 0–2)
BILIRUB SERPL-MCNC: 0.4 MG/DL (ref 0.2–1.1)
BUN SERPL-MCNC: 20 MG/DL (ref 8–23)
CALCIUM SERPL-MCNC: 7.9 MG/DL (ref 8.3–10.4)
CHLORIDE SERPL-SCNC: 108 MMOL/L (ref 98–107)
CHOLEST SERPL-MCNC: 143 MG/DL
CO2 SERPL-SCNC: 26 MMOL/L (ref 21–32)
CREAT SERPL-MCNC: 1.29 MG/DL (ref 0.6–1)
DIFFERENTIAL METHOD BLD: ABNORMAL
EOSINOPHIL # BLD: 0 K/UL (ref 0–0.8)
EOSINOPHIL NFR BLD: 0 % (ref 0.5–7.8)
ERYTHROCYTE [DISTWIDTH] IN BLOOD BY AUTOMATED COUNT: 13.7 % (ref 11.9–14.6)
GLOBULIN SER CALC-MCNC: 2.8 G/DL (ref 2.3–3.5)
GLUCOSE SERPL-MCNC: 122 MG/DL (ref 65–100)
HCT VFR BLD AUTO: 33.3 % (ref 35.8–46.3)
HDLC SERPL-MCNC: 81 MG/DL (ref 40–60)
HDLC SERPL: 1.8 {RATIO}
HGB BLD-MCNC: 11.2 G/DL (ref 11.7–15.4)
IMM GRANULOCYTES # BLD AUTO: 0.1 K/UL (ref 0–0.5)
IMM GRANULOCYTES NFR BLD AUTO: 1 % (ref 0–5)
LDLC SERPL CALC-MCNC: 42.8 MG/DL
LIPID PROFILE,FLP: ABNORMAL
LYMPHOCYTES # BLD: 1.5 K/UL (ref 0.5–4.6)
LYMPHOCYTES NFR BLD: 15 % (ref 13–44)
MAGNESIUM SERPL-MCNC: 2 MG/DL (ref 1.8–2.4)
MCH RBC QN AUTO: 29.9 PG (ref 26.1–32.9)
MCHC RBC AUTO-ENTMCNC: 33.6 G/DL (ref 31.4–35)
MCV RBC AUTO: 89 FL (ref 79.6–97.8)
MONOCYTES # BLD: 0.7 K/UL (ref 0.1–1.3)
MONOCYTES NFR BLD: 7 % (ref 4–12)
NEUTS SEG # BLD: 7.7 K/UL (ref 1.7–8.2)
NEUTS SEG NFR BLD: 77 % (ref 43–78)
NRBC # BLD: 0 K/UL (ref 0–0.2)
PHOSPHATE SERPL-MCNC: 1.6 MG/DL (ref 2.3–3.7)
PLATELET # BLD AUTO: 315 K/UL (ref 150–450)
PMV BLD AUTO: 9.3 FL (ref 9.4–12.3)
POTASSIUM SERPL-SCNC: 3.7 MMOL/L (ref 3.5–5.1)
PROT SERPL-MCNC: 5.4 G/DL (ref 6.3–8.2)
RBC # BLD AUTO: 3.74 M/UL (ref 4.05–5.2)
SODIUM SERPL-SCNC: 141 MMOL/L (ref 136–145)
TRIGL SERPL-MCNC: 96 MG/DL (ref 35–150)
TSH SERPL DL<=0.005 MIU/L-ACNC: 0.26 UIU/ML (ref 0.36–3.74)
VIT B12 SERPL-MCNC: 189 PG/ML (ref 193–986)
VLDLC SERPL CALC-MCNC: 19.2 MG/DL (ref 6–23)
WBC # BLD AUTO: 10 K/UL (ref 4.3–11.1)

## 2019-07-12 PROCEDURE — 36415 COLL VENOUS BLD VENIPUNCTURE: CPT

## 2019-07-12 PROCEDURE — 85025 COMPLETE CBC W/AUTO DIFF WBC: CPT

## 2019-07-12 PROCEDURE — 80061 LIPID PANEL: CPT

## 2019-07-12 PROCEDURE — 74011250637 HC RX REV CODE- 250/637: Performed by: HOSPITALIST

## 2019-07-12 PROCEDURE — 82607 VITAMIN B-12: CPT

## 2019-07-12 PROCEDURE — 99232 SBSQ HOSP IP/OBS MODERATE 35: CPT | Performed by: PSYCHIATRY & NEUROLOGY

## 2019-07-12 PROCEDURE — 84100 ASSAY OF PHOSPHORUS: CPT

## 2019-07-12 PROCEDURE — 99218 HC RM OBSERVATION: CPT

## 2019-07-12 PROCEDURE — 74011000250 HC RX REV CODE- 250: Performed by: FAMILY MEDICINE

## 2019-07-12 PROCEDURE — 96367 TX/PROPH/DG ADDL SEQ IV INF: CPT

## 2019-07-12 PROCEDURE — 97166 OT EVAL MOD COMPLEX 45 MIN: CPT

## 2019-07-12 PROCEDURE — 74011250636 HC RX REV CODE- 250/636: Performed by: HOSPITALIST

## 2019-07-12 PROCEDURE — 96366 THER/PROPH/DIAG IV INF ADDON: CPT

## 2019-07-12 PROCEDURE — 92610 EVALUATE SWALLOWING FUNCTION: CPT

## 2019-07-12 PROCEDURE — 80053 COMPREHEN METABOLIC PANEL: CPT

## 2019-07-12 PROCEDURE — 84443 ASSAY THYROID STIM HORMONE: CPT

## 2019-07-12 PROCEDURE — 96361 HYDRATE IV INFUSION ADD-ON: CPT

## 2019-07-12 PROCEDURE — 83735 ASSAY OF MAGNESIUM: CPT

## 2019-07-12 PROCEDURE — 93306 TTE W/DOPPLER COMPLETE: CPT

## 2019-07-12 PROCEDURE — 74011250636 HC RX REV CODE- 250/636: Performed by: FAMILY MEDICINE

## 2019-07-12 RX ORDER — BISMUTH SUBSALICYLATE 262 MG
1 TABLET,CHEWABLE ORAL DAILY
Qty: 30 TAB | Refills: 0 | Status: SHIPPED | OUTPATIENT
Start: 2019-07-12 | End: 2019-08-11

## 2019-07-12 RX ORDER — UREA 10 %
50 LOTION (ML) TOPICAL DAILY
Qty: 30 TAB | Refills: 0 | Status: SHIPPED | OUTPATIENT
Start: 2019-07-12 | End: 2019-08-11

## 2019-07-12 RX ORDER — CYANOCOBALAMIN 1000 UG/ML
1000 INJECTION, SOLUTION INTRAMUSCULAR; SUBCUTANEOUS DAILY
Status: DISCONTINUED | OUTPATIENT
Start: 2019-07-12 | End: 2019-07-12 | Stop reason: HOSPADM

## 2019-07-12 RX ORDER — PANTOPRAZOLE SODIUM 40 MG/1
40 TABLET, DELAYED RELEASE ORAL
Qty: 30 TAB | Refills: 0 | Status: SHIPPED | OUTPATIENT
Start: 2019-07-13 | End: 2019-08-12

## 2019-07-12 RX ADMIN — MULTIPLE VITAMINS W/ MINERALS TAB 1 TABLET: TAB at 10:08

## 2019-07-12 RX ADMIN — LORAZEPAM 1 MG: 1 TABLET ORAL at 10:13

## 2019-07-12 RX ADMIN — ASPIRIN 81 MG 81 MG: 81 TABLET ORAL at 10:08

## 2019-07-12 RX ADMIN — FOLIC ACID 1 MG: 1 TABLET ORAL at 10:08

## 2019-07-12 RX ADMIN — PANTOPRAZOLE SODIUM 40 MG: 40 TABLET, DELAYED RELEASE ORAL at 10:13

## 2019-07-12 RX ADMIN — CHLORDIAZEPOXIDE HYDROCHLORIDE 25 MG: 25 CAPSULE ORAL at 10:13

## 2019-07-12 RX ADMIN — LORAZEPAM 1 MG: 1 TABLET ORAL at 05:56

## 2019-07-12 RX ADMIN — TRAMADOL HYDROCHLORIDE 50 MG: 50 TABLET, FILM COATED ORAL at 05:05

## 2019-07-12 RX ADMIN — Medication 100 MG: at 10:08

## 2019-07-12 RX ADMIN — SODIUM CHLORIDE AND POTASSIUM CHLORIDE: .9; .15 SOLUTION INTRAVENOUS at 05:56

## 2019-07-12 RX ADMIN — LORAZEPAM 1 MG: 1 TABLET ORAL at 01:45

## 2019-07-12 RX ADMIN — Medication 5 ML: at 05:09

## 2019-07-12 RX ADMIN — SODIUM CHLORIDE: 900 INJECTION, SOLUTION INTRAVENOUS at 11:14

## 2019-07-12 RX ADMIN — PANTOPRAZOLE SODIUM 40 MG: 40 TABLET, DELAYED RELEASE ORAL at 05:56

## 2019-07-12 RX ADMIN — Medication 10 ML: at 11:20

## 2019-07-12 NOTE — PROGRESS NOTES
Initial visit was made, emotional support and a spiritual presence were provided. The patient was not alert.  card was left with the patient.         [employer]

## 2019-07-12 NOTE — PROGRESS NOTES
Patient refused to take any medications scheduled for afternoon. Pt educated on purpose and benefits of medications. Pt states she has to leave and cannot stay to receive medications prior to discharge. IV's and heart monitor removed. Discharge papers completed by discharge RN. Navya Villegas NP informed that patient refused medications and is leaving.

## 2019-07-12 NOTE — PROGRESS NOTES
Neurology Daily Progress Note     Assessment:     62year old female seen as a code S with left sided numbness. The patient initially presented with generalized body aches and weakness after binge drinking for several days. She then reported left sided numbness. The onset of these symptoms was unclear and a definitive last known normal was unable to be established. CT of the head was obtained and was negative for acute abnormality . CTA of head neck was not obtained due to low NIHSS. MRI of brain negative for acute intracranial abnormality. TTE negative for PFO or atrial enlargement. Stroke work up negative. No further neurological work up required. Will start Cyanocobalamin 1000 mcg IM injection daily for 7 days and Thiamine 500 mg IV for 3 days for vitamin B 12 deficiency. Plan:     Start Thiamine 500mg IV x 3 days. Start Cyanocobalamin 1000 mcg IM injection daily for 7 days for vitamin B 12 deficiency. Continue supportive therapy. Subjective: Interval history:    Patient resting in bed. Continue to have subjective left sided numbness, improving. Reported pain in lower back on left side, stated she has fallen in the past with previous hx of fractured ribs. MRI of head negative for acute intracranial abnormality. History:    Narcisa Goff is a 62 y.o. female who is being seen for Code S. Review of systems negative with exception of pertinent positives and negatives noted above.        Objective:     Vitals:    07/11/19 2135 07/12/19 0058 07/12/19 0501 07/12/19 0830   BP: 114/67 116/75 122/71 122/72   Pulse: (!) 137 (!) 124 (!) 118 (!) 104   Resp: 20 19 19 16   Temp: 98.9 °F (37.2 °C) 98.3 °F (36.8 °C) 98.9 °F (37.2 °C) 98.2 °F (36.8 °C)   SpO2: 97% 100% 99% 96%   Weight:   114 lb 6.4 oz (51.9 kg)    Height:              Current Facility-Administered Medications:     potassium phosphate 30 mmol in 0.9% sodium chloride 250 mL infusion, , IntraVENous, ONCE, Adalberto Sanchez MD  [facility] chlordiazePOXIDE (LIBRIUM) capsule 25 mg, 25 mg, Oral, QID, Ritesh Durham MD, 25 mg at 07/12/19 1013    0.9% sodium chloride with KCl 20 mEq/L infusion, , IntraVENous, CONTINUOUS, Ritesh Durham MD, Last Rate: 150 mL/hr at 07/12/19 1018    pantoprazole (PROTONIX) tablet 40 mg, 40 mg, Oral, ACB, Ritesh Durham MD, 40 mg at 07/12/19 1013    sodium chloride (NS) flush 5-40 mL, 5-40 mL, IntraVENous, Q8H, Nidia Infante MD, 5 mL at 07/12/19 0509    acetaminophen (TYLENOL) tablet 650 mg, 650 mg, Oral, Q4H PRN, Ritesh Durham MD, 650 mg at 07/11/19 1747    naloxone (NARCAN) injection 0.4 mg, 0.4 mg, IntraVENous, PRN, Ritesh Durham MD    diphenhydrAMINE (BENADRYL) capsule 25 mg, 25 mg, Oral, Q4H PRN, Ritesh Durham MD    ondansetron [facility]) injection 4 mg, 4 mg, IntraVENous, Q4H PRN, Ritesh Durham MD    magnesium hydroxide (MILK OF MAGNESIA) 400 mg/5 mL oral suspension 30 mL, 30 mL, Oral, DAILY PRN, Ritesh Durham MD    traMADol (ULTRAM) tablet 50 mg, 50 mg, Oral, Q6H PRN, Ritesh Durham MD, 50 mg at 07/12/19 0505    nicotine (NICODERM CQ) 14 mg/24 hr patch 1 Patch, 1 Patch, TransDERmal, Q24H, Ritesh Durham MD, 1 Patch at 07/11/19 2021    sodium chloride (NS) flush 5-40 mL, 5-40 mL, IntraVENous, Q8H, Nidia Infante MD, 10 mL at 07/12/19 1120    sodium chloride (NS) flush 5-40 mL, 5-40 mL, IntraVENous, PRN, Ritesh Durham MD    sodium chloride (NS) flush 5-40 mL, 5-40 mL, IntraVENous, PRN, Ritesh Durham MD    LORazepam (ATIVAN) tablet 1 mg, 1 mg, Oral, Q4H PRN, Ritesh Durham MD, 1 mg at 07/12/19 1013    LORazepam (ATIVAN) tablet 2 mg, 2 mg, Oral, Q4H PRN, Ritesh Durham MD    folic acid (FOLVITE) tablet 1 mg, 1 mg, Oral, DAILY, Ritesh Durham MD, 1 mg at 07/12/19 1008    thiamine HCL (B-1) tablet 100 mg, 100 mg, Oral, DAILY, Ritesh Durham MD, 100 mg at 07/12/19 1008    multivitamin, tx-iron-ca-min (THERA-M w/ IRON) tablet 1 Tab, 1 Tab, Oral, DAILY, Ritesh Durham MD, 1 Tab at 07/12/19 1008    aspirin chewable tablet 81 mg, 81 mg, Oral, DAILY, Otilio Mcdaniel MD, 81 mg at 07/12/19 1008    levoFLOXacin (LEVAQUIN) 750 mg in D5W IVPB, 750 mg, IntraVENous, Q48H, Otilio Mcdaniel MD, Last Rate: 100 mL/hr at 07/11/19 2021, 750 mg at 07/11/19 2021    Recent Results (from the past 12 hour(s))   CBC WITH AUTOMATED DIFF    Collection Time: 07/12/19  4:08 AM   Result Value Ref Range    WBC 10.0 4.3 - 11.1 K/uL    RBC 3.74 (L) 4.05 - 5.2 M/uL    HGB 11.2 (L) 11.7 - 15.4 g/dL    HCT 33.3 (L) 35.8 - 46.3 %    MCV 89.0 79.6 - 97.8 FL    MCH 29.9 26.1 - 32.9 PG    MCHC 33.6 31.4 - 35.0 g/dL    RDW 13.7 11.9 - 14.6 %    PLATELET 308 841 - 331 K/uL    MPV 9.3 (L) 9.4 - 12.3 FL    ABSOLUTE NRBC 0.00 0.0 - 0.2 K/uL    DF AUTOMATED      NEUTROPHILS 77 43 - 78 %    LYMPHOCYTES 15 13 - 44 %    MONOCYTES 7 4.0 - 12.0 %    EOSINOPHILS 0 (L) 0.5 - 7.8 %    BASOPHILS 0 0.0 - 2.0 %    IMMATURE GRANULOCYTES 1 0.0 - 5.0 %    ABS. NEUTROPHILS 7.7 1.7 - 8.2 K/UL    ABS. LYMPHOCYTES 1.5 0.5 - 4.6 K/UL    ABS. MONOCYTES 0.7 0.1 - 1.3 K/UL    ABS. EOSINOPHILS 0.0 0.0 - 0.8 K/UL    ABS. BASOPHILS 0.0 0.0 - 0.2 K/UL    ABS. IMM. GRANS. 0.1 0.0 - 0.5 K/UL   METABOLIC PANEL, COMPREHENSIVE    Collection Time: 07/12/19  4:08 AM   Result Value Ref Range    Sodium 141 136 - 145 mmol/L    Potassium 3.7 3.5 - 5.1 mmol/L    Chloride 108 (H) 98 - 107 mmol/L    CO2 26 21 - 32 mmol/L    Anion gap 7 7 - 16 mmol/L    Glucose 122 (H) 65 - 100 mg/dL    BUN 20 8 - 23 MG/DL    Creatinine 1.29 (H) 0.6 - 1.0 MG/DL    GFR est AA 54 (L) >60 ml/min/1.73m2    GFR est non-AA 44 (L) >60 ml/min/1.73m2    Calcium 7.9 (L) 8.3 - 10.4 MG/DL    Bilirubin, total 0.4 0.2 - 1.1 MG/DL    ALT (SGPT) 14 12 - 65 U/L    AST (SGOT) 53 (H) 15 - 37 U/L    Alk.  phosphatase 120 50 - 136 U/L    Protein, total 5.4 (L) 6.3 - 8.2 g/dL    Albumin 2.6 (L) 3.2 - 4.6 g/dL    Globulin 2.8 2.3 - 3.5 g/dL    A-G Ratio 0.9 (L) 1.2 - 3.5     TSH 3RD GENERATION    Collection Time: 07/12/19  4:08 AM   Result Value Ref Range    TSH 0.260 (L) 0.358 - 3.740 uIU/mL   VITAMIN B12    Collection Time: 07/12/19  4:08 AM   Result Value Ref Range    Vitamin B12 189 (L) 193 - 986 pg/mL   LIPID PANEL    Collection Time: 07/12/19  4:08 AM   Result Value Ref Range    LIPID PROFILE          Cholesterol, total 143 <200 MG/DL    Triglyceride 96 35 - 150 MG/DL    HDL Cholesterol 81 (H) 40 - 60 MG/DL    LDL, calculated 42.8 <100 MG/DL    VLDL, calculated 19.2 6.0 - 23.0 MG/DL    CHOL/HDL Ratio 1.8     MAGNESIUM    Collection Time: 07/12/19  4:08 AM   Result Value Ref Range    Magnesium 2.0 1.8 - 2.4 mg/dL   PHOSPHORUS    Collection Time: 07/12/19  4:08 AM   Result Value Ref Range    Phosphorus 1.6 (L) 2.3 - 3.7 MG/DL     Lab Results   Component Value Date/Time    Vitamin B12 189 (L) 07/12/2019 04:08 AM    Folate 18.1 (H) 01/02/2019 01:35 PM     MRI Results (most recent):  Results from Hospital Encounter encounter on 07/11/19   MRI BRAIN WO CONT    Narrative MRI of the brain     INDICATION: Left side extremity weakness    Standard MRI sequences were obtained through the brain in multiple planes  without IV contrast    FINDINGS:  Slight increased signal in the occipital white matter bilaterally, likely  chronic change. .  There is no evidence of acute hemorrhage or infarction. The  ventricles are normal in size. There are no extra-axial fluid collections. There are no intracranial masses. There are no bony lesions. The sinuses are  clear. Impression IMPRESSION: No evidence of acute intracranial abnormality           Physical Exam:  General - Well developed, disheveled. HEENT - Normocephalic, atraumatic. Conjunctiva are clear. Neck - Supple without masses  Cardiovascular - Regular rate and rhythm. Normal S1, S2 without murmurs, rubs, or gallops. Lungs - Clear to auscultation. Abdomen - Soft, nontender with normal bowel sounds. Extremities - Peripheral pulses intact. No edema and no rashes. Neurological examination - Alert and oriented to person, place, and time. Comprehension, attention, memory and reasoning are intact. Language and speech are normal.   On cranial nerve examination, (II, III, IV, VI) pupils are equal, round, and reactive to light. Visual acuity is adequate. Visual fields are full to finger confrontation. Extraocular motility is normal. (V, VII) Face is symmetric and sensation is intact to light touch. (VIII) Hearing is intact. (IX, X) Palate and uvula elevate symmetrically. Voice is normal. (XI) Shoulder shrug is strong and equal bilaterally. (XII)Tongue is midline. Motor examination - There is normal muscle tone and bulk. Power 5/5 RUE/RLE/LUE, 4/5 LLE. Muscle stretch reflexes are normoactive and there are no pathological reflexes present. Plantar response is flexor. Sensation is intact to light touch, pinprick, vibration and proprioception in all extremities. Cerebellar examination is normal finger/nose.      Signed By: Kymberly Stewart NP     July 12, 2019

## 2019-07-12 NOTE — PROGRESS NOTES
Bedside and Verbal shift change report given to self (oncoming nurse) by Severiano Sage RN (offgoing nurse). Report included the following information SBAR, Kardex and MAR.

## 2019-07-12 NOTE — PROGRESS NOTES
Nutrition  Reason for assessment: BPA - Malnutrition Screening Tool positive for unintended weight loss. Recently Lost Weight Without Trying: Yes  If Yes, How Much Weight Loss: 14 - 23 lbs  Eating Poorly Due to Decreased Appetite: No   Patient states she has lost ~40 in last 8 months - question validity of history reported. Old nutrition notes and weight history reviewed  Weight and BMI 7/12/2019 7/11/2019 7/11/2019 7/8/2019 3/18/2019   Weight 51.891 kg 51.937 kg 54.432 kg 63.504 kg 48.58 kg   BMI (calculated) 19 kg/m2 19.1 kg/m2 19.4 kg/m2 22.6 kg/m2 17.8 kg/m2     Weight and BMI 3/17/2019 3/17/2019 1/2/2019 1/1/2019   Weight 48.172 kg 54.432 kg 51.302 kg 51.438 kg   BMI (calculated) 17.7 kg/m2 20 kg/m2 18.8 kg/m2 18.9 kg/m2       Assessment:     Food/Nutrition Patient History:  Inconsistent history provided by patient. She states that she eats sporadically, usually 2 meals per day. Usual intake is sandwiches, frozen dinners, or other easy to prepare meals. She states that she has not been eating as much recently due to stress from her divorce. She also states that she has recently become homeless and now has limited access to food. Labs: reviewed  PMH: etoh  Social: Patient reports that she is recently homeless. Physical: noted temporal and clavicular wasting     DIET CARDIAC Regular  DIET NUTRITIONAL SUPPLEMENTS All Meals; Ensure Enlive      Anthropometrics:Height: 5' 5\" (165.1 cm),  Weight: 51.9 kg (114 lb 6.4 oz), Weight Source: Standing scale (comment), Body mass index is 19.04 kg/m². BMI class of normal weight. Macronutrient needs: 51.9 kg listed body weight  EER:  9143-1795 kcal /day (30-35 kcal/kg)  EPR:  62-67 grams protein/day (1.2-1.3 grams/kg)    Intake/Comparative Standards: Observed meal(s): 100%.  This potentially meets ~100% of kcal and ~100% of protein needs    Nutrition Diagnosis: Inadequate protein energy intake related to decreased appetite and questionable access to food as evidenced by patient reported barrier to PO intake, reported weight loss of 40# over     Intervention:  Meals and snacks: Continue current diet. Nutrition supplement therapy: ONS ordered - vanilla per patient preference, made CA aware. No ONS on am tray - provided patient with Ensure Enlive from floor stock. Coordination of nutrition care: RN, Clayton Wahl and Case Management  Discharge Nutrition Plan: Too soon to determine.       [address] [place] [address], [address], [address]

## 2019-07-12 NOTE — PROGRESS NOTES
PT Note:  PT orders received and chart review initiated. Attempted evaluation, however, another caregiver with patient (echo?). Will attempt another time/day as schedule permits.   Mehreen Escalona, PT, DPT

## 2019-07-12 NOTE — PROGRESS NOTES
Spoke with Melania Calderon NP about patient requesting to leave AMA. Per NP patient is going to be discharged. Patient requesting a voucher for a cab, per Levi Chatterjee  RN patient does not qualify for cab voucher.

## 2019-07-12 NOTE — PROGRESS NOTES
Speech language pathology: bedside swallow note: Initial Assessment and Discharge    NAME/AGE/GENDER: Maicol Zepeda is a 62 y.o. female  DATE: 7/12/2019  PRIMARY DIAGNOSIS: PASQUALE (acute kidney injury) ([facility] [N17.9]      ICD-10: Treatment Diagnosis: R13.12 Oropharyngeal Dysphagia  INTERDISCIPLINARY COLLABORATION: Registered Nurse  PRECAUTIONS/ALLERGIES: Patient has no known allergies. ASSESSMENT:Based on the objective data described below, Ms. Vel Farnsworth presents with normal oropharyngeal swallow function. Oral mech exam unremarkable. Patient exhibited no overt s/sx airway compromise with any trialed consistencies: thin liquids via cup/straw/successive swallows or mixed consistency. Patient declined regular consistency trial. Oral prep time and oral clearance WNL. Laryngeal elevation present upon palpation with single swallow per bolus. Patient pointed to upper/mid esophagus stating globus sensation. Patient stated she has previously had esophagus dilated. May benefit from GI consult if symptoms persist. Recommend continue prescribed diet: regular consistency/thin liquids. No further speech therapy indicated at this time as swallow function is within normal limits. Please re-consult if new concerns arise. ?????? ? ? This section established at most recent assessment??????????  PROBLEM LIST (Impairments causing functional limitations):  1. Oropharyngeal dysphagia- No symptoms identified    REHABILITATION POTENTIAL FOR STATED GOALS: Excellent  PLAN OF CARE:   Patient will benefit from skilled intervention to address the following impairments.   RECOMMENDATIONS AND PLANNED INTERVENTIONS (Benefits and precautions of therapy have been discussed with the patient.):  · continue prescribed diet  MEDICATIONS:  · With liquid  ASPIRATION PRECAUTIONS:  · Slow rate of intake  · Small bites/sips  · Upright at 90 degrees during meal  COMPENSATORY STRATEGIES/MODIFICATIONS INCLUDING:  · None  OTHER RECOMMENDATIONS (including follow up treatment recommendations): · Family training/education  · Patient education  RECOMMENDED DIET MODIFICATIONS DISCUSSED WITH:  · Nursing  · Patient  FREQUENCY/DURATION: No further speech therapy indicated at this time as oropharyngeal swallow function is within normal limits. RECOMMENDED REHABILITATION/EQUIPMENT: (at time of discharge pending progress): Due to the probability of continued deficits (see above) this patient will not likely need continued skilled speech therapy after discharge. SUBJECTIVE:   Alert and oriented x4. Emotionally labile throughout. History of Present Injury/Illness: Ms. Yocasta Fajardo  has a past medical history of Alcohol abuse (10/31/2015), Alcohol withdrawal ([facility] (10/31/2015), Asthma, Other ill-defined conditions(799.89), Other ill-defined conditions(799.89), Psychiatric disorder, and Seizures ([address] She also has no past medical history of Arthritis, Autoimmune disease ([address] CAD (coronary artery disease), Cancer ([address] Chronic kidney disease, COPD, Dementia, Diabetes ([address] Endocrine disease, Heart failure ([address] Hypertension, Infectious disease, Liver disease, PUD (peptic ulcer disease), Sleep disorder, Stroke ([address] or Thromboembolus ([address] .  She also  has a past surgical history that includes hx gyn; hx orthopaedic; hx heent; and colonoscopy (N/A, 1/2/2019). Present Symptoms:    Pain Scale 1: Numeric (0 - 10)  Pain Intensity 1: 8  Pain Location 1: Rib cage  Pain Intervention(s) 1: Nurse notified  Current Medications:   No current facility-administered medications on file prior to encounter. No current outpatient medications on file prior to encounter.      Current Dietary Status:     Regular/thin  History of reflux:  NO    Reflux medication:n/a  Social History/Home Situation:    Home Environment: Apartment  One/Two Story Residence: Other (Comment)(1st floor)  Living Alone: Yes  Support Systems: None  Patient Expects to be Discharged to[de-identified] Apartment  Current DME Used/Available at Home: None  OBJECTIVE:   Respiratory Status:  Room air     CXR Results: No acute cardiopulmonary abnormality  MRI/CT Results: No evidence of acute intracranial abnormality. Oral Motor Structure/Speech:  Oral-Motor Structure/Motor Speech  Labial: No impairment  Dentition: Natural  Oral Hygiene: Adequate  Lingual: No impairment  Velum: No impairment  Mandible: No impairment    Cognitive and Communication Status:  Neurologic State: Alert  Orientation Level: Oriented X4  Cognition: Follows commands  Perception: Appears intact  Perseveration: No perseveration noted  Safety/Judgement: Not assessed    BEDSIDE SWALLOW EVALUATION  Oral Assessment:  Oral Assessment  Labial: No impairment  Dentition: Natural  Oral Hygiene: Adequate  Lingual: No impairment  Velum: No impairment  Mandible: No impairment  P.O. Trials:  Patient Position: Upright in bed. The patient was given the following:   Consistency Presented: Mixed consistency; Thin liquid  How Presented: Self-fed/presented;Cup/sip;Cup/gulp;Straw;Successive swallows    ORAL PHASE:  Bolus Acceptance: No impairment  Bolus Formation/Control: No impairment  Propulsion: No impairment     Oral Residue: None    PHARYNGEAL PHASE:     Laryngeal Elevation: Functional  Aspiration Signs/Symptoms: None  Vocal Quality: No impairment           Pharyngeal Phase Characteristics: No impairment, issues, or problems     OTHER OBSERVATIONS:  Rate/bite size: WNL   Endurance: WNL   Comments:       Tool Used: Dysphagia Outcome and Severity Scale (EDUIN)    Score Comments   Normal Diet  [x] 7 With no strategies or extra time needed   Functional Swallow  [] 6 May have mild oral or pharyngeal delay       Mild Dysphagia    [] 5 Which may require one diet consistency restricted (those who demonstrate penetration which is entirely cleared on MBS would be included)   Mild-Moderate Dysphagia  [] 4 With 1-2 diet consistencies restricted       Moderate Dysphagia  [] 3 With 2 or more diet consistencies restricted       Moderately Severe Dysphagia  [] 2 With partial PO strategies (trials with ST only)       Severe Dysphagia  [] 1 With inability to tolerate any PO safely          Score:  Initial: 7 Most Recent: X (Date: --)   Interpretation of Tool: The Dysphagia Outcome and Severity Scale (EDUIN) is a simple, easy-to-use, 7-point scale developed to systematically rate the functional severity of dysphagia based on objective assessment and make recommendations for diet level, independence level, and type of nutrition. Payor: [place] / [place] / Product Type: Managed Care Medicare /     TREATMENT:    (In addition to Assessment/Re-Assessment sessions the following treatments were rendered)  Assessment/Reassessment only, no treatment provided today  MODALITIES:                                                                    ORAL MOTOR  EXERCISES:                                                                                                                                                                      LARYNGEAL / PHARYNGEAL EXERCISES:                                                                                                                                     __________________________________________________________________________________________________  Safety:   After treatment position/precautions:  · Call light within reach  · RN notified  · Upright in Bed  Treatment Assessment:  Oropharyngeal dysphagia- No symptoms identified. Progression/Medical Necessity:   · No further speech therapy indicated at this time as swallow function is within normal limits. Please re-consult if new concerns arise. Compliance with Program/Exercises: Compliant with evaluation,.   Reason for Continuation of Services/Other Comments:  · No futher skilled speech therapy clinically indicated due to oropharyngeal swallow function WNL    Recommendations/Intent for next treatment session: Evaluation and discharge this date  Time In: 0849  Time Out: [address] [facility] [address], CCC-SLP

## 2019-07-12 NOTE — PROGRESS NOTES
Patient back from MRI, telemetry monitor reapplied and verified with monitor room. Full assessment completed.

## 2019-07-12 NOTE — PROGRESS NOTES
Discharge instructions reviewed with aptient. Prescriptions given for thiamine and mutli vitamin e prescribed and med info sheets provided for all new medications. Opportunity for questions provided. Patient voiced understanding of all discharge instructions.

## 2019-07-12 NOTE — PROGRESS NOTES
Problem: Self Care Deficits Care Plan (Adult)  Goal: *Acute Goals and Plan of Care (Insert Text)  Description  1. Pt will toilet with SBA   2. Pt will complete functional mobility for ADLs with SBA  3. Pt will complete lower body dressing with SBA using AE as needed  4. Pt will complete grooming and hygiene at sink with SBA  5. Pt will demonstrate independence with HEP to promote increased BUE strength and functional use for ADLs  6. Pt will tolerate 23 minutes functional activity with min or fewer rest breaks to promote increased endurance for ADLs      Timeframe: 7 days     Outcome: Progressing Towards Goal     OCCUPATIONAL THERAPY: Initial Assessment 7/12/2019  INPATIENT:    Payor: [place] / [place] [employer] [place] / Product Type: [employer] Medicare /      NAME/AGE/GENDER: Maicol Zepeda is a 62 y.o. female   PRIMARY DIAGNOSIS:  PASQUALE (acute kidney injury) ([facility] [N17.9] <principal problem not specified>   <principal problem not specified>          ICD-10: Treatment Diagnosis:    Generalized Muscle Weakness (M62.81)   Precautions/Allergies:     Patient has no known allergies. ASSESSMENT:     Ms. Vel Farnsworth was admitted with L side weakness; CT and MRI negative for acute process. Pt is known to this therapist from prior admission, has a history of significant ETOH use. Pt reports that she is independent at baseline but that she just got evicted from her apartment and lost all of her stuff. Pt uses a cane for home mobility and a WC for community, endorses multiple recent falls. This session, pt A&O X4 however very emotionally labile and had difficulty focusing on therapist and answering questions. Pt toileted and completed perineal bathing with CGA, completed UB bathing and washed legs with set up, changed gown with set up.  Pt demonstrated full BUE ROM and good coordination and functional strength during ADLs and functional tasks, functional UE results inconsistent with performance observed. Pt stood and transferred from [facility] to bed with CGA, refused further mobility. Pt may benefit from psych consult. Pt appears to be below her functional baseline and would benefit from skilled OT services to address deficits. This section established at most recent assessment   PROBLEM LIST (Impairments causing functional limitations):  Decreased Strength  Decreased ADL/Functional Activities  Decreased Transfer Abilities  Decreased Balance  Increased Pain  Decreased Activity Tolerance  Decreased Cognition   INTERVENTIONS PLANNED: (Benefits and precautions of occupational therapy have been discussed with the patient.)  Activities of daily living training  Adaptive equipment training  Balance training  Cognitive training  Therapeutic activity  Therapeutic exercise     TREATMENT PLAN: Frequency/Duration: Follow patient 2 times/ week to address above goals. Rehabilitation Potential For Stated Goals: [facility] (at time of discharge pending progress):    Placement: It is my opinion, based on this patient's performance to date, that Ms. Ananda Almonte may benefit from participating in 1-2 additional therapy sessions in order to continue to assess for rehab potential and then make recommendation for disposition at discharge. Equipment:   None at this time              OCCUPATIONAL PROFILE AND HISTORY:   History of Present Injury/Illness (Reason for Referral):  See H&P  Past Medical History/Comorbidities:   Ms. Ananda Almonte  has a past medical history of Alcohol abuse (10/31/2015), Alcohol withdrawal ([address] (10/31/2015), Asthma, Other ill-defined conditions(799.89), Other ill-defined conditions(799.89), Psychiatric disorder, and Seizures ([address]  She also has no past medical history of Arthritis, Autoimmune disease ([address] CAD (coronary artery disease), Cancer ([address] Chronic kidney disease, COPD, Dementia, Diabetes ([address] Endocrine disease, Heart failure ([address] Hypertension, Infectious disease, Liver disease, PUD (peptic ulcer disease), Sleep disorder, Stroke ([facility] or Thromboembolus ([facility] Ms. Gwendolyn Walters  has a past surgical history that includes hx gyn; hx orthopaedic; hx heent; and colonoscopy (N/A, 1/2/2019). Social History/Living Environment:   Home Environment: Apartment  One/Two Story Residence: Other (Comment)(1st floor)  Living Alone: Yes  Support Systems: None  Patient Expects to be Discharged to[de-identified] Unknown  Current DME Used/Available at Home: None  Prior Level of Function/Work/Activity:  Independent, lives alone     Number of Personal Factors/Comorbidities that affect the Plan of Care: Expanded review of therapy/medical records (1-2):  MODERATE COMPLEXITY   ASSESSMENT OF OCCUPATIONAL PERFORMANCE[de-identified]   Activities of Daily Living:   Basic ADLs (From Assessment) Complex ADLs (From Assessment)   Feeding: Independent  Oral Facial Hygiene/Grooming: Setup  Bathing: Contact guard assistance  Upper Body Dressing: Setup  Lower Body Dressing: Contact guard assistance  Toileting: Stand by assistance     Grooming/Bathing/Dressing Activities of Daily Living     Cognitive Retraining  Safety/Judgement: Not assessed                       Bed/Mat Mobility  Sit to Stand: Contact guard assistance  Stand to Sit: Contact guard assistance  Bed to Chair: Contact guard assistance     Most Recent Physical Functioning:   Gross Assessment:                  Posture:     Balance:  Sitting: Intact  Standing: Intact; With support Bed Mobility:     Wheelchair Mobility:     Transfers:  Sit to Stand: Contact guard assistance  Stand to Sit: Contact guard assistance  Bed to Chair: Contact guard assistance            Patient Vitals for the past 6 hrs:   BP BP Patient Position SpO2 Pulse   07/12/19 0830 122/72 At rest 96 % (!) 104   07/12/19 1227 120/80 At rest 98 % (!) 102       Mental Status  Neurologic State: Alert  Orientation Level: Oriented X4  Cognition: Follows commands  Perception: Appears intact  Perseveration: Perseverates during conversation  Safety/Judgement: Not assessed                          Physical Skills Involved:  Balance  Strength  Activity Tolerance  Sensation Cognitive Skills Affected (resulting in the inability to perform in a timely and safe manner):  Executive Function  Sustained Attention  Divided Attention Psychosocial Skills Affected:  Habits/Routines  Emotional Regulation  Self-Awareness   Number of elements that affect the Plan of Care: 5+:  HIGH COMPLEXITY   CLINICAL DECISION MAKIN [facility]? ?6 Clicks? Daily Activity Inpatient Short Form  How much help from another person does the patient currently need. .. Total A Lot A Little None   1. Putting on and taking off regular lower body clothing? ? 1   ? 2   ? 3   ? 4   2. Bathing (including washing, rinsing, drying)? ? 1   ? 2   ? 3   ? 4   3. Toileting, which includes using toilet, bedpan or urinal?   ? 1   ? 2   ? 3   ? 4   4. Putting on and taking off regular upper body clothing? ? 1   ? 2   ? 3   ? 4   5. Taking care of personal grooming such as brushing teeth? ? 1   ? 2   ? 3   ? 4   6. Eating meals? ? 1   ? 2   ? 3   ? 4   © 2007, Trustees of [address] [place] [address] [address] [id number], under license to [employer]. All rights reserved      Score:  Initial: 21 Most Recent: X (Date: -- )    Interpretation of Tool:  Represents activities that are increasingly more difficult (i.e. Bed mobility, Transfers, Gait). Medical Necessity:     Patient demonstrates fair   rehab potential due to higher previous functional level. Reason for Services/Other Comments:  Patient continues to require present interventions due to patient's inability to complete ADLs   .    Use of outcome tool(s) and clinical judgement create a POC that gives a: MODERATE COMPLEXITY         TREATMENT:   (In addition to Assessment/Re-Assessment sessions the following treatments were rendered)     Pre-treatment Symptoms/Complaints:    Pain: Initial:   Pain Intensity 1: 8  Pain Location 1: Rib cage  Pain Intervention(s) 1: Nurse notified  Post Session:  8     Assessment/Reassessment only, no treatment provided today    Braces/Orthotics/Lines/Etc:   IV  O2 Device: Room air  Treatment/Session Assessment:    Response to Treatment:  no adverse reaction   Interdisciplinary Collaboration:   Occupational Therapist  Registered Nurse  Certified Nursing Assistant/Patient Care Technician  After treatment position/precautions:   Supine in bed  Bed/Chair-wheels locked  Bed in low position  Call light within reach  RN notified   Compliance with Program/Exercises: Noncompliant some of the time, Will assess as treatment progresses. Recommendations/Intent for next treatment session: \"Next visit will focus on advancements to more challenging activities and reduction in assistance provided\".   Total Treatment Duration:  OT Patient Time In/Time Out  Time In: 1130  Time Out: Mick [address] Julian Constantino

## 2019-07-12 NOTE — DISCHARGE INSTRUCTIONS
Patient Education        Alcohol Detoxification and Withdrawal: Care Instructions  Your Care Instructions    If you drink alcohol regularly and then suddenly stop, you may go through some physical and emotional problems while the alcohol clears out of your system. Clearing the alcohol from your body is called detoxification, or detox. Physical and emotional problems that may happen during detox are called withdrawal.  Symptoms of withdrawal can be scary and dangerous. Mild symptoms include nausea and vomiting, sweating, shakiness, and intense worry. Severe symptoms include being confused and irritable, feeling things on your body that are not there, seeing or hearing things that are not there, and trembling. You may even have seizures. If your symptoms become severe you must see a doctor. People who drink large amounts of alcohol should not try to detox at home. A person can die of severe alcohol withdrawal.  Symptoms of alcohol withdrawal may begin from 4 to 12 hours after you stop drinking. But they may not start for several days after the last drink. They can last a few days. It is hard to stop drinking. But when you have cleared the alcohol from your system, you will be able to start the next part of your life, free from the burden of being dependent. Follow-up care is a key part of your treatment and safety. Be sure to make and go to all appointments, and call your doctor if you are having problems. It's also a good idea to know your test results and keep a list of the medicines you take. How can you care for yourself at home? · Before you stop drinking, talk to your doctor about how you plan to stop. Be sure to be completely honest with him or her about how much you have been drinking. Your doctor will figure out whether you need to detox in a supervised medical center. · Take your medicines exactly as prescribed. Call your doctor if you think you are having a problem with your medicine.   · Make sure someone you trust is with you the whole time. Have friends and family members take turns staying with you until you are done with detox. · Put a list of emergency numbers near the phone. This should include your doctor, the police, the nearest hospital and emergency room, and neighbors who can help if needed. · Make sure all alcohol is removed from the house before you start. This includes beverages as well as medicines, rubbing alcohol, and certain flavorings like vanilla extract. · Keep \"drinking buddies\" away during the time you are going through detox. · Make your surroundings calm. Soft lights, soft music, and a comfortable place to sit or lie down can help make the process easier. · Drink lots of fluids and eat snacks such as fruit, cheese and crackers, and pretzels. Foods high in carbohydrate may help reduce the craving for alcohol. · Understand that detox is going to be hard. · Keep in mind that the people watching over you during detox are there to help. Explain to them before you start that you may not act like yourself until detox is finished. · Consider joining a support group such as Alcoholics Anonymous. Sharing your experiences with other people who face similar challenges may help you feel less overwhelmed. · Keep the numbers for these national suicide hotlines: 9-992-206-TALK (7-957.559.3573) and 5-505-PPELSYA (1-149.881.2522). If you or someone you know talks about suicide or feeling hopeless, get help right away. When should you call for help? Call 911 anytime you think you may need emergency care.  For example, call if:    · You feel you cannot stop from hurting yourself or someone else.     · You vomit many times and cannot stop.     · You vomit blood or what looks like coffee grounds.     · You have trouble breathing or are breathing very fast.     · Your heart beats more than 120 times a minute and will not slow down.     · You have chest pain.     · You have a seizure.     · You see or feel things that are not there (hallucinate).    If you are caring for someone who is going through detox, call if:    · The person passes out (loses consciousness).     · The person sees or feels things that are not there and sees or hears the same things many times.     · The person is very agitated and will not calm down.     · The person becomes violent or threatens to be violent.     · The person has a seizure.    Call your doctor now or seek immediate medical care if:    · You have a high fever.     · You have severe belly pain.     · You are very shaky.    Watch closely for changes in your health, and be sure to contact your doctor if:    · You do not get better as expected. Where can you learn more? Go to http://sanchez-mila.info/. Enter [id number] in the search box to learn more about \"Alcohol Detoxification and Withdrawal: Care Instructions. \"  Current as of: May 7, 2018  Content Version: 11.9  © 7118-4630 [employer]. Care instructions adapted under license by [employer] (which disclaims liability or warranty for this information). If you have questions about a medical condition or this instruction, always ask your healthcare professional. [address] any warranty or liability for your use of this information. Stroke: After Your Visit     Your Care Instructions     You have had a stroke. Risk factors for stroke include being overweight, smoking, and sedentary lifestyle. This means that the blood flow to a part of your brain was blocked for some time, which damages the nerve cells in that part of the brain. The part of your body controlled by that part of your brain may not function properly now. The brain is an amazing organ that can heal itself to some degree. The stroke you had damaged part of your brain, but other parts of your brain may take over in some way for the damaged areas.  You have already started this process. Going home may be hard for you and your family. The more you can try to do for yourself, the better. Remember to take each day one at a time. Follow-up care is a key part of your treatment and safety. Be sure to make and go to all appointments, and call your doctor if you are having problems. Its also a good idea to know your test results and keep a list of the medicines you take. How can you care for yourself at home? Enter a stroke rehabilitation (rehab) program, if your doctor recommends it. Physical, speech, and occupational therapies can help you manage bathing, dressing, eating, and other basics of daily living. Eat a heart-healthy diet that is low in cholesterol, saturated fat, and salt. Eat lots of fresh fruits and vegetables and foods high in fiber. Increase your activities slowly. Take short rest breaks when you get tired. Gradually increase the amount you walk. Start out by walking a little more than you did the day before. Do not drive until your doctor says it is okay. It is normal to feel sad or depressed after a stroke. If the blues last, talk to your doctor. If you are having problems with urine leakage, go to the bathroom at regular times, including when you first wake up and at bedtime. Also, limit fluids after dinner. If you are constipated, drink plenty of fluids, enough so that your urine is light yellow or clear like water. If you have kidney, heart, or liver disease and have to limit fluids, talk with your doctor before you increase the amount of fluids you drink. Set up a regular time for using the toilet. If you continue to have constipation, your doctor may suggest using a bulking agent, such as Metamucil, or a stool softener, laxative, or enema. Medicines  Take your medicines exactly as prescribed. Call your doctor if you think you are having a problem with your medicine. You may be taking several medicines.  ACE (angiotensin-converting enzyme) inhibitors, angiotensin II receptor blockers (ARBs), beta-blockers, diuretics (water pills), and calcium channel blockers control your blood pressure. Statins help lower cholesterol. Your doctor may also prescribe medicines for depression, pain, sleep problems, anxiety, or agitation. If your doctor has given you medicine that prevents blood clots, such as warfarin (Coumadin), aspirin combined with extended-release dipyridamole (Aggrenox), clopidogrel (Plavix), or aspirin to prevent another stroke, you should:  Tell your dentist, pharmacist, and other health professionals that you take these medicines. Watch for unusual bruising or bleeding, such as blood in your urine, red or black stools, or bleeding from your nose or gums. Get regular blood tests to check your clotting time if you are taking Coumadin. Wear medical alert jewelry that says you take blood thinners. You can buy this at most [employer]. Do not take any over-the-counter medicines or herbal products without talking to your doctor first.  If you take birth control pills or hormone replacement therapy, talk to your doctor about whether they are right for you. For family members and caregivers  Make the home safe. Set up a room so that your loved one does not have to climb stairs. Be sure the bathroom is on the same floor. Move throw rugs and furniture that could cause falls, and make sure that the lighting is good. Put grab bars and seats in tubs and showers. Find out what your loved one can do and what he or she needs help with. Try not to do things for your loved one that your loved one can do on his or her own. Help him or her learn and practice new skills. Visit and talk with your loved one often. Try doing activities together that you both enjoy, such as playing cards or board games. Keep in touch with your loved one's friends as much as you can, and encourage them to visit. Take care of yourself. Do not try to do everything yourself.  Ask other family members to help. Eat well, get enough rest, and take time to do things that you enjoy. Keep up with your own doctor visits, and make sure to take your medicines regularly. Get out of the house as much as you can. Join a local support group. Find out if you qualify for home health care visits to help with rehab or for adult day care. When should you call for help? Call 911 anytime you think you may need emergency care. For example, call if:  You have signs of another stroke. These may include:  Sudden numbness, paralysis, or weakness in your face, arm, or leg, especially on only one side of your body. New problems with walking or balance. Sudden vision changes. Drooling or slurred speech. New problems speaking or understanding simple statements, or you feel confused. A sudden, severe headache that is different from past headaches. Call 911 even if these symptoms go away in a few minutes. You cough up blood. You vomit blood or what looks like coffee grounds. You pass maroon or very bloody stools. Call your doctor now or seek immediate medical care if:  You have new bruises or blood spots under your skin. You have a nosebleed. Your gums bleed when you brush your teeth. You have blood in your urine. Your stools are black and tarlike or have streaks of blood. You have vaginal bleeding when you are not having your period, or heavy period bleeding. You have new symptoms that may be related to your stroke, such as falls or trouble swallowing. Watch closely for changes in your health, and be sure to contact your doctor if you have any problems. Where can you learn more? Go to [employer]    Enter C294  in the search box to learn more about \"Stroke: After Your Visit\". © 2002-0622 [employer]. Care instructions adapted under license by [employer]and[employer] (which disclaims liability or warranty for this information).  This care instruction is for use with your licensed healthcare professional. If you have questions about a medical condition or this instruction, always ask your healthcare professional. Sofia Jessica any warranty or liability for your use of this information.

## 2019-07-12 NOTE — PROGRESS NOTES
Care Management Interventions  PCP Verified by CM: No(Provided patient with information on [facility])  Palliative Care Criteria Met (RRAT>21 & CHF Dx)?: No(RRAT 9 Dx PASQUALE )  Mode of Transport at Discharge: Other (see comment)(taxi cab with yellow cab)  Transition of Care Consult (CM Consult): Discharge Planning  Discharge Durable Medical Equipment: No  Physical Therapy Consult: Yes  Occupational Therapy Consult: Yes  Speech Therapy Consult: No  Current Support Network: Other(going to stay with a friend stated was homeless for 3 days)  Confirm Follow Up Transport: Other (see comment)(cabs or friends)  Plan discussed with Pt/Family/Caregiver: Yes  Freedom of Choice Offered: Yes  Discharge Location  Discharge Placement: Home  Patient states she needs to discharged now as she has to get some money transferred ASAP. She wants a cab to go home. She states she was without a place to stay for 3 days but does not go into details and is focused on \"getting out of here now\". Explained will need for nursing to go over d/c information with her first and would assist with cab ride. She states she is going to [address] [place] [address] to stay with a friend. She does not have PCP and was given information on [facility] for follow up. She was not receptive to any follow up stated \"I need to leave and want clothes and my bag\".

## 2019-07-12 NOTE — DISCHARGE SUMMARY
Hospitalist Discharge Summary     Admit Date:  2019 10:20 AM   Name:  Yasmine Siddiqi   Age:  62 y.o.  :  1955   MRN:  [id number]   PCP:  None  Treatment Team: Attending Provider: Jose Carolina MD; Care Manager: Jennifer Patel; Occupational Therapist: Ramon Melgar OT; Physical Therapist: Ortega Villeda, PT, DPT; Utilization Review: Nemesio Mcallister    Problem List for this Hospitalization:  Hospital Problems as of 2019 Date Reviewed: 2019          Codes Class Noted - Resolved POA    [place] (acute kidney injury) [facility]) ICD-10-CM: N17.9  ICD-9-CM: 584.9  2019 - Present Unknown                Admission HPI from 2019:    62year old female seen as a code S with left sided numbness. The patient initially presented with generalized body aches and weakness after binge drinking for several days. She then reported left sided numbness. The onset of these symptoms was unclear and a definitive last known normal was unable to be established. CT of the head was obtained and was negative for acute abnormality . CTA of head neck was not obtained due to low NIHSS. MRI of brain negative for acute intracranial abnormality. TTE negative for PFO or atrial enlargement. Stroke work up negative. No further neurological work up required. Hospital Course:  Patient alert and oriented x3. Neuro work up benign. Patient states needing to leave to get a deposit back and needs to leave in 2 hours. Renal function improved. No N/V/D. Received Banana Bag in ED yesterday and oral vitamins. Only slight tremors noted this afternoon. Patient states having a place to go. Will have case management provide information for outpatient ETOH assistance. Follow up instructions and discharge meds at bottom of this note. Plan was discussed with patient. All questions answered. Patient was stable at time of discharge. 10 systems reviewed and negative except as noted in HPI.     Diagnostic Imaging/Tests:   Mri Brain Wo Cont    Result Date: 7/12/2019  MRI of the brain INDICATION: Left side extremity weakness Standard MRI sequences were obtained through the brain in multiple planes without IV contrast FINDINGS: Slight increased signal in the occipital white matter bilaterally, likely chronic change. .  There is no evidence of acute hemorrhage or infarction. The ventricles are normal in size. There are no extra-axial fluid collections. There are no intracranial masses. There are no bony lesions. The sinuses are clear. IMPRESSION: No evidence of acute intracranial abnormality     Ct Head Wo Cont    Result Date: 7/11/2019  CT of the head without contrast. CLINICAL INDICATION: Left-sided weakness that started one hour ago, code stroke PROCEDURE: Serial thin section axial images are obtained from the cranial vertex through the skull base without the administration of intravenous contrast. Radiation dose reduction techniques were used for this study. Our CT scanners use one or all of the following: Automated exposure control, adjusted of the mA and/or kV according to patient size, iterative reconstruction COMPARISON: Head CT dated 3/17/2019. FINDINGS: There is no acute intracranial hemorrhage, mass, or mass effect. No abnormal extra-axial fluid collections identified. There is no hydrocephalus. The basilar cisterns are widely patent. The gray-white matter brain parenchymal interface is well-maintained. No skull fracture or aggressive osseous lesion noted. The mastoid air cells and included paranasal sinuses are clear. IMPRESSION: No acute intracranial abnormality. Xr Chest Port    Result Date: 7/11/2019  Portable chest x-ray CLINICAL INDICATION: Leukocytosis, altered mental status FINDINGS: Single AP view of the chest compared to a similar exam dated 3/17/2019 show the lungs to be expanded and clear. No pleural effusion or pneumothorax. The cardiac silhouette and mediastinum are unremarkable. Mild dextroscoliosis is present. IMPRESSION: No acute cardiopulmonary abnormality. Echocardiogram results:  Results for orders placed or performed during the hospital encounter of 19   2D ECHO COMPLETE ADULT (TTE) [address] [facility]  [address] [facility], [address]  [phone]    Transthoracic Echocardiogram  2D, M-mode, Doppler, and Color Doppler    Patient: Chago Mahoney  MR #: [id number]  :   Age: 62 years  Gender: Female  Study date: 2019  Account #: [de-identified]  Height: 65 in  Weight: 113.7 lb  BSA: 1.56 mï¾²  Status:Routine  Location: 325  BP: 149/ 89    Allergies: NO KNOWN ALLERGENS    Sonographer:  Dajuan Tan RDCS  Group:  [id number] [facility]  Referring Physician:  Emilie John MD  Reading Physician:  Red Escobar MD [facility]    INDICATIONS: Stroke Protocol. *Patient crying and talking throughout study. Patient turned completely onto  left side and unable to lean back due to broken ribs (per patient). *    PROCEDURE: This was a routine study. A transthoracic echocardiogram was  performed. The study included complete 2D imaging, M-mode, complete spectral  Doppler, and color Doppler. Intravenous contrast (agitated saline) was  administered. Echocardiographic views were limited by restricted patient  mobility, poor patient compliance, and poor acoustic window availability. Image  quality was adequate. LEFT VENTRICLE: Size was normal. Systolic function was normal. Ejection  fraction was estimated in the range of 65 % to 70 %. There were no regional  wall motion abnormalities. Wall thickness was normal. The E/e' ratio was   8.35. Left ventricular diastolic function parameters were normal.    RIGHT VENTRICLE: The size was normal. Systolic function was normal. The  tricuspid jet envelope definition was inadequate for estimation of RV   systolic  pressure.     LEFT ATRIUM: Size was normal.    ATRIAL SEPTUM: Contrast injection was performed. There was no right-to-left  shunt, with provocative maneuvers to increase right atrial pressure. RIGHT ATRIUM: Size was normal.    SYSTEMIC VEINS: IVC: The inferior vena cava was normal in size and course. AORTIC VALVE: The valve was structurally normal, tri-commissural. There was   no  evidence for stenosis. There was no insufficiency. MITRAL VALVE: Valve structure was normal. There was no evidence for stenosis. There was trivial regurgitation. TRICUSPID VALVE: The valve structure was normal. There was no evidence for  stenosis. There was no regurgitation. PULMONIC VALVE: Not well visualized. There was no evidence for stenosis. There  was no insufficiency. PERICARDIUM: There was no pericardial effusion. AORTA: The root exhibited normal size. SUMMARY:    -  Left ventricle: Systolic function was normal. Ejection fraction was  estimated in the range of 65 % to 70 %. There were no regional wall motion  abnormalities. -  Atrial septum: Contrast injection was performed. There was no   right-to-left  shunt, with provocative maneuvers to increase right atrial pressure. SYSTEM MEASUREMENT TABLES    2D mode  AoR Diam (2D): 3.1 cm  Left Atrium Systolic Volume Index; Method of Disks, Biplane; 2D mode;: 16.2  ml/m2  IVS/LVPW (2D): 1  IVSd (2D): 1.1 cm  LVIDd (2D): 3.7 cm  LVOT Area (2D): 2.5 cm2  LVPWd (2D): 1.1 cm  RVIDd (2D): 3 cm    Tissue Doppler Imaging  LV Peak Early Jiang Tissue Khadar; Lateral MA (TDI): 9.9 cm/s  LV Peak Early Jiang Tissue Khadar; Medial MA (TDI): 10.5 cm/s    Unspecified Scan Mode  Peak Grad; Mean; Antegrade Flow: 14 mm[Hg]  Vmax;  Antegrade Flow: 186 cm/s  LVOT Diam: 1.8 cm  MV Peak Khadar/LV Peak Tissue Khadar E-Wave; Lateral MA: 8.6  MV Peak Khadar/LV Peak Tissue Khadar E-Wave; Medial MA: 8.1    Prepared and signed by    Jama Kellogg MD [facility]  Signed 12-Jul-2019 11:37:02           All Micro Results     Procedure Component Value Units Date/Time    CULTURE, BLOOD [[id number]] Collected:  07/11/19 1132    Order Status:  Completed Specimen:  Blood Updated:  07/12/19 1235     Special Requests: --        RIGHT  Antecubital       Culture result: NO GROWTH 1 DAY       CULTURE, BLOOD [[id number]] Collected:  07/11/19 1146    Order Status:  Completed Specimen:  Blood Updated:  07/12/19 1235     Special Requests: --        LEFT  Antecubital       Culture result: NO GROWTH 1 DAY       CULTURE, URINE [[id number]] Collected:  07/11/19 1330    Order Status:  Completed Specimen:  Cath Urine Updated:  07/12/19 0521     Special Requests: NO SPECIAL REQUESTS        Culture result: NO GROWTH 1 DAY             Labs: Results:       BMP, Mg, Phos Recent Labs     07/12/19  0408 07/11/19  1402 07/11/19  1132     --  136   K 3.7 3.1* 3.1*   *  --  98   CO2 26  --  27   AGAP 7  --  11   BUN 20  --  28*   CREA 1.29*  --  2.02*   CA 7.9*  --  9.1   *  --  106*   MG 2.0  --  2.7*   PHOS 1.6* 3.8*  --       CBC Recent Labs     07/12/19  0408 07/11/19  1034   WBC 10.0 22.3*   RBC 3.74* 5.27*   HGB 11.2* 15.7*   HCT 33.3* 45.4    456*   GRANS 77 88*   LYMPH 15 4*   EOS 0* 0*   MONOS 7 7   BASOS 0 0   IG 1 1   ANEU 7.7 19.6*   ABL 1.5 0.9   JN 0.0 0.0   ABM 0.7 1.6*   ABB 0.0 0.1   AIG 0.1 0.1      LFT Recent Labs     07/12/19  0408 07/11/19  1132   SGOT 53* 93*   ALT 14 18    159*   TP 5.4* 7.5   ALB 2.6* 3.8   GLOB 2.8 3.7*   AGRAT 0.9* 1.0*      Cardiac Testing Lab Results   Component Value Date/Time    CK 1,634 (H) 07/11/2019 07:52 PM    CK 2,267 (H) 07/11/2019 02:02 PM    CK 90 03/17/2019 01:17 AM      Coagulation Tests Lab Results   Component Value Date/Time    Prothrombin time 13.2 01/01/2019 05:59 AM    Prothrombin time 11.2 05/29/2017 07:00 AM    INR 1.0 01/01/2019 05:59 AM    INR 1.0 05/29/2017 07:00 AM    INR (POC) 1.1 07/11/2019 10:34 AM    aPTT 28.3 05/29/2017 07:00 AM      A1c No results found for: HBA1C, HGBE8, CUC4QIFV   Lipid Panel Lab Results   Component Value Date/Time    Cholesterol, total 143 07/12/2019 04:08 AM    HDL Cholesterol 81 (H) 07/12/2019 04:08 AM    LDL, calculated 42.8 07/12/2019 04:08 AM    VLDL, calculated 19.2 07/12/2019 04:08 AM    Triglyceride 96 07/12/2019 04:08 AM    CHOL/HDL Ratio 1.8 07/12/2019 04:08 AM      Thyroid Panel Lab Results   Component Value Date/Time    TSH 0.260 (L) 07/12/2019 04:08 AM    TSH 1.970 01/02/2019 01:35 PM        Most Recent UA Lab Results   Component Value Date/Time    Color YELLOW 07/11/2019 02:14 PM    Appearance CLEAR 07/11/2019 02:14 PM    Specific gravity 1.026 (H) 12/30/2018 02:23 PM    pH (UA) 6.0 07/11/2019 02:14 PM    Protein NEGATIVE  07/11/2019 02:14 PM    Glucose NEGATIVE  07/11/2019 02:14 PM    Ketone NEGATIVE  07/11/2019 02:14 PM    Bilirubin NEGATIVE  07/11/2019 02:14 PM    Blood SMALL (A) 07/11/2019 02:14 PM    Urobilinogen 0.2 07/11/2019 02:14 PM    Nitrites NEGATIVE  07/11/2019 02:14 PM    Leukocyte Esterase NEGATIVE  07/11/2019 02:14 PM        No Known Allergies  Immunization History   Administered Date(s) Administered    (RETIRED) Pneumococcal Vaccine (Unspecified Type) 02/06/2007    Influenza Vaccine 10/23/2013, 07/01/2014, 10/06/2017    Influenza Vaccine Whole 02/06/2007    TB Skin Test (PPD) Intradermal 10/31/2015, 09/10/2017, 12/31/2018    TD Vaccine 02/06/2004       All Labs from Last 24 Hrs:  Recent Results (from the past 24 hour(s))   DRUG SCREEN, URINE    Collection Time: 07/11/19  1:09 PM   Result Value Ref Range    PCP(PHENCYCLIDINE) NEGATIVE       BENZODIAZEPINES NEGATIVE       COCAINE NEGATIVE       AMPHETAMINES NEGATIVE       METHADONE NEGATIVE       THC (TH-CANNABINOL) NEGATIVE       OPIATES NEGATIVE       BARBITURATES NEGATIVE      URINE MICROSCOPIC    Collection Time: 07/11/19  1:09 PM   Result Value Ref Range    WBC 0 0 /hpf    RBC 0 0 /hpf    Epithelial cells 0 0 /hpf    Bacteria 0 0 /hpf    Casts HYALINE 0 /lpf    Crystals, urine 0 0 /LPF    Mucus 0 0 /lpf   GLUCOSE, POC    Collection Time: 07/11/19  1:11 PM   Result Value Ref Range    Glucose (POC) 94 65 - 100 mg/dL   CULTURE, URINE    Collection Time: 07/11/19  1:30 PM   Result Value Ref Range    Special Requests: NO SPECIAL REQUESTS      Culture result: NO GROWTH 1 DAY     POTASSIUM    Collection Time: 07/11/19  2:02 PM   Result Value Ref Range    Potassium 3.1 (L) 3.5 - 5.1 mmol/L   CK    Collection Time: 07/11/19  2:02 PM   Result Value Ref Range    CK 2,267 (H) 21 - 215 U/L   LIPASE    Collection Time: 07/11/19  2:02 PM   Result Value Ref Range    Lipase 180 73 - 393 U/L   PROCALCITONIN    Collection Time: 07/11/19  2:02 PM   Result Value Ref Range    Procalcitonin 0.1 ng/mL   PHOSPHORUS    Collection Time: 07/11/19  2:02 PM   Result Value Ref Range    Phosphorus 3.8 (H) 2.3 - 3.7 MG/DL   POC LACTIC ACID    Collection Time: 07/11/19  2:09 PM   Result Value Ref Range    Lactic Acid (POC) 1.26 0.5 - 1.9 mmol/L   URINALYSIS W/ RFLX MICROSCOPIC    Collection Time: 07/11/19  2:14 PM   Result Value Ref Range    Color YELLOW      Appearance CLEAR      Specific gravity 1.015 1.001 - 1.023      pH (UA) 6.0 5.0 - 9.0      Protein NEGATIVE  NEG mg/dL    Glucose NEGATIVE  NEG mg/dL    Ketone NEGATIVE  NEG mg/dL    Bilirubin NEGATIVE  NEG      Blood SMALL (A) NEG      Urobilinogen 0.2 0.2 - 1.0 EU/dL    Nitrites NEGATIVE  NEG      Leukocyte Esterase NEGATIVE  NEG     URINE MICROSCOPIC    Collection Time: 07/11/19  2:14 PM   Result Value Ref Range    WBC 0 0 /hpf    RBC 0-3 0 /hpf    Epithelial cells 0 0 /hpf    Bacteria 0 0 /hpf    Casts HYALINE 0 /lpf    Crystals, urine 0 0 /LPF    Amorphous Crystals TRACE 0      Mucus 0 0 /lpf   EKG, 12 LEAD, INITIAL    Collection Time: 07/11/19  3:08 PM   Result Value Ref Range    Ventricular Rate 104 BPM    Atrial Rate 104 BPM    P-R Interval 134 ms    QRS Duration 88 ms    Q-T Interval 348 ms    QTC Calculation (Bezet) 457 ms    Calculated P Axis 78 degrees    Calculated R Axis 77 degrees    Calculated T Axis 54 degrees    Diagnosis       Sinus tachycardia  Otherwise normal ECG  When compared with ECG of 17-MAR-2019 01:35,  No significant change was found  Confirmed by [facility]  MD ()HAZEL ([id number]) on 7/11/2019 3:26:49 PM     CK    Collection Time: 07/11/19  7:52 PM   Result Value Ref Range    CK 1,634 (H) 21 - 215 U/L   CBC WITH AUTOMATED DIFF    Collection Time: 07/12/19  4:08 AM   Result Value Ref Range    WBC 10.0 4.3 - 11.1 K/uL    RBC 3.74 (L) 4.05 - 5.2 M/uL    HGB 11.2 (L) 11.7 - 15.4 g/dL    HCT 33.3 (L) 35.8 - 46.3 %    MCV 89.0 79.6 - 97.8 FL    MCH 29.9 26.1 - 32.9 PG    MCHC 33.6 31.4 - 35.0 g/dL    RDW 13.7 11.9 - 14.6 %    PLATELET 536 331 - 090 K/uL    MPV 9.3 (L) 9.4 - 12.3 FL    ABSOLUTE NRBC 0.00 0.0 - 0.2 K/uL    DF AUTOMATED      NEUTROPHILS 77 43 - 78 %    LYMPHOCYTES 15 13 - 44 %    MONOCYTES 7 4.0 - 12.0 %    EOSINOPHILS 0 (L) 0.5 - 7.8 %    BASOPHILS 0 0.0 - 2.0 %    IMMATURE GRANULOCYTES 1 0.0 - 5.0 %    ABS. NEUTROPHILS 7.7 1.7 - 8.2 K/UL    ABS. LYMPHOCYTES 1.5 0.5 - 4.6 K/UL    ABS. MONOCYTES 0.7 0.1 - 1.3 K/UL    ABS. EOSINOPHILS 0.0 0.0 - 0.8 K/UL    ABS. BASOPHILS 0.0 0.0 - 0.2 K/UL    ABS. IMM. GRANS. 0.1 0.0 - 0.5 K/UL   METABOLIC PANEL, COMPREHENSIVE    Collection Time: 07/12/19  4:08 AM   Result Value Ref Range    Sodium 141 136 - 145 mmol/L    Potassium 3.7 3.5 - 5.1 mmol/L    Chloride 108 (H) 98 - 107 mmol/L    CO2 26 21 - 32 mmol/L    Anion gap 7 7 - 16 mmol/L    Glucose 122 (H) 65 - 100 mg/dL    BUN 20 8 - 23 MG/DL    Creatinine 1.29 (H) 0.6 - 1.0 MG/DL    GFR est AA 54 (L) >60 ml/min/1.73m2    GFR est non-AA 44 (L) >60 ml/min/1.73m2    Calcium 7.9 (L) 8.3 - 10.4 MG/DL    Bilirubin, total 0.4 0.2 - 1.1 MG/DL    ALT (SGPT) 14 12 - 65 U/L    AST (SGOT) 53 (H) 15 - 37 U/L    Alk.  phosphatase 120 50 - 136 U/L    Protein, total 5.4 (L) 6.3 - 8.2 g/dL    Albumin 2.6 (L) 3.2 - 4.6 g/dL    Globulin 2.8 2.3 - 3.5 g/dL A-G Ratio 0.9 (L) 1.2 - 3.5     TSH 3RD GENERATION    Collection Time: 07/12/19  4:08 AM   Result Value Ref Range    TSH 0.260 (L) 0.358 - 3.740 uIU/mL   VITAMIN B12    Collection Time: 07/12/19  4:08 AM   Result Value Ref Range    Vitamin B12 189 (L) 193 - 986 pg/mL   LIPID PANEL    Collection Time: 07/12/19  4:08 AM   Result Value Ref Range    LIPID PROFILE          Cholesterol, total 143 <200 MG/DL    Triglyceride 96 35 - 150 MG/DL    HDL Cholesterol 81 (H) 40 - 60 MG/DL    LDL, calculated 42.8 <100 MG/DL    VLDL, calculated 19.2 6.0 - 23.0 MG/DL    CHOL/HDL Ratio 1.8     MAGNESIUM    Collection Time: 07/12/19  4:08 AM   Result Value Ref Range    Magnesium 2.0 1.8 - 2.4 mg/dL   PHOSPHORUS    Collection Time: 07/12/19  4:08 AM   Result Value Ref Range    Phosphorus 1.6 (L) 2.3 - 3.7 MG/DL       Discharge Exam:  Patient Vitals for the past 24 hrs:   Temp Pulse Resp BP SpO2   07/12/19 1227 98.4 °F (36.9 °C) (!) 102 17 120/80 98 %   07/12/19 0830 98.2 °F (36.8 °C) (!) 104 16 122/72 96 %   07/12/19 0501 98.9 °F (37.2 °C) (!) 118 19 122/71 99 %   07/12/19 0058 98.3 °F (36.8 °C) (!) 124 19 116/75 100 %   07/11/19 2135 98.9 °F (37.2 °C) (!) 137 20 114/67 97 %   07/11/19 1749 100.4 °F (38 °C) (!) 137 21 132/72 90 %   07/11/19 1502  (!) 107      07/11/19 1431 97.8 °F (36.6 °C) (!) 101 18 149/89 98 %   07/11/19 1347  92 16 149/87 97 %   07/11/19 1258 97.9 °F (36.6 °C) (!) 108  140/87 97 %     Oxygen Therapy  O2 Sat (%): 98 % (07/12/19 1227)  Pulse via Oximetry: 92 beats per minute (07/11/19 1347)  O2 Device: Room air (07/12/19 0330)    Intake/Output Summary (Last 24 hours) at 7/12/2019 1257  Last data filed at 7/12/2019 0532  Gross per 24 hour   Intake 2684 ml   Output 2500 ml   Net 184 ml         Physical exam:  General:    Well nourished. Alert. No distress. Eyes:   Normal sclera. Extraocular movements intact. ENT:  Normocephalic, atraumatic. Moist mucous membranes  CV:   Regular rate and rhythm.   No murmur, rub, or gallop. Lungs:  Clear to auscultation bilaterally. No wheezing, rhonchi, or rales. Abdomen: Soft, nontender, nondistended. Bowel sounds normal.   Extremities: Warm and dry. No cyanosis or edema. Neurologic: No focal deficits  Skin:     No rashes or jaundice. Psych:  Normal mood and affect. Discharge Info:   Current Discharge Medication List      START taking these medications    Details   thiamine (B-1) 50 mg tablet Take 1 Tab by mouth daily for 30 days. Qty: 30 Tab, Refills: 0      multivitamin (ONE A DAY) tablet Take 1 Tab by mouth daily for 30 days. Qty: 30 Tab, Refills: 0         CONTINUE these medications which have CHANGED    Details   pantoprazole (PROTONIX) 40 mg tablet Take 1 Tab by mouth Daily (before breakfast) for 30 days. Indications: while taking high dose ibuprofen for rib fractures  Qty: 30 Tab, Refills: 0               Disposition: home    Activity: Activity as tolerated  Diet: DIET CARDIAC Regular  DIET NUTRITIONAL SUPPLEMENTS All Meals; Ensure Enlive    Follow-up Appointments   Procedures    FOLLOW UP VISIT Appointment in: 3 - 5 Days PCP     PCP     Standing Status:   Standing     Number of Occurrences:   1     Order Specific Question:   Appointment in     Answer:   3 - 5 Days         Follow-up Information     Follow up With Specialties Details Why Contact Info    None    None ([phone]) Patient stated that they have no PCP          PCP as needed. Case reviewed with supervising physician - Dr. Hola Sweet    Time spent in patient discharge planning and coordination 35 minutes.     Signed:  WILLY Gifford

## 2019-07-13 LAB
BACTERIA SPEC CULT: NORMAL
SERVICE CMNT-IMP: NORMAL

## 2019-07-16 ENCOUNTER — HOSPITAL ENCOUNTER (EMERGENCY)
Age: 64
Discharge: HOME | End: 2019-07-16
Attending: EMERGENCY MEDICINE
Payer: MEDICARE

## 2019-07-16 ENCOUNTER — APPOINTMENT (OUTPATIENT)
Dept: ULTRASOUND IMAGING | Age: 64
End: 2019-07-16
Attending: EMERGENCY MEDICINE
Payer: MEDICARE

## 2019-07-16 ENCOUNTER — APPOINTMENT (OUTPATIENT)
Dept: CT IMAGING | Age: 64
End: 2019-07-16
Attending: EMERGENCY MEDICINE
Payer: MEDICARE

## 2019-07-16 VITALS
OXYGEN SATURATION: 98 % | TEMPERATURE: 98.8 F | DIASTOLIC BLOOD PRESSURE: 80 MMHG | SYSTOLIC BLOOD PRESSURE: 172 MMHG | HEART RATE: 93 BPM | BODY MASS INDEX: 18.99 KG/M2 | RESPIRATION RATE: 16 BRPM | WEIGHT: 114 LBS | HEIGHT: 65 IN

## 2019-07-16 DIAGNOSIS — R20.0 NUMBNESS AND TINGLING: Primary | ICD-10-CM

## 2019-07-16 DIAGNOSIS — R60.9 DEPENDENT EDEMA: ICD-10-CM

## 2019-07-16 DIAGNOSIS — E53.8 B12 DEFICIENCY: ICD-10-CM

## 2019-07-16 DIAGNOSIS — R20.2 NUMBNESS AND TINGLING: Primary | ICD-10-CM

## 2019-07-16 LAB
ALBUMIN SERPL-MCNC: 2.9 G/DL (ref 3.2–4.6)
ALBUMIN/GLOB SERPL: 0.9 {RATIO} (ref 1.2–3.5)
ALP SERPL-CCNC: 136 U/L (ref 50–136)
ALT SERPL-CCNC: 15 U/L (ref 12–65)
ANION GAP SERPL CALC-SCNC: 5 MMOL/L (ref 7–16)
AST SERPL-CCNC: 29 U/L (ref 15–37)
ATRIAL RATE: 87 BPM
BASOPHILS # BLD: 0 K/UL (ref 0–0.2)
BASOPHILS NFR BLD: 0 % (ref 0–2)
BILIRUB SERPL-MCNC: 0.1 MG/DL (ref 0.2–1.1)
BNP SERPL-MCNC: 8 PG/ML
BUN SERPL-MCNC: 12 MG/DL (ref 8–23)
CALCIUM SERPL-MCNC: 8.5 MG/DL (ref 8.3–10.4)
CALCULATED P AXIS, ECG09: 81 DEGREES
CALCULATED R AXIS, ECG10: 81 DEGREES
CALCULATED T AXIS, ECG11: 83 DEGREES
CHLORIDE SERPL-SCNC: 109 MMOL/L (ref 98–107)
CK SERPL-CCNC: 135 U/L (ref 21–215)
CO2 SERPL-SCNC: 30 MMOL/L (ref 21–32)
CREAT SERPL-MCNC: 0.46 MG/DL (ref 0.6–1)
DIAGNOSIS, 93000: NORMAL
DIFFERENTIAL METHOD BLD: ABNORMAL
EOSINOPHIL # BLD: 0.4 K/UL (ref 0–0.8)
EOSINOPHIL NFR BLD: 7 % (ref 0.5–7.8)
ERYTHROCYTE [DISTWIDTH] IN BLOOD BY AUTOMATED COUNT: 13.3 % (ref 11.9–14.6)
GLOBULIN SER CALC-MCNC: 3.2 G/DL (ref 2.3–3.5)
GLUCOSE SERPL-MCNC: 102 MG/DL (ref 65–100)
HCT VFR BLD AUTO: 33.2 % (ref 35.8–46.3)
HGB BLD-MCNC: 11 G/DL (ref 11.7–15.4)
IMM GRANULOCYTES # BLD AUTO: 0 K/UL (ref 0–0.5)
IMM GRANULOCYTES NFR BLD AUTO: 0 % (ref 0–5)
INR PPP: 0.9
LYMPHOCYTES # BLD: 1.2 K/UL (ref 0.5–4.6)
LYMPHOCYTES NFR BLD: 22 % (ref 13–44)
MAGNESIUM SERPL-MCNC: 2.1 MG/DL (ref 1.8–2.4)
MCH RBC QN AUTO: 30.1 PG (ref 26.1–32.9)
MCHC RBC AUTO-ENTMCNC: 33.1 G/DL (ref 31.4–35)
MCV RBC AUTO: 90.7 FL (ref 79.6–97.8)
MONOCYTES # BLD: 0.5 K/UL (ref 0.1–1.3)
MONOCYTES NFR BLD: 9 % (ref 4–12)
NEUTS SEG # BLD: 3.4 K/UL (ref 1.7–8.2)
NEUTS SEG NFR BLD: 61 % (ref 43–78)
NRBC # BLD: 0 K/UL (ref 0–0.2)
P-R INTERVAL, ECG05: 144 MS
PLATELET # BLD AUTO: 289 K/UL (ref 150–450)
PMV BLD AUTO: 9.9 FL (ref 9.4–12.3)
POTASSIUM SERPL-SCNC: 3.3 MMOL/L (ref 3.5–5.1)
PROT SERPL-MCNC: 6.1 G/DL (ref 6.3–8.2)
PROTHROMBIN TIME: 11.7 SEC (ref 11.7–14.5)
Q-T INTERVAL, ECG07: 352 MS
QRS DURATION, ECG06: 82 MS
QTC CALCULATION (BEZET), ECG08: 423 MS
RBC # BLD AUTO: 3.66 M/UL (ref 4.05–5.2)
SODIUM SERPL-SCNC: 144 MMOL/L (ref 136–145)
VENTRICULAR RATE, ECG03: 87 BPM
VIT B12 SERPL-MCNC: 155 PG/ML (ref 193–986)
WBC # BLD AUTO: 5.6 K/UL (ref 4.3–11.1)

## 2019-07-16 PROCEDURE — 82550 ASSAY OF CK (CPK): CPT

## 2019-07-16 PROCEDURE — 93971 EXTREMITY STUDY: CPT

## 2019-07-16 PROCEDURE — 83735 ASSAY OF MAGNESIUM: CPT

## 2019-07-16 PROCEDURE — 99284 EMERGENCY DEPT VISIT MOD MDM: CPT | Performed by: EMERGENCY MEDICINE

## 2019-07-16 PROCEDURE — 85610 PROTHROMBIN TIME: CPT

## 2019-07-16 PROCEDURE — 70450 CT HEAD/BRAIN W/O DYE: CPT

## 2019-07-16 PROCEDURE — 74011000250 HC RX REV CODE- 250: Performed by: EMERGENCY MEDICINE

## 2019-07-16 PROCEDURE — 74011250636 HC RX REV CODE- 250/636: Performed by: EMERGENCY MEDICINE

## 2019-07-16 PROCEDURE — 85025 COMPLETE CBC W/AUTO DIFF WBC: CPT

## 2019-07-16 PROCEDURE — 80053 COMPREHEN METABOLIC PANEL: CPT

## 2019-07-16 PROCEDURE — 93005 ELECTROCARDIOGRAM TRACING: CPT | Performed by: EMERGENCY MEDICINE

## 2019-07-16 PROCEDURE — 82607 VITAMIN B-12: CPT

## 2019-07-16 PROCEDURE — 96372 THER/PROPH/DIAG INJ SC/IM: CPT | Performed by: EMERGENCY MEDICINE

## 2019-07-16 PROCEDURE — 83880 ASSAY OF NATRIURETIC PEPTIDE: CPT

## 2019-07-16 RX ORDER — CYANOCOBALAMIN 1000 UG/ML
1000 INJECTION, SOLUTION INTRAMUSCULAR; SUBCUTANEOUS ONCE
Status: COMPLETED | OUTPATIENT
Start: 2019-07-16 | End: 2019-07-16

## 2019-07-16 RX ORDER — SODIUM CHLORIDE 0.9 % (FLUSH) 0.9 %
5-40 SYRINGE (ML) INJECTION AS NEEDED
Status: DISCONTINUED | OUTPATIENT
Start: 2019-07-16 | End: 2019-07-16 | Stop reason: HOSPADM

## 2019-07-16 RX ORDER — FOLIC ACID 5 MG/ML
1 INJECTION, SOLUTION INTRAMUSCULAR; INTRAVENOUS; SUBCUTANEOUS
Status: COMPLETED | OUTPATIENT
Start: 2019-07-16 | End: 2019-07-16

## 2019-07-16 RX ORDER — SODIUM CHLORIDE 0.9 % (FLUSH) 0.9 %
5-40 SYRINGE (ML) INJECTION EVERY 8 HOURS
Status: DISCONTINUED | OUTPATIENT
Start: 2019-07-16 | End: 2019-07-16 | Stop reason: HOSPADM

## 2019-07-16 RX ADMIN — FOLIC ACID 1 MG: 5 INJECTION, SOLUTION INTRAMUSCULAR; INTRAVENOUS; SUBCUTANEOUS at 20:14

## 2019-07-16 RX ADMIN — CYANOCOBALAMIN 1000 MCG: 1000 INJECTION, SOLUTION INTRAMUSCULAR at 20:08

## 2019-07-16 NOTE — DISCHARGE INSTRUCTIONS
Patient Education        Numbness and Tingling: Care Instructions  Your Care Instructions    Many things can cause numbness or tingling. Swelling may put pressure on a nerve. This could cause you to lose feeling or have a pins-and-needles sensation on part of your body. Nerves may be damaged from trauma, toxins, or diseases, such as diabetes or multiple sclerosis (MS). Sometimes, though, the cause is not clear. If there is no clear reason for your symptoms, and you are not having any other symptoms, your doctor may suggest watching and waiting for a while to see if the numbness or tingling goes away on its own. Your doctor may want you to have blood or nerve tests to find the cause of your symptoms. Follow-up care is a key part of your treatment and safety. Be sure to make and go to all appointments, and call your doctor if you are having problems. It's also a good idea to know your test results and keep a list of the medicines you take. How can you care for yourself at home? · If your doctor prescribes medicine, take it exactly as directed. Call your doctor if you think you are having a problem with your medicine. · If you have any swelling, put ice or a cold pack on the area for 10 to 20 minutes at a time. Put a thin cloth between the ice and your skin. When should you call for help? Call 911 anytime you think you may need emergency care. For example, call if:    · You have weakness, numbness, or tingling in both legs.     · You lose bowel or bladder control.     · You have symptoms of a stroke. These may include:  ? Sudden numbness, tingling, weakness, or loss of movement in your face, arm, or leg, especially on only one side of your body. ? Sudden vision changes. ? Sudden trouble speaking. ? Sudden confusion or trouble understanding simple statements. ? Sudden problems with walking or balance.   ? A sudden, severe headache that is different from past headaches.    Watch closely for changes in your health, and be sure to contact your doctor if you have any problems, or if:    · You do not get better as expected. Where can you learn more? Go to http://sanchez-mila.info/. Enter P114 in the search box to learn more about \"Numbness and Tingling: Care Instructions. \"  Current as of: Gabby 3, 2018  Content Version: 11.9  © 8807-7728 [employer]. Care instructions adapted under license by [employer] (which disclaims liability or warranty for this information). If you have questions about a medical condition or this instruction, always ask your healthcare professional. [place] [address] any warranty or liability for your use of this information. Patient Education        Leg and Ankle Edema: Care Instructions  Your Care Instructions  Swelling in the legs, ankles, and feet is called edema. It is common after you sit or stand for a while. Long plane flights or car rides often cause swelling in the legs and feet. You may also have swelling if you have to stand for long periods of time at your job. Problems with the veins in the legs (varicose veins) and changes in hormones can also cause swelling. Sometimes the swelling in the ankles and feet is caused by a more serious problem, such as heart failure, infection, blood clots, or liver or kidney disease. Follow-up care is a key part of your treatment and safety. Be sure to make and go to all appointments, and call your doctor if you are having problems. It's also a good idea to know your test results and keep a list of the medicines you take. How can you care for yourself at home? · If your doctor gave you medicine, take it as prescribed. Call your doctor if you think you are having a problem with your medicine. · Whenever you are resting, raise your legs up. Try to keep the swollen area higher than the level of your heart. · Take breaks from standing or sitting in one position.   ? Walk around to increase the blood flow in your lower legs. ? Move your feet and ankles often while you stand, or tighten and relax your leg muscles. · Wear support stockings. Put them on in the morning, before swelling gets worse. · Eat a balanced diet. Lose weight if you need to. · Limit the amount of salt (sodium) in your diet. Salt holds fluid in the body and may increase swelling. When should you call for help? Call 911 anytime you think you may need emergency care. For example, call if:    · You have symptoms of a blood clot in your lung (called a pulmonary embolism). These may include:  ? Sudden chest pain. ? Trouble breathing. ? Coughing up blood.    Call your doctor now or seek immediate medical care if:    · You have signs of a blood clot, such as:  ? Pain in your calf, back of the knee, thigh, or groin. ? Redness and swelling in your leg or groin.     · You have symptoms of infection, such as:  ? Increased pain, swelling, warmth, or redness. ? Red streaks or pus. ? A fever.    Watch closely for changes in your health, and be sure to contact your doctor if:    · Your swelling is getting worse.     · You have new or worsening pain in your legs.     · You do not get better as expected. Where can you learn more? Go to http://sanchez-mila.info/. Enter H701 in the search box to learn more about \"Leg and Ankle Edema: Care Instructions. \"  Current as of: September 23, 2018  Content Version: 11.9  © 8013-3523 [employer]. Care instructions adapted under license by [employer] (which disclaims liability or warranty for this information). If you have questions about a medical condition or this instruction, always ask your healthcare professional. [place] [address] any warranty or liability for your use of this information. Patient Education   Vitamin B Combination (By mouth)   Supplies your body with B vitamins.  You might need extra B vitamins because of an illness or other medicines that you are using. You might not be getting enough B vitamins from the food you eat, or for other reasons. Brand Name(s): Allbee w/C, Av-Lindy FB, Av-Lindy FB Forte, B Complex with C, B-Compleet, B6-Folic Acid, Balanced J47, Mercer Homocysteine Guard, Cerefolin, Deplin 15, Deplin 7.5, Eligen B12, FaBB, Folast, Folbee   There may be other brand names for this medicine. When This Medicine Should Not Be Used: You should not use this medicine if you have had an allergic reaction to any kind of vitamin B. How to Use This Medicine:   Tablet, Liquid, Long Acting Tablet, Capsule  · Your doctor will tell you how much medicine to use. Do not use more than directed. · Carefully follow your doctor's instructions about any special diet. · Follow the instructions on the medicine label if you are using this medicine without a prescription. · Swallow the extended-release tablet whole. Do not crush, break, or chew it. · The chewable tablet must be chewed completely before you swallow it. · Measure the oral liquid medicine with a marked measuring spoon, oral syringe, or medicine cup. You might need to shake the liquid just before you use it. If a dose is missed:   · Missing a dose of a vitamin supplement is usually not a cause for concern. · Take a dose as soon as you remember. If it is almost time for your next dose, wait until then and take a regular dose. Do not take extra medicine to make up for a missed dose. How to Store and Dispose of This Medicine:   · Store the medicine in a closed container at room temperature, away from heat, moisture, and direct light. · Ask your pharmacist, doctor, or health caregiver about the best way to dispose of any outdated medicine or medicine no longer needed. · Keep all medicine out of the reach of children. Never share your medicine with anyone.   Drugs and Foods to Avoid:      Ask your doctor or pharmacist before using any other medicine, including over-the-counter medicines, vitamins, and herbal products. Warnings While Using This Medicine:   · Make sure your doctor knows if you are pregnant or breast feeding. Tell your doctor if you have liver disease or kidney disease. Possible Side Effects While Using This Medicine:   Call your doctor right away if you notice any of these side effects:  · Allergic reaction: Itching or hives, swelling in your face or hands, swelling or tingling in your mouth or throat, chest tightness, trouble breathing  If you notice these less serious side effects, talk with your doctor:   · Nausea, diarrhea, gas. If you notice other side effects that you think are caused by this medicine, tell your doctor. Call your doctor for medical advice about side effects. You may report side effects to FDA at 7-099-FDA-1192  © 2017 [facility] Information is for End User's use only and may not be sold, redistributed or otherwise used for commercial purposes. The above information is an  only. It is not intended as medical advice for individual conditions or treatments. Talk to your doctor, nurse or pharmacist before following any medical regimen to see if it is safe and effective for you.

## 2019-07-16 NOTE — CDMP QUERY
Patient admitted with PASQUALE , noted to have alcohol withdrawal documented . If possible, please document in progress notes and d/c summary if you are evaluating and/or treating any of the following: ? Alcohol Dependency ? Alcohol abuse 
? Other, please specify ? Unable to Determine The medical record reflects the following: 
 
  Risk Factors: 62 female, hx ETOH use Clinical Indicators: per documentation \" Patient drinks heavily and her last drink was 2 days ago\" \" patient seems to be in alcohol withdrawal\" restless,  
 
 
 
 
  Treatment: alcohol withdrawal protocol initiated, Banana Bag, Scheduled librium, PRN Tanika wall Adeline Rubenstein RN Compliant Documentation Management Program 
[phone]

## 2019-07-16 NOTE — ED TRIAGE NOTES
Swelling in legs and feet. States tingling in hands and feet. States has been seen for same last week. States the swelling is still present. States has ongoing numbness and tingling that has continued and not quit since she was here last. States all symptoms were there last week when she was here. Nothing has changed.

## 2019-07-16 NOTE — ED NOTES
Verbal report received from [address] for continuation of patient care upon shift change. Patient care transferred at this time.

## 2019-07-16 NOTE — ED PROVIDER NOTES
52-year-old  female presents to emergency room complaining of worsening left-sided numbness and weakness with associated slurring her speech and drooling out of the left side of her mouth. Patient was seen on 11 July for similar presentation, admitted overnight with negative MRI. States she was discharged home with some of the symptoms, but it progressively worsened since yesterday. She complains of bilateral lower extremity swelling and pain below the knees bilaterally but much worse than the left than the right. She denies any shortness of breath. She denies any alcohol use since discharge. She also complains of pain and numbness and tingling to left upper extremity especially along the elbow. Worse with attempts to ambulate. The history is provided by the patient. Leg Swelling    This is a new problem. The current episode started more than 1 week ago. The problem occurs daily. The problem has not changed since onset. Pain location: bilateral lower extremities below the knee worse on the left than the right. The quality of the pain is described as aching and intermittent. The pain is moderate. Associated symptoms include numbness, stiffness and tingling. Pertinent negatives include full range of motion, no itching, no back pain and no neck pain. The symptoms are aggravated by palpation, movement and standing. She has tried rest for the symptoms. The treatment provided no relief. There has been no history of extremity trauma.         Past Medical History:   Diagnosis Date    Alcohol abuse 10/31/2015    Alcohol withdrawal ([address] 10/31/2015    Asthma     Other ill-defined conditions(799.89)     lumbar and thoracic scoliosis    Other ill-defined conditions(799.89)     irregular heart rate    Psychiatric disorder     ADHD    Seizures ([address]        Past Surgical History:   Procedure Laterality Date    COLONOSCOPY N/A 1/2/2019    COLONOSCOPY  BMI 20/ROOM 607 performed by Jenna Osuna [facility] at [facility] ENDOSCOPY    HX GYN      hysterectomy    HX HEENT      sinus surgery. deviated septum.  HX ORTHOPAEDIC      right knee         History reviewed. No pertinent family history. Social History     Socioeconomic History    Marital status:      Spouse name: Not on file    Number of children: Not on file    Years of education: Not on file    Highest education level: Not on file   Occupational History    Not on file   Social Needs    Financial resource strain: Not on file    Food insecurity:     Worry: Not on file     Inability: Not on file    Transportation needs:     Medical: Not on file     Non-medical: Not on file   Tobacco Use    Smoking status: Current Every Day Smoker     Packs/day: 0.25    Smokeless tobacco: Never Used   Substance and Sexual Activity    Alcohol use: No     Alcohol/week: 0.0 oz    Drug use: No    Sexual activity: Not on file   Lifestyle    Physical activity:     Days per week: Not on file     Minutes per session: Not on file    Stress: Not on file   Relationships    Social connections:     Talks on phone: Not on file     Gets together: Not on file     Attends Christianity service: Not on file     Active member of club or organization: Not on file     Attends meetings of clubs or organizations: Not on file     Relationship status: Not on file    Intimate partner violence:     Fear of current or ex partner: Not on file     Emotionally abused: Not on file     Physically abused: Not on file     Forced sexual activity: Not on file   Other Topics Concern    Not on file   Social History Narrative    Not on file         ALLERGIES: Patient has no known allergies. Review of Systems   Constitutional: Negative for chills and fever. Respiratory: Negative for cough and shortness of breath. Cardiovascular: Positive for leg swelling. Negative for chest pain and palpitations. Gastrointestinal: Negative for abdominal pain, nausea and vomiting.    Musculoskeletal: Positive for stiffness. Negative for back pain and neck pain. Skin: Negative for itching. Neurological: Positive for tingling, tremors, facial asymmetry, speech difficulty, weakness and numbness. Negative for light-headedness and headaches. All other systems reviewed and are negative. Vitals:    07/16/19 1426   BP: 129/85   Pulse: (!) 107   Resp: 16   Temp: 99 °F (37.2 °C)   SpO2: 97%   Weight: 51.7 kg (114 lb)   Height: 5' 5\" (1.651 m)            Physical Exam   Constitutional: She is oriented to person, place, and time. She appears well-developed and well-nourished. No distress. HENT:   Head: Normocephalic and atraumatic. Right Ear: External ear normal.   Left Ear: External ear normal.   Mouth/Throat: Oropharynx is clear and moist.   Eyes: Pupils are equal, round, and reactive to light. Conjunctivae and EOM are normal.   Neck: Normal range of motion. Neck supple. No thyromegaly present. Cardiovascular: Normal rate, regular rhythm, normal heart sounds and intact distal pulses. Pulmonary/Chest: Effort normal and breath sounds normal.   Abdominal: Soft. Bowel sounds are normal. There is no tenderness. No hernia. Musculoskeletal: Normal range of motion. She exhibits edema (2-3+ left lower extremity 2+ right lower extremity,bilateral extremities are tender below the knees.) and tenderness. Neurological: She is alert and oriented to person, place, and time. She displays normal reflexes. A sensory deficit (altered left side, subjective) is present. No cranial nerve deficit (question of slightest left facial.). Coordination normal.   RUE 5/5  LUE 4/5  RLE 4/5  LLE 3+/5  Strength seems to vary with distraction     Skin: Skin is warm and dry. Capillary refill takes less than 2 seconds. Psychiatric: She has a normal mood and affect. Her speech is normal and behavior is normal. Thought content normal. Cognition and memory are normal.   Nursing note and vitals reviewed.        MDM  Number of Diagnoses or Management Options  B12 deficiency: new and requires workup  Dependent edema: new and requires workup  Numbness and tingling: new and requires workup     Amount and/or Complexity of Data Reviewed  Clinical lab tests: ordered and reviewed  Tests in the radiology section of CPT®: ordered and reviewed  Review and summarize past medical records: yes (   Admit Date:     2019 10:20 AM   Name:              Rivera Urbina   Age:                 62 y.o.  :               1955   MRN:               [id number]   PCP:                None  Treatment Team: Attending Provider: Kasandra Snyder MD; Care Manager: Zonia Franklin; Occupational Therapist: Rosemarie Simons, OT; Physical Therapist: Dom Marquez, PT, DPT; Utilization Review: Rosy Rogel     Problem List for this Hospitalization:  Hospital Problems as of 2019 Date Reviewed: 2019          Codes Class Noted - Resolved POA    [facility] ICD-10-CM: N17.9  ICD-9-CM: 011. 9   2019 - Present Unknown                   Admission HPI from 2019:    62year old female seen as a code S with left sided numbness. The patient initially presented with generalized body aches and weakness after binge drinking for several days. She then reported left sided numbness. The onset of these symptoms was unclear and a definitive last known normal was unable to be established. CT of the head was obtained and was negative for acute abnormality . CTA of head neck was not obtained due to low NIHSS. MRI of brain negative for acute intracranial abnormality. TTE negative for PFO or atrial enlargement. Stroke work up negative. No further neurological work up required.     [facility] Course:  Patient alert and oriented x3. Neuro work up benign. Patient states needing to leave to get a deposit back and needs to leave in 2 hours. Renal function improved. No N/V/D. Received Banana Bag in ED yesterday and oral vitamins.  Only slight tremors noted this afternoon. Patient states having a place to go. Will have case management provide information for outpatient ETOH assistance.      Follow up instructions and discharge meds at bottom of this note. Plan was discussed with patient. All questions answered. Patient was stable at time of discharge.      )  Independent visualization of images, tracings, or specimens: yes    Risk of Complications, Morbidity, and/or Mortality  Presenting problems: high  Diagnostic procedures: moderate  Management options: moderate    Patient Progress  Patient progress: improved         Procedures    The patient was observed in the ED. Patient ambulated without difficulty in the emergency department. I suspect her paresthesias to the bilateral lower extremities though low worse on the left than the right are more related to her B12 levels. Results Reviewed:      Recent Results (from the past 24 hour(s))   CBC WITH AUTOMATED DIFF    Collection Time: 07/16/19  4:05 PM   Result Value Ref Range    WBC 5.6 4.3 - 11.1 K/uL    RBC 3.66 (L) 4.05 - 5.2 M/uL    HGB 11.0 (L) 11.7 - 15.4 g/dL    HCT 33.2 (L) 35.8 - 46.3 %    MCV 90.7 79.6 - 97.8 FL    MCH 30.1 26.1 - 32.9 PG    MCHC 33.1 31.4 - 35.0 g/dL    RDW 13.3 11.9 - 14.6 %    PLATELET 264 354 - 133 K/uL    MPV 9.9 9.4 - 12.3 FL    ABSOLUTE NRBC 0.00 0.0 - 0.2 K/uL    DF AUTOMATED      NEUTROPHILS 61 43 - 78 %    LYMPHOCYTES 22 13 - 44 %    MONOCYTES 9 4.0 - 12.0 %    EOSINOPHILS 7 0.5 - 7.8 %    BASOPHILS 0 0.0 - 2.0 %    IMMATURE GRANULOCYTES 0 0.0 - 5.0 %    ABS. NEUTROPHILS 3.4 1.7 - 8.2 K/UL    ABS. LYMPHOCYTES 1.2 0.5 - 4.6 K/UL    ABS. MONOCYTES 0.5 0.1 - 1.3 K/UL    ABS. EOSINOPHILS 0.4 0.0 - 0.8 K/UL    ABS. BASOPHILS 0.0 0.0 - 0.2 K/UL    ABS. IMM.  GRANS. 0.0 0.0 - 0.5 K/UL   METABOLIC PANEL, COMPREHENSIVE    Collection Time: 07/16/19  4:05 PM   Result Value Ref Range    Sodium 144 136 - 145 mmol/L    Potassium 3.3 (L) 3.5 - 5.1 mmol/L    Chloride 109 (H) 98 - 107 mmol/L    CO2 30 21 - 32 mmol/L    Anion gap 5 (L) 7 - 16 mmol/L    Glucose 102 (H) 65 - 100 mg/dL    BUN 12 8 - 23 MG/DL    Creatinine 0.46 (L) 0.6 - 1.0 MG/DL    GFR est AA >60 >60 ml/min/1.73m2    GFR est non-AA >60 >60 ml/min/1.73m2    Calcium 8.5 8.3 - 10.4 MG/DL    Bilirubin, total 0.1 (L) 0.2 - 1.1 MG/DL    ALT (SGPT) 15 12 - 65 U/L    AST (SGOT) 29 15 - 37 U/L    Alk. phosphatase 136 50 - 136 U/L    Protein, total 6.1 (L) 6.3 - 8.2 g/dL    Albumin 2.9 (L) 3.2 - 4.6 g/dL    Globulin 3.2 2.3 - 3.5 g/dL    A-G Ratio 0.9 (L) 1.2 - 3.5     BNP    Collection Time: 07/16/19  4:05 PM   Result Value Ref Range    BNP 8 (H) 0 pg/mL   MAGNESIUM    Collection Time: 07/16/19  4:05 PM   Result Value Ref Range    Magnesium 2.1 1.8 - 2.4 mg/dL   PROTHROMBIN TIME + INR    Collection Time: 07/16/19  4:05 PM   Result Value Ref Range    Prothrombin time 11.7 11.7 - 14.5 sec    INR 0.9     CK    Collection Time: 07/16/19  4:05 PM   Result Value Ref Range     21 - 215 U/L   VITAMIN B12    Collection Time: 07/16/19  4:05 PM   Result Value Ref Range    Vitamin B12 155 (L) 193 - 986 pg/mL   EKG, 12 LEAD, INITIAL    Collection Time: 07/16/19  4:11 PM   Result Value Ref Range    Ventricular Rate 87 BPM    Atrial Rate 87 BPM    P-R Interval 144 ms    QRS Duration 82 ms    Q-T Interval 352 ms    QTC Calculation (Bezet) 423 ms    Calculated P Axis 81 degrees    Calculated R Axis 81 degrees    Calculated T Axis 83 degrees    Diagnosis       !! AGE AND GENDER SPECIFIC ECG ANALYSIS !! Normal sinus rhythm  Normal ECG  When compared with ECG of 11-JUL-2019 15:08,  Nonspecific T wave abnormality no longer evident in Inferior leads  Confirmed by ST ZEINAB MEDINA MD (), TONIE PALACIO ([id number]) on 7/16/2019 8:37:07 PM       CT HEAD WO CONT   Final Result   IMPRESSION:  Negative for acute intracranial abnormality. DUPLEX LOWER EXT VENOUS LEFT   Final Result   IMPRESSION: Negative for sonographic evidence of deep venous thrombosis left   lower extremity. I discussed the results of all labs, procedures, radiographs, and treatments with the patient and available family. Treatment plan is agreed upon and the patient is ready for discharge. All voiced understanding of the discharge plan and medication instructions or changes as appropriate. Questions about treatment in the ED were answered. All were encouraged to return should symptoms worsen or new problems develop.

## 2019-07-16 NOTE — ED TRIAGE NOTES
Reports was seen recently last week. Reports swelling to both feet. States left foot is \"like a dead wood\" but can feel pain in her leg and foot.

## 2019-07-17 NOTE — ED NOTES
I have reviewed discharge instructions with the patient. The patient verbalized understanding. Patient left ED via Discharge Method: ambulatory to Home with self in provided cab. Opportunity for questions and clarification provided. Patient given 0 scripts. To continue your aftercare when you leave the hospital, you may receive an automated call from our care team to check in on how you are doing. This is a free service and part of our promise to provide the best care and service to meet your aftercare needs.  If you have questions, or wish to unsubscribe from this service please call [phone]. Thank you for Choosing our [facility] Emergency Department.

## 2019-07-17 NOTE — ED NOTES
Pt awaiting shot time for discharge. Pt to be discharged after 2044. Pt requesting a ride home, Rn to notify charge nurse, Clyde Sims.

## 2019-07-24 ENCOUNTER — HOSPITAL ENCOUNTER (EMERGENCY)
Age: 64
Discharge: HOME | End: 2019-07-24
Attending: EMERGENCY MEDICINE
Payer: MEDICARE

## 2019-07-24 ENCOUNTER — APPOINTMENT (OUTPATIENT)
Dept: CT IMAGING | Age: 64
End: 2019-07-24
Attending: EMERGENCY MEDICINE
Payer: MEDICARE

## 2019-07-24 VITALS
WEIGHT: 114 LBS | OXYGEN SATURATION: 100 % | TEMPERATURE: 98.5 F | DIASTOLIC BLOOD PRESSURE: 89 MMHG | RESPIRATION RATE: 20 BRPM | HEIGHT: 65 IN | HEART RATE: 83 BPM | SYSTOLIC BLOOD PRESSURE: 137 MMHG | BODY MASS INDEX: 18.99 KG/M2

## 2019-07-24 DIAGNOSIS — R20.0 NUMBNESS: Primary | ICD-10-CM

## 2019-07-24 LAB
ALBUMIN SERPL-MCNC: 3.6 G/DL (ref 3.2–4.6)
ALBUMIN/GLOB SERPL: 1 {RATIO} (ref 1.2–3.5)
ALP SERPL-CCNC: 164 U/L (ref 50–136)
ALT SERPL-CCNC: 12 U/L (ref 12–65)
ANION GAP SERPL CALC-SCNC: 7 MMOL/L (ref 7–16)
AST SERPL-CCNC: 13 U/L (ref 15–37)
ATRIAL RATE: 144 BPM
BASOPHILS # BLD: 0.1 K/UL (ref 0–0.2)
BASOPHILS NFR BLD: 1 % (ref 0–2)
BILIRUB SERPL-MCNC: 0.1 MG/DL (ref 0.2–1.1)
BUN SERPL-MCNC: 13 MG/DL (ref 8–23)
CALCIUM SERPL-MCNC: 8.8 MG/DL (ref 8.3–10.4)
CALCULATED P AXIS, ECG09: 78 DEGREES
CALCULATED R AXIS, ECG10: 77 DEGREES
CALCULATED T AXIS, ECG11: 76 DEGREES
CHLORIDE SERPL-SCNC: 105 MMOL/L (ref 98–107)
CO2 SERPL-SCNC: 30 MMOL/L (ref 21–32)
CREAT SERPL-MCNC: 0.6 MG/DL (ref 0.6–1)
DIAGNOSIS, 93000: NORMAL
DIFFERENTIAL METHOD BLD: ABNORMAL
EOSINOPHIL # BLD: 0.1 K/UL (ref 0–0.8)
EOSINOPHIL NFR BLD: 1 % (ref 0.5–7.8)
ERYTHROCYTE [DISTWIDTH] IN BLOOD BY AUTOMATED COUNT: 15 % (ref 11.9–14.6)
GLOBULIN SER CALC-MCNC: 3.6 G/DL (ref 2.3–3.5)
GLUCOSE SERPL-MCNC: 88 MG/DL (ref 65–100)
HCT VFR BLD AUTO: 42.5 % (ref 35.8–46.3)
HGB BLD-MCNC: 13.3 G/DL (ref 11.7–15.4)
IMM GRANULOCYTES # BLD AUTO: 0 K/UL (ref 0–0.5)
IMM GRANULOCYTES NFR BLD AUTO: 0 % (ref 0–5)
LYMPHOCYTES # BLD: 2.3 K/UL (ref 0.5–4.6)
LYMPHOCYTES NFR BLD: 30 % (ref 13–44)
MCH RBC QN AUTO: 29.6 PG (ref 26.1–32.9)
MCHC RBC AUTO-ENTMCNC: 31.3 G/DL (ref 31.4–35)
MCV RBC AUTO: 94.7 FL (ref 79.6–97.8)
MONOCYTES # BLD: 0.7 K/UL (ref 0.1–1.3)
MONOCYTES NFR BLD: 9 % (ref 4–12)
NEUTS SEG # BLD: 4.5 K/UL (ref 1.7–8.2)
NEUTS SEG NFR BLD: 58 % (ref 43–78)
NRBC # BLD: 0 K/UL (ref 0–0.2)
P-R INTERVAL, ECG05: 140 MS
PLATELET # BLD AUTO: 515 K/UL (ref 150–450)
PMV BLD AUTO: 9.3 FL (ref 9.4–12.3)
POTASSIUM SERPL-SCNC: 4 MMOL/L (ref 3.5–5.1)
PROT SERPL-MCNC: 7.2 G/DL (ref 6.3–8.2)
Q-T INTERVAL, ECG07: 352 MS
QRS DURATION, ECG06: 80 MS
QTC CALCULATION (BEZET), ECG08: 411 MS
RBC # BLD AUTO: 4.49 M/UL (ref 4.05–5.2)
SODIUM SERPL-SCNC: 142 MMOL/L (ref 136–145)
VENTRICULAR RATE, ECG03: 82 BPM
WBC # BLD AUTO: 7.7 K/UL (ref 4.3–11.1)

## 2019-07-24 PROCEDURE — 85025 COMPLETE CBC W/AUTO DIFF WBC: CPT

## 2019-07-24 PROCEDURE — 99285 EMERGENCY DEPT VISIT HI MDM: CPT | Performed by: EMERGENCY MEDICINE

## 2019-07-24 PROCEDURE — 70450 CT HEAD/BRAIN W/O DYE: CPT

## 2019-07-24 PROCEDURE — 80053 COMPREHEN METABOLIC PANEL: CPT

## 2019-07-24 PROCEDURE — 93005 ELECTROCARDIOGRAM TRACING: CPT | Performed by: EMERGENCY MEDICINE

## 2019-07-24 NOTE — ED NOTES
I have reviewed discharge instructions with the patient. The patient verbalized understanding. Patient left ED via Discharge Method: ambulatory via taxi. Opportunity for questions and clarification provided. Patient given 0 scripts. To continue your aftercare when you leave the hospital, you may receive an automated call from our care team to check in on how you are doing. This is a free service and part of our promise to provide the best care and service to meet your aftercare needs.  If you have questions, or wish to unsubscribe from this service please call [phone]. Thank you for Choosing our [facility] Emergency Department.

## 2019-07-24 NOTE — ED PROVIDER NOTES
Patient is a 78-year-old female who comes to the emergency department today complaining of numbness. Patient has had multiple recent visits to our hospital in [facility] with similar complaints. She was admitted twice a few weeks ago for stroke workup which was negative. She states she has had some chronic numbness in the left leg since then. Today she was standing in the kitchen at the shelter where she is currently staying and she started having some numbness in her right lower leg and then her right arm. She states it felt similar to the numbness she experienced recently on the left side. She keeps changing her story while IN the nurses were in the room talking to her. She then complains of a headache. The history is provided by the patient. Numbness   This is a new problem. The current episode started 1 to 2 hours ago. The problem has not changed since onset. There was right upper extremity and right lower extremity focality noted. Primary symptoms include loss of sensation. Pertinent negatives include no focal weakness, no loss of balance, no slurred speech, no speech difficulty and no mental status change. There has been no fever. Associated symptoms include headaches. Pertinent negatives include no shortness of breath, no chest pain, no vomiting, no altered mental status and no nausea. There were no medications administered prior to arrival. Associated medical issues do not include trauma, seizures or CVA.         Past Medical History:   Diagnosis Date    Alcohol abuse 10/31/2015    Alcohol withdrawal ([address] 10/31/2015    Asthma     Other ill-defined conditions(799.89)     lumbar and thoracic scoliosis    Other ill-defined conditions(799.89)     irregular heart rate    Psychiatric disorder     ADHD    Seizures ([address]        Past Surgical History:   Procedure Laterality Date    COLONOSCOPY N/A 1/2/2019    COLONOSCOPY  BMI 20/ROOM 607 performed by Saumya Araiza MD at [address] GYN      hysterectomy    HX HEENT      sinus surgery. deviated septum.  HX ORTHOPAEDIC      right knee         History reviewed. No pertinent family history. Social History     Socioeconomic History    Marital status:      Spouse name: Not on file    Number of children: Not on file    Years of education: Not on file    Highest education level: Not on file   Occupational History    Not on file   Social Needs    Financial resource strain: Not on file    Food insecurity:     Worry: Not on file     Inability: Not on file    Transportation needs:     Medical: Not on file     Non-medical: Not on file   Tobacco Use    Smoking status: Current Every Day Smoker     Packs/day: 0.25    Smokeless tobacco: Never Used   Substance and Sexual Activity    Alcohol use: No     Alcohol/week: 0.0 standard drinks    Drug use: No    Sexual activity: Not on file   Lifestyle    Physical activity:     Days per week: Not on file     Minutes per session: Not on file    Stress: Not on file   Relationships    Social connections:     Talks on phone: Not on file     Gets together: Not on file     Attends Voodoo service: Not on file     Active member of club or organization: Not on file     Attends meetings of clubs or organizations: Not on file     Relationship status: Not on file    Intimate partner violence:     Fear of current or ex partner: Not on file     Emotionally abused: Not on file     Physically abused: Not on file     Forced sexual activity: Not on file   Other Topics Concern    Not on file   Social History Narrative    Not on file         ALLERGIES: Patient has no known allergies. Review of Systems   Constitutional: Negative for chills, fatigue and fever. HENT: Negative for congestion, rhinorrhea and sore throat. Eyes: Negative for pain, discharge and visual disturbance. Respiratory: Negative for cough and shortness of breath. Cardiovascular: Negative for chest pain and palpitations. Gastrointestinal: Negative for abdominal pain, diarrhea, nausea and vomiting. Endocrine: Negative for polydipsia and polyuria. Genitourinary: Negative for dysuria, frequency and urgency. Musculoskeletal: Negative for back pain and neck pain. Skin: Negative for rash. Neurological: Positive for numbness and headaches. Negative for focal weakness, seizures, syncope, speech difficulty, weakness and loss of balance. Hematological: Negative. Vitals:    07/24/19 1412   BP: 152/90   Pulse: 81   Resp: 16   Temp: 98.5 °F (36.9 °C)   SpO2: 98%   Weight: 51.7 kg (114 lb)   Height: 5' 5\" (1.651 m)            Physical Exam   Constitutional: She is oriented to person, place, and time. She appears well-developed and well-nourished. HENT:   Head: Normocephalic and atraumatic. Eyes: Pupils are equal, round, and reactive to light. Conjunctivae and EOM are normal.   Neck: Normal range of motion. Neck supple. Cardiovascular: Normal rate, regular rhythm and intact distal pulses. Pulmonary/Chest: Effort normal and breath sounds normal.   Abdominal: Soft. There is no tenderness. There is no rebound and no guarding. Musculoskeletal: Normal range of motion. She exhibits no edema or deformity. Lymphadenopathy:     She has no cervical adenopathy. Neurological: She is alert and oriented to person, place, and time. She has normal strength and normal reflexes. She displays no atrophy and no tremor. A sensory deficit is present. No cranial nerve deficit. She displays no seizure activity. Coordination and gait normal. GCS eye subscore is 4. GCS verbal subscore is 5. GCS motor subscore is 6. Subjective numbness to the right lower leg. Skin: Skin is warm and dry. No rash noted. Psychiatric:   Very bizarre affect and repeatedly changing her story. Nursing note and vitals reviewed.        MDM  Number of Diagnoses or Management Options  Diagnosis management comments: EKG reviewed by me shows normal sinus rhythm at a rate of 82    Prior records were reviewed and she has had several recent admissions and other ER visits with negative workups. Recent MRI scan was negative for stroke. Currently she has no weakness or obvious focal deficits and her story keeps changing no sign for code S or stroke alert at this time. 3:27 PM  Blood work is unremarkable  CAT scan of the head is negative    On repeat examination patient is sleeping comfortably easily arousable and neurologic exam is normal.    Voice dictation software was used during the making of this note. This software is not perfect and grammatical and other typographical errors may be present. This note has been proofread, but may still contain errors.   Brynn Navarrete MD; 7/24/2019 @3:28 PM   ===================================================================         Amount and/or Complexity of Data Reviewed  Clinical lab tests: ordered and reviewed  Tests in the radiology section of CPT®: ordered and reviewed  Review and summarize past medical records: yes  Independent visualization of images, tracings, or specimens: yes    Risk of Complications, Morbidity, and/or Mortality  Presenting problems: low  Diagnostic procedures: low  Management options: low    Patient Progress  Patient progress: stable         Procedures

## 2019-07-24 NOTE — ED TRIAGE NOTES
Patient arrives via [facility] from [facility]. Patient reports right sided weakness, reports unable to move lower extremities. Patient reports numbness on left side, but pain is present. Patient reports bilateral numbness to arms bilaterally. Patient reports slurred speech present. Patient reports symptoms intermittent for several weeks. Patient reports unable to life lower extremities off of stretcher but is able to transfer from wheelchair to stretcher.

## 2019-07-24 NOTE — DISCHARGE INSTRUCTIONS
Follow up with a primary care physician. Return to the emergency department for any other acute concerns.

## 2019-08-09 ENCOUNTER — HOSPITAL ENCOUNTER (EMERGENCY)
Age: 64
Discharge: HOME | End: 2019-08-09
Attending: EMERGENCY MEDICINE
Payer: MEDICARE

## 2019-08-09 ENCOUNTER — APPOINTMENT (OUTPATIENT)
Dept: CT IMAGING | Age: 64
End: 2019-08-09
Attending: EMERGENCY MEDICINE
Payer: MEDICARE

## 2019-08-09 VITALS
WEIGHT: 120 LBS | BODY MASS INDEX: 19.99 KG/M2 | TEMPERATURE: 98.1 F | SYSTOLIC BLOOD PRESSURE: 119 MMHG | OXYGEN SATURATION: 98 % | HEIGHT: 65 IN | RESPIRATION RATE: 16 BRPM | HEART RATE: 88 BPM | DIASTOLIC BLOOD PRESSURE: 81 MMHG

## 2019-08-09 DIAGNOSIS — R20.0 NUMBNESS: Primary | ICD-10-CM

## 2019-08-09 LAB
ALBUMIN SERPL-MCNC: 3.5 G/DL (ref 3.2–4.6)
ALBUMIN/GLOB SERPL: 1.1 {RATIO} (ref 1.2–3.5)
ALP SERPL-CCNC: 127 U/L (ref 50–136)
ALT SERPL-CCNC: 13 U/L (ref 12–65)
ANION GAP SERPL CALC-SCNC: 4 MMOL/L (ref 7–16)
AST SERPL-CCNC: 16 U/L (ref 15–37)
ATRIAL RATE: 88 BPM
BASOPHILS # BLD: 0 K/UL (ref 0–0.2)
BASOPHILS NFR BLD: 1 % (ref 0–2)
BILIRUB SERPL-MCNC: 0.3 MG/DL (ref 0.2–1.1)
BUN SERPL-MCNC: 10 MG/DL (ref 8–23)
CALCIUM SERPL-MCNC: 8.9 MG/DL (ref 8.3–10.4)
CALCULATED P AXIS, ECG09: 76 DEGREES
CALCULATED R AXIS, ECG10: 74 DEGREES
CALCULATED T AXIS, ECG11: 75 DEGREES
CHLORIDE SERPL-SCNC: 106 MMOL/L (ref 98–107)
CO2 SERPL-SCNC: 29 MMOL/L (ref 21–32)
CREAT SERPL-MCNC: 0.6 MG/DL (ref 0.6–1)
DIAGNOSIS, 93000: NORMAL
DIFFERENTIAL METHOD BLD: ABNORMAL
EOSINOPHIL # BLD: 0.1 K/UL (ref 0–0.8)
EOSINOPHIL NFR BLD: 2 % (ref 0.5–7.8)
ERYTHROCYTE [DISTWIDTH] IN BLOOD BY AUTOMATED COUNT: 14.9 % (ref 11.9–14.6)
GLOBULIN SER CALC-MCNC: 3.3 G/DL (ref 2.3–3.5)
GLUCOSE SERPL-MCNC: 163 MG/DL (ref 65–100)
HCT VFR BLD AUTO: 44 % (ref 35.8–46.3)
HGB BLD-MCNC: 14.1 G/DL (ref 11.7–15.4)
IMM GRANULOCYTES # BLD AUTO: 0 K/UL (ref 0–0.5)
IMM GRANULOCYTES NFR BLD AUTO: 0 % (ref 0–5)
LYMPHOCYTES # BLD: 1.5 K/UL (ref 0.5–4.6)
LYMPHOCYTES NFR BLD: 28 % (ref 13–44)
MCH RBC QN AUTO: 30.1 PG (ref 26.1–32.9)
MCHC RBC AUTO-ENTMCNC: 32 G/DL (ref 31.4–35)
MCV RBC AUTO: 93.8 FL (ref 79.6–97.8)
MONOCYTES # BLD: 0.5 K/UL (ref 0.1–1.3)
MONOCYTES NFR BLD: 8 % (ref 4–12)
NEUTS SEG # BLD: 3.4 K/UL (ref 1.7–8.2)
NEUTS SEG NFR BLD: 61 % (ref 43–78)
NRBC # BLD: 0 K/UL (ref 0–0.2)
P-R INTERVAL, ECG05: 142 MS
PLATELET # BLD AUTO: 265 K/UL (ref 150–450)
PMV BLD AUTO: 9.9 FL (ref 9.4–12.3)
POTASSIUM SERPL-SCNC: 3.8 MMOL/L (ref 3.5–5.1)
PROT SERPL-MCNC: 6.8 G/DL (ref 6.3–8.2)
Q-T INTERVAL, ECG07: 356 MS
QRS DURATION, ECG06: 82 MS
QTC CALCULATION (BEZET), ECG08: 430 MS
RBC # BLD AUTO: 4.69 M/UL (ref 4.05–5.2)
SODIUM SERPL-SCNC: 139 MMOL/L (ref 136–145)
VENTRICULAR RATE, ECG03: 88 BPM
WBC # BLD AUTO: 5.6 K/UL (ref 4.3–11.1)

## 2019-08-09 PROCEDURE — 80053 COMPREHEN METABOLIC PANEL: CPT

## 2019-08-09 PROCEDURE — 70450 CT HEAD/BRAIN W/O DYE: CPT

## 2019-08-09 PROCEDURE — 85025 COMPLETE CBC W/AUTO DIFF WBC: CPT

## 2019-08-09 PROCEDURE — 99285 EMERGENCY DEPT VISIT HI MDM: CPT | Performed by: EMERGENCY MEDICINE

## 2019-08-09 PROCEDURE — 93005 ELECTROCARDIOGRAM TRACING: CPT | Performed by: EMERGENCY MEDICINE

## 2019-08-09 NOTE — ED PROVIDER NOTES
Is a 61-year-old female with a history of prior alcohol abuse who arrives in the emergency department today via EMS from the shelter where she is currently staying. She states she awoke from sleep with left-sided numbness. Patient has history of chronic numbness and trouble with her gait which she uses a cane to assist her ambulation. She was admitted on July 11 for this numbness and had a negative stroke work-up. She was also seen by me in the ER for the same symptoms again 2 weeks ago. She denies any headache or loss of consciousness. The history is provided by the patient. Numbness   This is a chronic problem. The current episode started more than 1 week ago. The problem has not changed since onset. There was no focality noted. Pertinent negatives include no unresponsiveness. There has been no fever. Pertinent negatives include no shortness of breath, no chest pain, no vomiting, no nausea, no bowel incontinence and no bladder incontinence. There were no medications administered prior to arrival. Associated medical issues do not include trauma, seizures or CVA. Past Medical History:   Diagnosis Date    Alcohol abuse 10/31/2015    Alcohol withdrawal ([address] 10/31/2015    Asthma     Other ill-defined conditions(799.89)     lumbar and thoracic scoliosis    Other ill-defined conditions(799.89)     irregular heart rate    Psychiatric disorder     ADHD    Seizures ([address]        Past Surgical History:   Procedure Laterality Date    COLONOSCOPY N/A 1/2/2019    COLONOSCOPY  BMI 20/ROOM 607 performed by Veronica Pink MD at [address] HX GYN      hysterectomy    HX HEENT      sinus surgery. deviated septum.  HX ORTHOPAEDIC      right knee         History reviewed. No pertinent family history.     Social History     Socioeconomic History    Marital status:      Spouse name: Not on file    Number of children: Not on file    Years of education: Not on file    Highest education level: Not on file   Occupational History    Not on file   Social Needs    Financial resource strain: Not on file    Food insecurity:     Worry: Not on file     Inability: Not on file    Transportation needs:     Medical: Not on file     Non-medical: Not on file   Tobacco Use    Smoking status: Current Every Day Smoker     Packs/day: 0.25    Smokeless tobacco: Never Used   Substance and Sexual Activity    Alcohol use: No     Alcohol/week: 0.0 standard drinks    Drug use: No    Sexual activity: Not on file   Lifestyle    Physical activity:     Days per week: Not on file     Minutes per session: Not on file    Stress: Not on file   Relationships    Social connections:     Talks on phone: Not on file     Gets together: Not on file     Attends Jain service: Not on file     Active member of club or organization: Not on file     Attends meetings of clubs or organizations: Not on file     Relationship status: Not on file    Intimate partner violence:     Fear of current or ex partner: Not on file     Emotionally abused: Not on file     Physically abused: Not on file     Forced sexual activity: Not on file   Other Topics Concern    Not on file   Social History Narrative    Not on file         ALLERGIES: Patient has no known allergies. Review of Systems   Constitutional: Negative for chills, fatigue and fever. HENT: Negative for congestion, rhinorrhea and sore throat. Eyes: Negative for pain, discharge and visual disturbance. Respiratory: Negative for cough and shortness of breath. Cardiovascular: Negative for chest pain and palpitations. Gastrointestinal: Negative for abdominal pain, bowel incontinence, diarrhea, nausea and vomiting. Endocrine: Negative for polydipsia and polyuria. Genitourinary: Negative for bladder incontinence, dysuria, frequency and urgency. Musculoskeletal: Negative for back pain and neck pain. Skin: Negative for rash. Neurological: Positive for numbness.  Negative for seizures, syncope and weakness. Hematological: Negative. Vitals:    08/09/19 0717   BP: (!) 135/91   Pulse: 87   Resp: 16   Temp: 98.1 °F (36.7 °C)   SpO2: 99%   Weight: 54.4 kg (120 lb)   Height: 5' 5\" (1.651 m)            Physical Exam   Constitutional: She is oriented to person, place, and time. She appears well-developed and well-nourished. HENT:   Head: Normocephalic and atraumatic. Eyes: Pupils are equal, round, and reactive to light. Conjunctivae and EOM are normal.   Neck: Normal range of motion. Neck supple. Cardiovascular: Normal rate, regular rhythm and intact distal pulses. Pulmonary/Chest: Effort normal and breath sounds normal.   Abdominal: Soft. There is no tenderness. There is no rebound and no guarding. Musculoskeletal: Normal range of motion. She exhibits no edema or deformity. Lymphadenopathy:     She has no cervical adenopathy. Neurological: She is alert and oriented to person, place, and time. She has normal strength and normal reflexes. A sensory deficit is present. No cranial nerve deficit. Coordination normal. GCS eye subscore is 4. GCS verbal subscore is 5. GCS motor subscore is 6. Subjective numbness to light touch to the left arm and the left leg. Skin: Skin is warm and dry. No rash noted. Psychiatric: Her mood appears anxious. Nursing note and vitals reviewed. MDM  Number of Diagnoses or Management Options  Diagnosis management comments: EKG reviewed by me shows normal sinus rhythm at a rate of 82 ischemic changes    Prior records were reviewed and she was admitted to the hospital on July 11 for the same thing MRI CT scan and CTA were all negative. 10:57 AM  CAT scan of the head is negative. Voice dictation software was used during the making of this note. This software is not perfect and grammatical and other typographical errors may be present. This note has been proofread, but may still contain errors.   Prince Thayer MD; 8/9/2019 @10:57 AM   ===================================================================         Amount and/or Complexity of Data Reviewed  Clinical lab tests: ordered and reviewed  Tests in the radiology section of CPT®: ordered and reviewed  Review and summarize past medical records: yes  Independent visualization of images, tracings, or specimens: yes    Risk of Complications, Morbidity, and/or Mortality  Presenting problems: moderate  Diagnostic procedures: moderate  Management options: moderate    Patient Progress  Patient progress: stable         Procedures

## 2019-08-09 NOTE — ED TRIAGE NOTES
Pt arrives via EMS with numbness. Pt states this is NOT different from the numbness she always experiences.  to room to evaluate.  Pt laying and resting with eyes closed

## (undated) DEVICE — FORCEPS BX L240CM JAW DIA2.8MM L CAP W/ NDL MIC MESH TOOTH

## (undated) DEVICE — CANNULA NSL ORAL AD FOR CAPNOFLEX CO2 O2 AIRLFE

## (undated) DEVICE — CONTAINER PREFIL FRMLN 40ML --

## (undated) DEVICE — BLOCK BITE AD 60FR W/ VELC STRP ADDRESSES MOST PT AND

## (undated) DEVICE — SYR 5ML 1/5 GRAD LL NSAF LF --

## (undated) DEVICE — NDL PRT INJ NSAF BLNT 18GX1.5 --

## (undated) DEVICE — SYR 3ML LL TIP 1/10ML GRAD --

## (undated) DEVICE — REM POLYHESIVE ADULT PATIENT RETURN ELECTRODE: Brand: VALLEYLAB

## (undated) DEVICE — KENDALL RADIOLUCENT FOAM MONITORING ELECTRODE RECTANGULAR SHAPE: Brand: KENDALL

## (undated) DEVICE — CONNECTOR TBNG OD5-7MM O2 END DISP

## (undated) DEVICE — Device: Brand: DISPOSABLE ELECTROSURGICAL SNARE